# Patient Record
Sex: MALE | Race: BLACK OR AFRICAN AMERICAN | Employment: OTHER | URBAN - METROPOLITAN AREA
[De-identification: names, ages, dates, MRNs, and addresses within clinical notes are randomized per-mention and may not be internally consistent; named-entity substitution may affect disease eponyms.]

---

## 2017-02-28 ENCOUNTER — HOSPITAL ENCOUNTER (OUTPATIENT)
Dept: LAB | Age: 82
Discharge: HOME | End: 2017-02-28
Payer: MEDICARE

## 2017-02-28 DIAGNOSIS — N40.0 BENIGN ENLARGEMENT OF PROSTATE: ICD-10-CM

## 2017-02-28 DIAGNOSIS — E44.1 MALNUTRITION OF MILD DEGREE (HCC): ICD-10-CM

## 2017-02-28 DIAGNOSIS — I10 ESSENTIAL HYPERTENSION, MALIGNANT: ICD-10-CM

## 2017-02-28 DIAGNOSIS — M16.0 PRIMARY OSTEOARTHRITIS OF BOTH HIPS: ICD-10-CM

## 2017-02-28 DIAGNOSIS — E78.00 PURE HYPERCHOLESTEROLEMIA: ICD-10-CM

## 2017-02-28 DIAGNOSIS — H40.10X0 GLAUCOMA, OPEN ANGLE: ICD-10-CM

## 2017-02-28 LAB
ALBUMIN SERPL BCP-MCNC: 3.4 G/DL (ref 3.4–5)
ALBUMIN/GLOB SERPL: 0.9 {RATIO} (ref 0.8–1.7)
ALP SERPL-CCNC: 424 U/L (ref 45–117)
ALT SERPL-CCNC: 18 U/L (ref 16–61)
ANION GAP BLD CALC-SCNC: 7 MMOL/L (ref 3–18)
AST SERPL W P-5'-P-CCNC: 19 U/L (ref 15–37)
BASOPHILS # BLD AUTO: 0 K/UL (ref 0–0.06)
BASOPHILS # BLD: 1 % (ref 0–2)
BILIRUB SERPL-MCNC: 0.5 MG/DL (ref 0.2–1)
BUN SERPL-MCNC: 26 MG/DL (ref 7–18)
BUN/CREAT SERPL: 16 (ref 12–20)
CALCIUM SERPL-MCNC: 8.5 MG/DL (ref 8.5–10.1)
CHLORIDE SERPL-SCNC: 112 MMOL/L (ref 100–108)
CHOLEST SERPL-MCNC: 106 MG/DL
CO2 SERPL-SCNC: 23 MMOL/L (ref 21–32)
CREAT SERPL-MCNC: 1.64 MG/DL (ref 0.6–1.3)
DIFFERENTIAL METHOD BLD: ABNORMAL
EOSINOPHIL # BLD: 0.4 K/UL (ref 0–0.4)
EOSINOPHIL NFR BLD: 6 % (ref 0–5)
ERYTHROCYTE [DISTWIDTH] IN BLOOD BY AUTOMATED COUNT: 14.7 % (ref 11.6–14.5)
GLOBULIN SER CALC-MCNC: 3.8 G/DL (ref 2–4)
GLUCOSE SERPL-MCNC: 85 MG/DL (ref 74–99)
HCT VFR BLD AUTO: 35.2 % (ref 36–48)
HDLC SERPL-MCNC: 29 MG/DL (ref 40–60)
HDLC SERPL: 3.7 {RATIO} (ref 0–5)
HGB BLD-MCNC: 11.1 G/DL (ref 13–16)
LDLC SERPL CALC-MCNC: 53.2 MG/DL (ref 0–100)
LIPID PROFILE,FLP: ABNORMAL
LYMPHOCYTES # BLD AUTO: 39 % (ref 21–52)
LYMPHOCYTES # BLD: 2.2 K/UL (ref 0.9–3.6)
MCH RBC QN AUTO: 27.1 PG (ref 24–34)
MCHC RBC AUTO-ENTMCNC: 31.5 G/DL (ref 31–37)
MCV RBC AUTO: 85.9 FL (ref 74–97)
MONOCYTES # BLD: 0.6 K/UL (ref 0.05–1.2)
MONOCYTES NFR BLD AUTO: 11 % (ref 3–10)
NEUTS SEG # BLD: 2.4 K/UL (ref 1.8–8)
NEUTS SEG NFR BLD AUTO: 43 % (ref 40–73)
PLATELET # BLD AUTO: 150 K/UL (ref 135–420)
PMV BLD AUTO: 10.9 FL (ref 9.2–11.8)
POTASSIUM SERPL-SCNC: 4.1 MMOL/L (ref 3.5–5.5)
PROT SERPL-MCNC: 7.2 G/DL (ref 6.4–8.2)
RBC # BLD AUTO: 4.1 M/UL (ref 4.7–5.5)
SODIUM SERPL-SCNC: 142 MMOL/L (ref 136–145)
T4 SERPL-MCNC: 6.2 UG/DL (ref 4.5–10.9)
TRIGL SERPL-MCNC: 119 MG/DL (ref ?–150)
TSH SERPL DL<=0.05 MIU/L-ACNC: 1.17 UIU/ML (ref 0.36–3.74)
VLDLC SERPL CALC-MCNC: 23.8 MG/DL
WBC # BLD AUTO: 5.6 K/UL (ref 4.6–13.2)

## 2017-02-28 PROCEDURE — 80053 COMPREHEN METABOLIC PANEL: CPT | Performed by: INTERNAL MEDICINE

## 2017-02-28 PROCEDURE — 84436 ASSAY OF TOTAL THYROXINE: CPT | Performed by: INTERNAL MEDICINE

## 2017-02-28 PROCEDURE — 85025 COMPLETE CBC W/AUTO DIFF WBC: CPT | Performed by: INTERNAL MEDICINE

## 2017-02-28 PROCEDURE — 36415 COLL VENOUS BLD VENIPUNCTURE: CPT | Performed by: INTERNAL MEDICINE

## 2017-02-28 PROCEDURE — 80061 LIPID PANEL: CPT | Performed by: INTERNAL MEDICINE

## 2017-02-28 PROCEDURE — 84443 ASSAY THYROID STIM HORMONE: CPT | Performed by: INTERNAL MEDICINE

## 2017-03-01 ENCOUNTER — OFFICE VISIT (OUTPATIENT)
Dept: ORTHOPEDIC SURGERY | Age: 82
End: 2017-03-01

## 2017-03-01 VITALS
SYSTOLIC BLOOD PRESSURE: 144 MMHG | RESPIRATION RATE: 18 BRPM | HEART RATE: 115 BPM | BODY MASS INDEX: 18.55 KG/M2 | HEIGHT: 73 IN | OXYGEN SATURATION: 98 % | WEIGHT: 140 LBS | TEMPERATURE: 97.2 F | DIASTOLIC BLOOD PRESSURE: 123 MMHG

## 2017-03-01 DIAGNOSIS — G89.29 CHRONIC LOW BACK PAIN WITHOUT SCIATICA, UNSPECIFIED BACK PAIN LATERALITY: Chronic | ICD-10-CM

## 2017-03-01 DIAGNOSIS — M54.50 CHRONIC LOW BACK PAIN WITHOUT SCIATICA, UNSPECIFIED BACK PAIN LATERALITY: Chronic | ICD-10-CM

## 2017-03-01 DIAGNOSIS — M88.9 PAGET DISEASE OF BONE: ICD-10-CM

## 2017-03-01 RX ORDER — FENTANYL 75 UG/H
1 PATCH TRANSDERMAL
Qty: 10 PATCH | Refills: 0 | Status: SHIPPED | OUTPATIENT
Start: 2017-05-17 | End: 2017-06-01 | Stop reason: SDUPTHER

## 2017-03-01 RX ORDER — FENTANYL 75 UG/H
1 PATCH TRANSDERMAL
Qty: 10 PATCH | Refills: 0 | Status: SHIPPED | OUTPATIENT
Start: 2017-04-17 | End: 2017-06-01 | Stop reason: SDUPTHER

## 2017-03-01 RX ORDER — HYDROCODONE BITARTRATE AND ACETAMINOPHEN 10; 325 MG/1; MG/1
1 TABLET ORAL
Qty: 60 TAB | Refills: 0 | Status: SHIPPED | OUTPATIENT
Start: 2017-05-17 | End: 2017-06-01 | Stop reason: SDUPTHER

## 2017-03-01 RX ORDER — HYDROCODONE BITARTRATE AND ACETAMINOPHEN 10; 325 MG/1; MG/1
1 TABLET ORAL 2 TIMES DAILY
Qty: 60 TAB | Refills: 0 | Status: SHIPPED | OUTPATIENT
Start: 2017-04-17 | End: 2017-06-01 | Stop reason: SDUPTHER

## 2017-03-01 RX ORDER — FENTANYL 75 UG/H
1 PATCH TRANSDERMAL
Qty: 10 PATCH | Refills: 0 | Status: SHIPPED | OUTPATIENT
Start: 2017-03-18 | End: 2017-06-01 | Stop reason: SDUPTHER

## 2017-03-01 RX ORDER — HYDROCODONE BITARTRATE AND ACETAMINOPHEN 10; 325 MG/1; MG/1
1 TABLET ORAL 2 TIMES DAILY
Qty: 60 TAB | Refills: 0 | Status: SHIPPED | OUTPATIENT
Start: 2017-03-18 | End: 2017-06-01 | Stop reason: SDUPTHER

## 2017-03-01 NOTE — PROGRESS NOTES
[address]  [address], [address]  [address]  Phone: [phone]  Fax: [phone]        Lukasz Batista  : 3/14/1927  PCP: Salvador Barros MD    PROGRESS NOTE      ASSESSMENT AND PLAN    Nishant Madrid was seen today for back pain. Diagnoses and all orders for this visit:    Chronic low back pain without sciatica, unspecified back pain laterality  -     fentaNYL (DURAGESIC) 75 mcg/hr; 1 Patch by TransDERmal route every seventy-two (72) hours. Max Daily Amount: 1 Patch.  -     HYDROcodone-acetaminophen (NORCO)  mg tablet; Take 1 Tab by mouth two (2) times daily as needed for Pain. Max Daily Amount: 2 Tabs. -     HYDROcodone-acetaminophen (NORCO)  mg tablet; Take 1 Tab by mouth two (2) times a day. Max Daily Amount: 2 Tabs. -     fentaNYL (DURAGESIC) 75 mcg/hr; 1 Patch by TransDERmal route every seventy-two (72) hours. Max Daily Amount: 1 Patch.  -     HYDROcodone-acetaminophen (NORCO)  mg tablet; Take 1 Tab by mouth two (2) times a day. Max Daily Amount: 2 Tabs. -     fentaNYL (DURAGESIC) 75 mcg/hr; 1 Patch by TransDERmal route every seventy-two (72) hours. Max Daily Amount: 1 Patch. Chronic low back pain without sciatica, unspecified back pain laterality, mild left DF weakness  -     fentaNYL (DURAGESIC) 75 mcg/hr; 1 Patch by TransDERmal route every seventy-two (72) hours. Max Daily Amount: 1 Patch.  -     HYDROcodone-acetaminophen (NORCO)  mg tablet; Take 1 Tab by mouth two (2) times daily as needed for Pain. Max Daily Amount: 2 Tabs. -     HYDROcodone-acetaminophen (NORCO)  mg tablet; Take 1 Tab by mouth two (2) times a day. Max Daily Amount: 2 Tabs. -     fentaNYL (DURAGESIC) 75 mcg/hr; 1 Patch by TransDERmal route every seventy-two (72) hours. Max Daily Amount: 1 Patch.  -     HYDROcodone-acetaminophen (NORCO)  mg tablet; Take 1 Tab by mouth two (2) times a day. Max Daily Amount: 2 Tabs.   -     fentaNYL (DURAGESIC) 75 mcg/hr; 1 Patch by TransDERmal route every seventy-two (72) hours. Max Daily Amount: 1 Patch. Paget disease of bone  -     fentaNYL (DURAGESIC) 75 mcg/hr; 1 Patch by TransDERmal route every seventy-two (72) hours. Max Daily Amount: 1 Patch. -     fentaNYL (DURAGESIC) 75 mcg/hr; 1 Patch by TransDERmal route every seventy-two (72) hours. Max Daily Amount: 1 Patch. 1. Continue Duragesic and Norco.   2. May use heating pad during flares of pain. 3. Given information on long-acting opiates. Follow-up Disposition:  Return in about 3 months (around 6/1/2017) for Controlled substance med f/u. Risks and benefits of ongoing opiate therapy have been reviewed with the patient.  is appropriate. UDS results have been consistent. No pain behaviors. Pt has a good risk to benefit ratio which allows the pt to function in a home environment without side effects. HISTORY OF PRESENT ILLNESS  Stew Sanchez is a 80 y.o. male. Pt presents to the office for a f/u visit for back pain. UDS from last visit was consistent with medication use. He notes that his back pain has been increased for the past couple of days. He denies any difficulty urinating or any frequency. Pt denies any recent falls. No sciatic pain in the office today. Denies any weakness in his BLE. No saddle paresthesia. Pt uses Duragesic patches 75 mcg every 72 hours. Pt takes Norco  mg BID for BTP. He wishes to increase his medications due to his increased pain today. Pt denies any dizziness, confusion, uncontrolled constipation, and cravings due to controlled substances. Does not wish to have injections in the office today. Denies persistent fevers, chills, weight changes, neurogenic bowel or bladder symptoms. Pt denies recent ED visits or hospitalizations. Given age and potency of medications, advised pt NOT to take greater than prescribed.       Pain Assessment  3/1/2017   Location of Pain Back   Severity of Pain 8 Quality of Pain Sharp;Dull;Aching   Duration of Pain Persistent   Frequency of Pain Constant   Aggravating Factors Bending;Stretching;Exercise;Squatting;Standing;Walking;Stairs   Limiting Behavior Yes   Relieving Factors Rest   Relieving Factors Comment -   Result of Injury No       PAST MEDICAL HISTORY   Past Medical History:   Diagnosis Date    Arthritis     Back pain, chronic        Past Surgical History:   Procedure Laterality Date    HX HEENT      relieved pressure R eye 1/2014   . MEDICATIONS    Current Outpatient Prescriptions   Medication Sig Dispense Refill    fentaNYL (DURAGESIC) 75 mcg/hr 1 Patch by TransDERmal route every seventy-two (72) hours. Max Daily Amount: 1 Patch. DO NOT FILL BEFORE 01/05/2017 10 Patch 0    HYDROcodone-acetaminophen (NORCO)  mg tablet Take 1 Tab by mouth two (2) times daily as needed for Pain. Max Daily Amount: 2 Tabs. DO NOT FILL BEFORE 01/05/2017 60 Tab 0    HYDROcodone-acetaminophen (NORCO)  mg tablet Take 1 Tab by mouth two (2) times a day. Max Daily Amount: 2 Tabs. DO NOT FILL BEFORE 02/04/2017 60 Tab 0    fentaNYL (DURAGESIC) 75 mcg/hr 1 Patch by TransDERmal route every seventy-two (72) hours. Max Daily Amount: 1 Patch. DO NOT FILL BEFORE 02/04/2017 10 Patch 0    gabapentin (NEURONTIN) 300 mg capsule       HYDROcodone-acetaminophen (NORCO)  mg tablet Take 1 Tab by mouth two (2) times a day. Max Daily Amount: 2 Tabs. 60 Tab 0    fentaNYL (DURAGESIC) 75 mcg/hr 1 Patch by TransDERmal route every seventy-two (72) hours. Max Daily Amount: 1 Patch. 10 Patch 0    RESTASIS 0.05 % ophthalmic emulsion       fentaNYL (DURAGESIC) 75 mcg/hr 1 Patch by TransDERmal route every seventy-two (72) hours. Max Daily Amount: 1 Patch.  10 Patch 0    dorzolamide-timolol (COSOPT) 22.3-6.8 mg/mL ophthalmic solution       dorzolamide (TRUSOPT) 2 % ophthalmic solution       erythromycin (ILOTYCIN) ophthalmic ointment       timolol (TIMOPTIC) 0.5 % ophthalmic solution       fentaNYL (DURAGESIC) 75 mcg/hr 1 Patch by TransDERmal route every seventy-two (72) hours. Max Daily Amount: 1 Patch. 10 Patch 0    megestrol (MEGACE) 400 mg/10 mL (40 mg/mL) suspension       LUMIGAN 0.01 % ophthalmic drops Administer 1 Drop to both eyes nightly.  triamcinolone acetonide (KENALOG) 0.1 % topical cream       brimonidine-timolol (COMBIGAN) 0.2-0.5 % drop ophthalmic solution Administer 1 drop to both eyes every twelve (12) hours. ALLERGIES  No Known Allergies       SOCIAL HISTORY    Social History     Social History    Marital status:      Spouse name: N/A    Number of children: N/A    Years of education: N/A     Occupational History    Not on file. Social History Main Topics    Smoking status: Never Smoker    Smokeless tobacco: Never Used    Alcohol use No    Drug use: No    Sexual activity: Not on file     Other Topics Concern    Not on file     Social History Narrative       FAMILY HISTORY  Family History   Problem Relation Age of Onset    Diabetes Father     No Known Problems Mother        REVIEW OF SYSTEMS  Review of Systems   Constitutional: Negative for chills, fever and weight loss. Respiratory: Negative for shortness of breath. Cardiovascular: Negative for chest pain. Gastrointestinal: Negative for constipation. Negative for fecal incontinence   Genitourinary: Negative for dysuria. Negative for urinary incontinence   Musculoskeletal:        Per HPI   Skin: Negative for rash. Neurological: Negative for dizziness, tingling, tremors, focal weakness and headaches. Endo/Heme/Allergies: Does not bruise/bleed easily. Psychiatric/Behavioral: The patient does not have insomnia.         PHYSICAL EXAMINATION  Visit Vitals    BP (!) 144/123    Pulse (!) 115    Temp 97.2 °F (36.2 °C) (Oral)    Resp 18    Ht 6' 1\" (1.854 m)    Wt 140 lb (63.5 kg)    SpO2 98%    BMI 18.47 kg/m2         Accompanied by family member. Constitutional:  Well developed, well nourished, in no acute distress. Psychiatric: Affect and mood are appropriate. Integumentary: No rashes or abrasions noted on exposed areas. Cardiovascular/Peripheral Vascular: Intact l pulses. No peripheral edema is noted. Lymphatic:  No evidence of lymphedema. No cervical lymphadenopathy. SPINE/MUSCULOSKELETAL EXAM       Lumbar spine:  No rash, ecchymosis, or gross obliquity. No fasciculations. No focal atrophy is noted. No CVA tenderness. No tenderness to palpation at the sciatic notch. SI joints non-tender. Trochanters non tender. Sensation grossly intact to light touch. MOTOR:       Hip Flex  Quads Hamstrings Ankle DF EHL Ankle PF   Right +4/5 +4/5 +4/5 +4/5 +4/5 +4/5   Left +4/5 +4/5 +4/5 +4/5 +4/5 +4/5       Straight Leg raise negative. Ambulation with single point cane. FWB. Written by Betty Addison, as dictated by Myra Shaffer MD.    Mr. Evgeny Land may have a reminder for a \"due or due soon\" health maintenance. I have asked that he contact his primary care provider for follow-up on this health maintenance.

## 2017-03-01 NOTE — PATIENT INSTRUCTIONS
Learning About Safe Use of Long-Acting Opiates  Introduction  Long-acting opiates relieve moderate to severe long-term pain. They are also called extended-release opiates. Opiates relieve pain by changing the way your body feels pain. They don't cure a health problem. They help you manage the pain. If you take a lot of short-acting pain medicine, your doctor may give you long-acting opiates. They help you avoid the ups and downs in pain relief that you may have with short-acting medicine. Opiates are powerful. When taken on schedule and as your doctor prescribes, they work well and are safe. But even with proper use, opiates can cause tolerance and overdose, physical dependence, addiction, or death. Examples  · Fentanyl patch (Duragesic)  · Methadone (Dolophine)  · Morphine (Trina)  · Oxycodone controlled-release (OxyContin)  Safety tips  To avoid taking too much (overdose): · Take your medicines exactly as prescribed. Do not take extra doses. Even one extra dose can be dangerous. Taking too much of these medicines can cause death. · Call your doctor if you miss a dose of your medicine and aren't sure what to do. Do not double your dose. · Do not break, crush, or chew a pill. Do not cut or tear a patch. To use these medicines safely:  · Call your doctor if you think you are having a problem with your medicine. You will get more details on the specific medicines your doctor prescribes. · Do not drink alcohol or take illegal drugs. · Do not drive or operate machinery until you can think clearly. Opiates may affect your judgment and decision making. Talk with your doctor about when it is safe to drive. · Keep your medicine in a safe and secure place. Keep it away from children and pets. · Check with your doctor or pharmacist before you use any other medicines. This includes over-the-counter medicines.   ¨ Make sure your doctor knows all of the medicines, vitamins, herbal products, and supplements you take.  ¨ Do not take opiates with other medicines that make you sleepy or relaxed. Taking both can be dangerous. · Talk to your doctor about a naloxone rescue kit. A kit can help you, and even save your life, if you take too much of an opiate. Possible side effects  All medicines have side effects. But many people don't feel the side effects, or they are able to deal with them. You may:  · Feel confused or have a hard time thinking clearly. · Be constipated. · Feel faint, dizzy, or lightheaded. · Feel drowsy. · Feel sick to your stomach or vomit. · Have an allergic reaction. What to know about taking this medicine  · When you take an opiate regularly, your body gets used to it. This can lead to tolerance and physical dependence. These are not the same as addiction. ¨ Tolerance means that, over time, you may need to take more of the drug to keep getting the same amount of pain relief. The danger is that tolerance greatly increases your risk of overdose, breathing emergencies, and death. ¨ If you are physically dependent on an opiate, you may have withdrawal symptoms when you stop taking it. These include nausea, sweating, chills, diarrhea, and shaking. You can avoid these symptoms if you gradually stop taking the opiate over a set period of time. Your doctor can help you. ¨ Addiction is a chronic illness that makes you crave a substance, such as a drug or alcohol. When you are addicted to a substance, you have a hard time stopping yourself from using it even when you can see it causes harm. · You have a small risk of addiction when you take opiates. Your risk is greater if you have a history of substance use problems. Others who are more at risk for addiction are teenagers, older adults, people who have depression, and those who take high doses of medicine. · Ask for written instructions from your doctor or pharmacist about how to safely get rid of any medicine that's left over.   · Call your doctor if the dose you are taking doesn't control your pain. Where can you learn more? Go to http://rom-jose daniel.info/. Enter O105 in the search box to learn more about \"Learning About Safe Use of Long-Acting Opiates. \"  Current as of: May 20, 2016  Content Version: 11.1  © 9148-7474 [employer]. Care instructions adapted under license by [employer] (which disclaims liability or warranty for this information). If you have questions about a medical condition or this instruction, always ask your healthcare professional. [address] any warranty or liability for your use of this information.

## 2017-03-01 NOTE — MR AVS SNAPSHOT
Visit Information Date & Time Provider Department Dept. Phone Encounter #  
 3/1/2017 10:45  Alfredo Velazquez MD [facility] [phone] [id number] Follow-up Instructions Return in about 3 months (around 6/1/2017) for Controlled substance med f/u. Your Appointments 6/1/2017  8:45 AM  
Follow Up with 127 Alfredo Velazquez MD  
[facility] ([facility]) Appt Note: 3 month med fu  no copay [address] [facility] [id number] [phone]  
  
   
 [address] [facility] [address] Upcoming Health Maintenance Date Due DTaP/Tdap/Td series (1 - Tdap) [birth year]14/1948 ZOSTER VACCINE AGE 60> 3/14/1987 GLAUCOMA SCREENING Q2Y 3/14/1992 Pneumococcal 65+ High/Highest Risk (1 of 2 - PCV13) 3/14/1992 MEDICARE YEARLY EXAM 3/14/1992 INFLUENZA AGE 9 TO ADULT 8/1/2016 Allergies as of 3/1/2017  Review Complete On: 3/1/2017 By: Chris Kauffman LPN No Known Allergies Current Immunizations  Never Reviewed No immunizations on file. Not reviewed this visit You Were Diagnosed With   
  
 Codes Comments Chronic low back pain without sciatica, unspecified back pain laterality     ICD-10-CM: M54.5, G89.29 ICD-9-CM: 724.2, 338.29 Chronic low back pain without sciatica, unspecified back pain laterality     ICD-10-CM: M54.5, G89.29 ICD-9-CM: 724.2, 338.29 Paget disease of bone     ICD-10-CM: M88.9 ICD-9-CM: 731.0 Vitals BP  
  
  
  
  
  
 (!) 144/123 BMI and BSA Data Body Mass Index Body Surface Area  
 18.47 kg/m 2 1.81 m 2 Preferred Pharmacy Pharmacy Name Phone [facility] - [place], [address] [phone] Your Updated Medication List  
  
   
This list is accurate as of: 3/1/17 11:34 AM.  Always use your most recent med list.  
  
  
  
  
 COMBIGAN 0.2-0.5 % Drop ophthalmic solution Generic drug:  brimonidine-timolol Administer 1 drop to both eyes every twelve (12) hours. dorzolamide 2 % ophthalmic solution Commonly known as:  TRUSOPT  
  
 dorzolamide-timolol 22.3-6.8 mg/mL ophthalmic solution Commonly known as:  COSOPT  
  
 erythromycin ophthalmic ointment Commonly known as:  ILOTYCIN  
  
 * fentaNYL 75 mcg/hr Commonly known as:  DURAGESIC  
1 Patch by TransDERmal route every seventy-two (72) hours. Max Daily Amount: 1 Patch. * fentaNYL 75 mcg/hr Commonly known as:  DURAGESIC  
1 Patch by TransDERmal route every seventy-two (72) hours. Max Daily Amount: 1 Patch. * fentaNYL 75 mcg/hr Commonly known as:  DURAGESIC  
1 Patch by TransDERmal route every seventy-two (72) hours. Max Daily Amount: 1 Patch. Start taking on:  3/18/2017 * fentaNYL 75 mcg/hr Commonly known as:  DURAGESIC  
1 Patch by TransDERmal route every seventy-two (72) hours. Max Daily Amount: 1 Patch. Start taking on:  4/17/2017 * fentaNYL 75 mcg/hr Commonly known as:  DURAGESIC  
1 Patch by TransDERmal route every seventy-two (72) hours. Max Daily Amount: 1 Patch. Start taking on:  5/17/2017  
  
 gabapentin 300 mg capsule Commonly known as:  NEURONTIN  
  
 * HYDROcodone-acetaminophen  mg tablet Commonly known as:  Bonita Petri Take 1 Tab by mouth two (2) times a day. Max Daily Amount: 2 Tabs. Start taking on:  3/18/2017  
  
 * HYDROcodone-acetaminophen  mg tablet Commonly known as:  Bonita Petri Take 1 Tab by mouth two (2) times a day. Max Daily Amount: 2 Tabs. Start taking on:  4/17/2017  
  
 * HYDROcodone-acetaminophen  mg tablet Commonly known as:  Bonita Petri Take 1 Tab by mouth two (2) times daily as needed for Pain. Max Daily Amount: 2 Tabs. Start taking on:  5/17/2017 LUMIGAN 0.01 % ophthalmic drops Generic drug:  bimatoprost  
Administer 1 Drop to both eyes nightly. megestrol 400 mg/10 mL (40 mg/mL) suspension Commonly known as:  MEGACE  
  
 RESTASIS 0.05 % ophthalmic emulsion Generic drug:  cycloSPORINE  
  
 timolol 0.5 % ophthalmic solution Commonly known as:  TIMOPTIC  
  
 triamcinolone acetonide 0.1 % topical cream  
Commonly known as:  KENALOG * Notice: This list has 8 medication(s) that are the same as other medications prescribed for you. Read the directions carefully, and ask your doctor or other care provider to review them with you. Prescriptions Printed Refills  
 fentaNYL (DURAGESIC) 75 mcg/hr 0 Starting on: 2017 Si Patch by TransDERmal route every seventy-two (72) hours. Max Daily Amount: 1 Patch. Class: Print Route: TransDERmal  
 HYDROcodone-acetaminophen (NORCO)  mg tablet 0 Starting on: 2017 Sig: Take 1 Tab by mouth two (2) times daily as needed for Pain. Max Daily Amount: 2 Tabs. Class: Print Route: Oral  
 HYDROcodone-acetaminophen (NORCO)  mg tablet 0 Starting on: 2017 Sig: Take 1 Tab by mouth two (2) times a day. Max Daily Amount: 2 Tabs. Class: Print Route: Oral  
 fentaNYL (DURAGESIC) 75 mcg/hr 0 Starting on: 2017 Si Patch by TransDERmal route every seventy-two (72) hours. Max Daily Amount: 1 Patch. Class: Print Route: TransDERmal  
 HYDROcodone-acetaminophen (NORCO)  mg tablet 0 Starting on: 3/18/2017 Sig: Take 1 Tab by mouth two (2) times a day. Max Daily Amount: 2 Tabs. Class: Print Route: Oral  
 fentaNYL (DURAGESIC) 75 mcg/hr 0 Starting on: 3/18/2017 Si Patch by TransDERmal route every seventy-two (72) hours. Max Daily Amount: 1 Patch. Class: Print Route: TransDERmal  
  
Follow-up Instructions Return in about 3 months (around 2017) for Controlled substance med f/u. Patient Instructions Learning About Safe Use of Long-Acting Opiates Introduction Long-acting opiates relieve moderate to severe long-term pain. They are also called extended-release opiates. Opiates relieve pain by changing the way your body feels pain. They don't cure a health problem. They help you manage the pain. If you take a lot of short-acting pain medicine, your doctor may give you long-acting opiates. They help you avoid the ups and downs in pain relief that you may have with short-acting medicine. Opiates are powerful. When taken on schedule and as your doctor prescribes, they work well and are safe. But even with proper use, opiates can cause tolerance and overdose, physical dependence, addiction, or death. Examples · Fentanyl patch (Duragesic) · Methadone (Dolophine) · Morphine (Trina) · Oxycodone controlled-release (OxyContin) Safety tips To avoid taking too much (overdose): · Take your medicines exactly as prescribed. Do not take extra doses. Even one extra dose can be dangerous. Taking too much of these medicines can cause death. · Call your doctor if you miss a dose of your medicine and aren't sure what to do. Do not double your dose. · Do not break, crush, or chew a pill. Do not cut or tear a patch. To use these medicines safely: · Call your doctor if you think you are having a problem with your medicine. You will get more details on the specific medicines your doctor prescribes. · Do not drink alcohol or take illegal drugs. · Do not drive or operate machinery until you can think clearly. Opiates may affect your judgment and decision making. Talk with your doctor about when it is safe to drive. · Keep your medicine in a safe and secure place. Keep it away from children and pets. · Check with your doctor or pharmacist before you use any other medicines. This includes over-the-counter medicines. ¨ Make sure your doctor knows all of the medicines, vitamins, herbal products, and supplements you take. ¨ Do not take opiates with other medicines that make you sleepy or relaxed. Taking both can be dangerous. · Talk to your doctor about a naloxone rescue kit. A kit can help you, and even save your life, if you take too much of an opiate. Possible side effects All medicines have side effects. But many people don't feel the side effects, or they are able to deal with them. You may: · Feel confused or have a hard time thinking clearly. · Be constipated. · Feel faint, dizzy, or lightheaded. · Feel drowsy. · Feel sick to your stomach or vomit. · Have an allergic reaction. What to know about taking this medicine · When you take an opiate regularly, your body gets used to it. This can lead to tolerance and physical dependence. These are not the same as addiction. ¨ Tolerance means that, over time, you may need to take more of the drug to keep getting the same amount of pain relief. The danger is that tolerance greatly increases your risk of overdose, breathing emergencies, and death. ¨ If you are physically dependent on an opiate, you may have withdrawal symptoms when you stop taking it. These include nausea, sweating, chills, diarrhea, and shaking. You can avoid these symptoms if you gradually stop taking the opiate over a set period of time. Your doctor can help you. ¨ Addiction is a chronic illness that makes you crave a substance, such as a drug or alcohol. When you are addicted to a substance, you have a hard time stopping yourself from using it even when you can see it causes harm. · You have a small risk of addiction when you take opiates. Your risk is greater if you have a history of substance use problems. Others who are more at risk for addiction are teenagers, older adults, people who have depression, and those who take high doses of medicine. · Ask for written instructions from your doctor or pharmacist about how to safely get rid of any medicine that's left over. · Call your doctor if the dose you are taking doesn't control your pain. Where can you learn more? Go to http://rom-jose daniel.info/. Enter O105 in the search box to learn more about \"Learning About Safe Use of Long-Acting Opiates. \" Current as of: May 20, 2016 Content Version: 11.1 © 0372-8018 [employer]. Care instructions adapted under license by [employer] (which disclaims liability or warranty for this information). If you have questions about a medical condition or this instruction, always ask your healthcare professional. [address] any warranty or liability for your use of this information. Please provide this summary of care documentation to your next provider. Your primary care clinician is listed as Chapincito Newman. If you have any questions after today's visit, please call [phone].

## 2017-06-01 ENCOUNTER — OFFICE VISIT (OUTPATIENT)
Dept: ORTHOPEDIC SURGERY | Age: 82
End: 2017-06-01

## 2017-06-01 VITALS
BODY MASS INDEX: 18.82 KG/M2 | HEIGHT: 73 IN | HEART RATE: 88 BPM | WEIGHT: 142 LBS | SYSTOLIC BLOOD PRESSURE: 117 MMHG | DIASTOLIC BLOOD PRESSURE: 68 MMHG

## 2017-06-01 DIAGNOSIS — M54.50 CHRONIC BILATERAL LOW BACK PAIN WITHOUT SCIATICA: Primary | Chronic | ICD-10-CM

## 2017-06-01 DIAGNOSIS — G89.29 CHRONIC BILATERAL LOW BACK PAIN WITHOUT SCIATICA: Primary | Chronic | ICD-10-CM

## 2017-06-01 DIAGNOSIS — M88.9 PAGET DISEASE OF BONE: Chronic | ICD-10-CM

## 2017-06-01 DIAGNOSIS — M54.50 CHRONIC BILATERAL LOW BACK PAIN WITHOUT SCIATICA: Chronic | ICD-10-CM

## 2017-06-01 DIAGNOSIS — G89.29 CHRONIC BILATERAL LOW BACK PAIN WITHOUT SCIATICA: Chronic | ICD-10-CM

## 2017-06-01 RX ORDER — HYDROCODONE BITARTRATE AND ACETAMINOPHEN 10; 325 MG/1; MG/1
1 TABLET ORAL 2 TIMES DAILY
Qty: 45 TAB | Refills: 0 | Status: SHIPPED | OUTPATIENT
Start: 2017-06-18 | End: 2017-07-20 | Stop reason: SDUPTHER

## 2017-06-01 RX ORDER — FENTANYL 75 UG/H
1 PATCH TRANSDERMAL
Qty: 10 PATCH | Refills: 0 | Status: SHIPPED | OUTPATIENT
Start: 2017-06-18 | End: 2017-07-20 | Stop reason: SDUPTHER

## 2017-06-01 NOTE — PATIENT INSTRUCTIONS
Learning About Safe Use of Long-Acting Opioids  Introduction  Long-acting opioids relieve moderate to severe long-term pain. They are also called extended-release opioids. Opioids relieve pain by changing the way your body feels pain. They don't cure a health problem. They help you manage the pain. If you take a lot of short-acting pain medicine, your doctor may give you long-acting opioids. They help you avoid the ups and downs in pain relief that you may have with short-acting medicine. Opioids are powerful. When taken on schedule and as your doctor prescribes, they work well and are safe. But even with proper use, opioids can cause tolerance and overdose, physical dependence, addiction, or death. Examples  · Fentanyl patch (Duragesic)  · Methadone (Dolophine)  · Morphine (Trina)  · Oxycodone controlled-release (OxyContin)  Safety tips  To avoid taking too much (overdose): · Be safe with medicines. Take your medicines exactly as prescribed. Do not take extra doses. Even one extra dose can be dangerous. Taking too much of these medicines can cause death. · Call your doctor if you miss a dose of your medicine and aren't sure what to do. Do not double your dose. · Do not break, crush, or chew a pill. Do not cut or tear a patch. To use these medicines safely:  · Call your doctor if you think you are having a problem with your medicine. You will get more details on the specific medicines your doctor prescribes. · Do not drink alcohol or take illegal drugs. · Do not drive or operate machinery until you can think clearly. Opioids may affect your judgment and decision making. Talk with your doctor about when it is safe to drive. · Keep your medicine in a safe and secure place. Keep it away from children and pets. · Check with your doctor or pharmacist before you use any other medicines. This includes over-the-counter medicines.   ¨ Make sure your doctor knows all of the medicines, vitamins, herbal products, and supplements you take. ¨ Do not take opioids with other medicines that make you sleepy or relaxed. Taking both can be dangerous. · Talk to your doctor about a naloxone rescue kit. A kit can help you, and even save your life, if you take too much of an opioid. Possible side effects  All medicines have side effects. But many people don't feel the side effects, or they are able to deal with them. You may:  · Feel confused or have a hard time thinking clearly. · Be constipated. · Feel faint, dizzy, or lightheaded. · Feel drowsy. · Feel sick to your stomach or vomit. · Have an allergic reaction. What to know about taking this medicine  · When you take an opioid regularly, your body gets used to it. This can lead to tolerance and physical dependence. These are not the same as addiction. ¨ Tolerance means that, over time, you may need to take more of the drug to keep getting the same amount of pain relief. The danger is that tolerance greatly increases your risk of overdose, breathing emergencies, and death. ¨ If you are physically dependent on an opioid, you may have withdrawal symptoms when you stop taking it. These include nausea, sweating, chills, diarrhea, and shaking. You can avoid these symptoms if you gradually stop taking the opioid over a set period of time. Your doctor can help you. ¨ Addiction is a chronic illness that makes you crave a substance, such as a drug or alcohol. When you are addicted to a substance, you have a hard time stopping yourself from using it even when you can see it causes harm. · You have a small risk of addiction when you take opioids. Your risk is greater if you have a history of substance use problems. Others who are more at risk for addiction are teenagers, older adults, people who have depression, and those who take high doses of medicine.   · Ask for written instructions from your doctor or pharmacist about how to safely get rid of any medicine that's left over.  · Call your doctor if the dose you are taking doesn't control your pain. Where can you learn more? Go to http://rom-jose daniel.info/. Enter O105 in the search box to learn more about \"Learning About Safe Use of Long-Acting Opioids. \"  Current as of: November 15, 2016  Content Version: 11.2  © 9718-3286 [employer]. Care instructions adapted under license by [employer] (which disclaims liability or warranty for this information). If you have questions about a medical condition or this instruction, always ask your healthcare professional. [place] [address] any warranty or liability for your use of this information.

## 2017-06-01 NOTE — PROGRESS NOTES
[address], [address]  [place], [address] [place] [address] Street  Phone: [phone]  Fax: [phone]        Julianna Litten  : 3/14/1927  PCP: Niya Mederos MD    PROGRESS NOTE      ASSESSMENT AND PLAN    Pravin Luna was seen today for follow-up. Diagnoses and all orders for this visit:    Chronic bilateral low back pain without sciatica  -     DRUG SCREEN UR - W/ CONFIRM; Future  -     HYDROcodone-acetaminophen (NORCO)  mg tablet; Take 1 Tab by mouth two (2) times a day. Max Daily Amount: 2 Tabs. -     fentaNYL (DURAGESIC) 75 mcg/hr; 1 Patch by TransDERmal route every seventy-two (72) hours. Max Daily Amount: 1 Patch. Paget disease of bone  -     DRUG SCREEN UR - W/ CONFIRM; Future  -     HYDROcodone-acetaminophen (NORCO)  mg tablet; Take 1 Tab by mouth two (2) times a day. Max Daily Amount: 2 Tabs. -     fentaNYL (DURAGESIC) 75 mcg/hr; 1 Patch by TransDERmal route every seventy-two (72) hours. Max Daily Amount: 1 Patch. 1. Pain Agreement updated today. 2. Discussed risk of accidental overdose and Narcan with pt.    3. His Norco was reduced from #60 per month to #45 to reflect his usage. 4. Discussed tapering Duragesic patch, doing well with current dose,active for age, no side effects. 5. Given information on safe use of long-acting opioids. Follow-up Disposition:  Return in about 3 months (around 2017) for Controlled substance med f/u. Risks and benefits of ongoing opiate therapy have been reviewed with the patient.  is appropriate. UDS results have been consistent. No pain behaviors. Pt has a good risk to benefit ratio which allows the pt to function in a home environment without side effects. HISTORY OF PRESENT ILLNESS  Vanessa Valdez is a 80 y.o. male. Pt presents to the office for a f/u visit for back pain. Pt continues to have back pain. Pt has recently turned 80years old.  Pt has good and bad days with his pain. He denies any frequent falls. He denies any shooting pain in his BLE. No saddle paresthesia. He has benefit from his current medication regimen. He does not wish to change his medications at this time. Pt uses Duragesic patches 75 mcg every 72 hours, sometimes will leave on for longer. Pt uses Norco  mg zero-BID PRN for BTP. He does not always take Norco every day as he only takes this when he needs it. He takes Dulcolax for constipation. Pt denies any dizziness, confusion, and cravings due to controlled substances. Denies persistent fevers, chills, weight changes, neurogenic bowel or bladder symptoms. Pt denies recent ED visits or hospitalizations. Pt has MME of 200 which allows him to be functional without side effects. Risks discussed, opioid safety. Pain Assessment  6/1/2017   Location of Pain Back   Severity of Pain 7   Quality of Pain Aching   Duration of Pain Persistent   Frequency of Pain Constant   Aggravating Factors -   Limiting Behavior Some   Relieving Factors Nothing   Relieving Factors Comment -   Result of Injury No       PAST MEDICAL HISTORY   Past Medical History:   Diagnosis Date    Arthritis     Back pain, chronic        Past Surgical History:   Procedure Laterality Date    HX HEENT      relieved pressure R eye 1/2014   . MEDICATIONS    Current Outpatient Prescriptions   Medication Sig Dispense Refill    HYDROcodone-acetaminophen (NORCO)  mg tablet Take 1 Tab by mouth two (2) times a day. Max Daily Amount: 2 Tabs. 60 Tab 0    fentaNYL (DURAGESIC) 75 mcg/hr 1 Patch by TransDERmal route every seventy-two (72) hours. Max Daily Amount: 1 Patch.  10 Patch 0    RESTASIS 0.05 % ophthalmic emulsion       dorzolamide-timolol (COSOPT) 22.3-6.8 mg/mL ophthalmic solution       dorzolamide (TRUSOPT) 2 % ophthalmic solution       erythromycin (ILOTYCIN) ophthalmic ointment       timolol (TIMOPTIC) 0.5 % ophthalmic solution       LUMIGAN 0.01 % ophthalmic drops Administer 1 Drop to both eyes nightly.  brimonidine-timolol (COMBIGAN) 0.2-0.5 % drop ophthalmic solution Administer 1 drop to both eyes every twelve (12) hours.  fentaNYL (DURAGESIC) 75 mcg/hr 1 Patch by TransDERmal route every seventy-two (72) hours. Max Daily Amount: 1 Patch. 10 Patch 0    HYDROcodone-acetaminophen (NORCO)  mg tablet Take 1 Tab by mouth two (2) times daily as needed for Pain. Max Daily Amount: 2 Tabs. 60 Tab 0    HYDROcodone-acetaminophen (NORCO)  mg tablet Take 1 Tab by mouth two (2) times a day. Max Daily Amount: 2 Tabs. 60 Tab 0    fentaNYL (DURAGESIC) 75 mcg/hr 1 Patch by TransDERmal route every seventy-two (72) hours. Max Daily Amount: 1 Patch. 10 Patch 0    gabapentin (NEURONTIN) 300 mg capsule       fentaNYL (DURAGESIC) 75 mcg/hr 1 Patch by TransDERmal route every seventy-two (72) hours. Max Daily Amount: 1 Patch. 10 Patch 0    fentaNYL (DURAGESIC) 75 mcg/hr 1 Patch by TransDERmal route every seventy-two (72) hours. Max Daily Amount: 1 Patch. 10 Patch 0    megestrol (MEGACE) 400 mg/10 mL (40 mg/mL) suspension       triamcinolone acetonide (KENALOG) 0.1 % topical cream           ALLERGIES  No Known Allergies       SOCIAL HISTORY    Social History     Social History    Marital status:      Spouse name: N/A    Number of children: N/A    Years of education: N/A     Occupational History    Not on file. Social History Main Topics    Smoking status: Never Smoker    Smokeless tobacco: Never Used    Alcohol use No    Drug use: No    Sexual activity: Not on file     Other Topics Concern    Not on file     Social History Narrative       FAMILY HISTORY  Family History   Problem Relation Age of Onset    Diabetes Father     No Known Problems Mother        REVIEW OF SYSTEMS  Review of Systems   Constitutional: Negative for chills, fever and weight loss. Respiratory: Negative for shortness of breath. Cardiovascular: Negative for chest pain. Gastrointestinal: Negative for constipation. Negative for fecal incontinence   Genitourinary: Negative for dysuria. Negative for urinary incontinence   Musculoskeletal:        Per HPI   Skin: Negative for rash. Neurological: Negative for dizziness, tingling, tremors, focal weakness and headaches. Endo/Heme/Allergies: Does not bruise/bleed easily. Psychiatric/Behavioral: The patient does not have insomnia. PHYSICAL EXAMINATION  Visit Vitals    /68    Pulse 88    Ht 6' 1\" (1.854 m)    Wt 142 lb (64.4 kg)    BMI 18.73 kg/m2         Accompanied by family member. Constitutional:  , well nourished, in no acute distress. Facial bony hypertrophy  Psychiatric: Affect and mood are appropriate. Integumentary: No rashes or abrasions noted on exposed areas. Cardiovascular/Peripheral Vascular: Intact l pulses. No peripheral edema is noted. Lymphatic:  No evidence of lymphedema. No cervical lymphadenopathy. SPINE/MUSCULOSKELETAL EXAM    Lumbar spine:  No rash, ecchymosis, or gross obliquity. No fasciculations. No focal atrophy is noted. Increased muscle tone lumbar p/spinals. Tenderness to palpation of bilateral iliac crest. No tenderness to palpation at the sciatic notch. SI joints non-tender. Trochanters non tender. Sensation grossly intact to light touch. MOTOR:       Hip Flex  Quads Hamstrings Ankle DF EHL Ankle PF   Right +4/5 +4/5 +4/5 +4/5 +4/5 +4/5   Left +4/5 +4/5 +4/5 +4/5 +4/5 +4/5     Straight Leg raise negative. Ambulation with single point cane. FWB. Written by Sarah Garcia, as dictated by Ming Delgado MD.    I, Dr. Ming Delgado MD, confirm that all documentation is accurate. Mr. Bernice Ghotra may have a reminder for a \"due or due soon\" health maintenance. I have asked that he contact his primary care provider for follow-up on this health maintenance.

## 2017-06-01 NOTE — MR AVS SNAPSHOT
Visit Information Date & Time Provider Department Dept. Phone Encounter #  
 6/1/2017  8:45 AM Donna Fernandez MD [facility] [phone] Follow-up Instructions Return in about 3 months (around 9/1/2017) for Controlled substance med f/u. Upcoming Health Maintenance Date Due DTaP/Tdap/Td series (1 - Tdap) 3/14/1948 ZOSTER VACCINE AGE 60> 3/14/1987 GLAUCOMA SCREENING Q2Y 3/14/1992 Pneumococcal 65+ High/Highest Risk (1 of 2 - PCV13) 3/14/1992 MEDICARE YEARLY EXAM 3/14/1992 INFLUENZA AGE 9 TO ADULT 8/1/2017 Allergies as of 6/1/2017  Review Complete On: 6/1/2017 By: Donna Fernandez MD  
 No Known Allergies Current Immunizations  Never Reviewed No immunizations on file. Not reviewed this visit You Were Diagnosed With   
  
 Codes Comments Chronic bilateral low back pain without sciatica    -  Primary ICD-10-CM: M54.5, G89.29 ICD-9-CM: 724.2, 338.29 Paget disease of bone     ICD-10-CM: M88.9 ICD-9-CM: 731.0 Vitals BP Pulse Height(growth percentile) Weight(growth percentile) BMI Smoking Status 117/68 88 6' 1\" (1.854 m) 142 lb (64.4 kg) 18.73 kg/m2 Never Smoker Vitals History BMI and BSA Data Body Mass Index Body Surface Area 18.73 kg/m 2 1.82 m 2 Preferred Pharmacy Pharmacy Name Phone [facility]  [place] [address] [place] [address] [phone] Your Updated Medication List  
  
   
This list is accurate as of: 6/1/17  9:43 AM.  Always use your most recent med list.  
  
  
  
  
 COMBIGAN 0.2-0.5 % Drop ophthalmic solution Generic drug:  brimonidine-timolol Administer 1 drop to both eyes every twelve (12) hours. dorzolamide 2 % ophthalmic solution Commonly known as:  TRUSOPT  
  
 dorzolamide-timolol 22.3-6.8 mg/mL ophthalmic solution Commonly known as:  COSOPT  
  
 erythromycin ophthalmic ointment Commonly known as:  ILOTYCIN  
  
 fentaNYL 75 mcg/hr Commonly known as:  DURAGESIC  
1 Patch by TransDERmal route every seventy-two (72) hours. Max Daily Amount: 1 Patch. Start taking on:  2017 HYDROcodone-acetaminophen  mg tablet Commonly known as:  Fredick Sly Take 1 Tab by mouth two (2) times a day. Max Daily Amount: 2 Tabs. Start taking on:  2017 LUMIGAN 0.01 % ophthalmic drops Generic drug:  bimatoprost  
Administer 1 Drop to both eyes nightly. RESTASIS 0.05 % ophthalmic emulsion Generic drug:  cycloSPORINE  
  
 timolol 0.5 % ophthalmic solution Commonly known as:  TIMOPTIC Prescriptions Printed Refills HYDROcodone-acetaminophen (NORCO)  mg tablet 0 Starting on: 2017 Sig: Take 1 Tab by mouth two (2) times a day. Max Daily Amount: 2 Tabs. Class: Print Route: Oral  
 fentaNYL (DURAGESIC) 75 mcg/hr 0 Starting on: 2017 Si Patch by TransDERmal route every seventy-two (72) hours. Max Daily Amount: 1 Patch. Class: Print Route: TransDERmal  
  
Follow-up Instructions Return in about 3 months (around 2017) for Controlled substance med f/u. To-Do List   
 2017 Lab:  DRUG SCREEN UR - W/ CONFIRM Patient Instructions Learning About Safe Use of Long-Acting Opioids Introduction Long-acting opioids relieve moderate to severe long-term pain. They are also called extended-release opioids. Opioids relieve pain by changing the way your body feels pain. They don't cure a health problem. They help you manage the pain. If you take a lot of short-acting pain medicine, your doctor may give you long-acting opioids. They help you avoid the ups and downs in pain relief that you may have with short-acting medicine. Opioids are powerful. When taken on schedule and as your doctor prescribes, they work well and are safe.  But even with proper use, opioids can cause tolerance and overdose, physical dependence, addiction, or death. Examples · Fentanyl patch (Duragesic) · Methadone (Dolophine) · Morphine (Trina) · Oxycodone controlled-release (OxyContin) Safety tips To avoid taking too much (overdose): · Be safe with medicines. Take your medicines exactly as prescribed. Do not take extra doses. Even one extra dose can be dangerous. Taking too much of these medicines can cause death. · Call your doctor if you miss a dose of your medicine and aren't sure what to do. Do not double your dose. · Do not break, crush, or chew a pill. Do not cut or tear a patch. To use these medicines safely: · Call your doctor if you think you are having a problem with your medicine. You will get more details on the specific medicines your doctor prescribes. · Do not drink alcohol or take illegal drugs. · Do not drive or operate machinery until you can think clearly. Opioids may affect your judgment and decision making. Talk with your doctor about when it is safe to drive. · Keep your medicine in a safe and secure place. Keep it away from children and pets. · Check with your doctor or pharmacist before you use any other medicines. This includes over-the-counter medicines. ¨ Make sure your doctor knows all of the medicines, vitamins, herbal products, and supplements you take. ¨ Do not take opioids with other medicines that make you sleepy or relaxed. Taking both can be dangerous. · Talk to your doctor about a naloxone rescue kit. A kit can help you, and even save your life, if you take too much of an opioid. Possible side effects All medicines have side effects. But many people don't feel the side effects, or they are able to deal with them. You may: · Feel confused or have a hard time thinking clearly. · Be constipated. · Feel faint, dizzy, or lightheaded. · Feel drowsy. · Feel sick to your stomach or vomit. · Have an allergic reaction. What to know about taking this medicine · When you take an opioid regularly, your body gets used to it. This can lead to tolerance and physical dependence. These are not the same as addiction. ¨ Tolerance means that, over time, you may need to take more of the drug to keep getting the same amount of pain relief. The danger is that tolerance greatly increases your risk of overdose, breathing emergencies, and death. ¨ If you are physically dependent on an opioid, you may have withdrawal symptoms when you stop taking it. These include nausea, sweating, chills, diarrhea, and shaking. You can avoid these symptoms if you gradually stop taking the opioid over a set period of time. Your doctor can help you. ¨ Addiction is a chronic illness that makes you crave a substance, such as a drug or alcohol. When you are addicted to a substance, you have a hard time stopping yourself from using it even when you can see it causes harm. · You have a small risk of addiction when you take opioids. Your risk is greater if you have a history of substance use problems. Others who are more at risk for addiction are teenagers, older adults, people who have depression, and those who take high doses of medicine. · Ask for written instructions from your doctor or pharmacist about how to safely get rid of any medicine that's left over. · Call your doctor if the dose you are taking doesn't control your pain. Where can you learn more? Go to http://rom-jose daniel.info/. Enter O105 in the search box to learn more about \"Learning About Safe Use of Long-Acting Opioids. \" Current as of: November 15, 2016 Content Version: 11.2 © 3235-0006 [employer]. Care instructions adapted under license by [employer] (which disclaims liability or warranty for this information).  If you have questions about a medical condition or this instruction, always ask your healthcare professional. [employer] disclaims any warranty or liability for your use of this information. Please provide this summary of care documentation to your next provider. Your primary care clinician is listed as Theresa Oliver. If you have any questions after today's visit, please call [phone].

## 2017-06-14 ENCOUNTER — DOCUMENTATION ONLY (OUTPATIENT)
Dept: ORTHOPEDIC SURGERY | Age: 82
End: 2017-06-14

## 2017-07-18 DIAGNOSIS — M88.9 PAGET DISEASE OF BONE: Chronic | ICD-10-CM

## 2017-07-18 DIAGNOSIS — M54.50 CHRONIC BILATERAL LOW BACK PAIN WITHOUT SCIATICA: Chronic | ICD-10-CM

## 2017-07-18 DIAGNOSIS — G89.29 CHRONIC BILATERAL LOW BACK PAIN WITHOUT SCIATICA: Chronic | ICD-10-CM

## 2017-07-18 NOTE — TELEPHONE ENCOUNTER
Patient came to [facility] office saying he needed a prescription for pain pills and patches. He was not sure what the names are and seems a little confused. Please call him at [phone].

## 2017-07-19 NOTE — TELEPHONE ENCOUNTER
Called pt for clarification of names of medication for refill request. No answer left voice message to return call to the office. When pt returns call, need the name of the two prescriptions he is requesting refills on.

## 2017-07-19 NOTE — TELEPHONE ENCOUNTER
Please verify he is requesting his Norco and Duragesic patch. If he is, his UDS was consistent and we can write them.

## 2017-07-20 RX ORDER — HYDROCODONE BITARTRATE AND ACETAMINOPHEN 10; 325 MG/1; MG/1
1 TABLET ORAL 2 TIMES DAILY
Qty: 45 TAB | Refills: 0 | Status: SHIPPED | OUTPATIENT
Start: 2017-07-20 | End: 2017-08-31 | Stop reason: SDUPTHER

## 2017-07-20 RX ORDER — HYDROCODONE BITARTRATE AND ACETAMINOPHEN 10; 325 MG/1; MG/1
1 TABLET ORAL 2 TIMES DAILY
Qty: 45 TAB | Refills: 0 | Status: SHIPPED | OUTPATIENT
Start: 2017-08-19 | End: 2017-08-31 | Stop reason: SDUPTHER

## 2017-07-20 RX ORDER — FENTANYL 75 UG/H
1 PATCH TRANSDERMAL
Qty: 10 PATCH | Refills: 0 | Status: SHIPPED | OUTPATIENT
Start: 2017-07-20 | End: 2017-08-31 | Stop reason: SDUPTHER

## 2017-07-20 RX ORDER — FENTANYL 75 UG/H
1 PATCH TRANSDERMAL
Qty: 10 PATCH | Refills: 0 | Status: SHIPPED | OUTPATIENT
Start: 2017-08-19 | End: 2017-08-31 | Stop reason: SDUPTHER

## 2017-07-20 NOTE — TELEPHONE ENCOUNTER
Called pt for second attempt and was able to talk to patient. Pt states he needs refill of Norco and Fentanyl patches. Medications pend.

## 2017-08-29 ENCOUNTER — HOSPITAL ENCOUNTER (OUTPATIENT)
Dept: LAB | Age: 82
Discharge: HOME | End: 2017-08-29
Payer: MEDICARE

## 2017-08-29 DIAGNOSIS — E44.1 MALNUTRITION OF MILD DEGREE (HCC): ICD-10-CM

## 2017-08-29 DIAGNOSIS — M16.0 PRIMARY OSTEOARTHRITIS OF BOTH HIPS: ICD-10-CM

## 2017-08-29 DIAGNOSIS — E78.00 PURE HYPERCHOLESTEROLEMIA: ICD-10-CM

## 2017-08-29 DIAGNOSIS — I10 ESSENTIAL HYPERTENSION, MALIGNANT: ICD-10-CM

## 2017-08-29 DIAGNOSIS — N40.0 BENIGN ENLARGEMENT OF PROSTATE: ICD-10-CM

## 2017-08-29 DIAGNOSIS — H40.10X0 GLAUCOMA, OPEN ANGLE: ICD-10-CM

## 2017-08-29 LAB
ALBUMIN SERPL-MCNC: 3.4 G/DL (ref 3.4–5)
ALBUMIN/GLOB SERPL: 0.9 {RATIO} (ref 0.8–1.7)
ALP SERPL-CCNC: 379 U/L (ref 45–117)
ALT SERPL-CCNC: 17 U/L (ref 16–61)
ANION GAP SERPL CALC-SCNC: 6 MMOL/L (ref 3–18)
AST SERPL-CCNC: 18 U/L (ref 15–37)
BASOPHILS # BLD: 0 K/UL (ref 0–0.06)
BASOPHILS NFR BLD: 0 % (ref 0–2)
BILIRUB SERPL-MCNC: 0.4 MG/DL (ref 0.2–1)
BUN SERPL-MCNC: 28 MG/DL (ref 7–18)
BUN/CREAT SERPL: 18 (ref 12–20)
CALCIUM SERPL-MCNC: 8.6 MG/DL (ref 8.5–10.1)
CHLORIDE SERPL-SCNC: 108 MMOL/L (ref 100–108)
CHOLEST SERPL-MCNC: 108 MG/DL
CO2 SERPL-SCNC: 26 MMOL/L (ref 21–32)
CREAT SERPL-MCNC: 1.53 MG/DL (ref 0.6–1.3)
DIFFERENTIAL METHOD BLD: ABNORMAL
EOSINOPHIL # BLD: 0.3 K/UL (ref 0–0.4)
EOSINOPHIL NFR BLD: 4 % (ref 0–5)
ERYTHROCYTE [DISTWIDTH] IN BLOOD BY AUTOMATED COUNT: 15 % (ref 11.6–14.5)
GLOBULIN SER CALC-MCNC: 4 G/DL (ref 2–4)
GLUCOSE SERPL-MCNC: 87 MG/DL (ref 74–99)
HCT VFR BLD AUTO: 37.8 % (ref 36–48)
HDLC SERPL-MCNC: 34 MG/DL (ref 40–60)
HDLC SERPL: 3.2 {RATIO} (ref 0–5)
HGB BLD-MCNC: 11.9 G/DL (ref 13–16)
LDLC SERPL CALC-MCNC: 57 MG/DL (ref 0–100)
LIPID PROFILE,FLP: ABNORMAL
LYMPHOCYTES # BLD: 2.9 K/UL (ref 0.9–3.6)
LYMPHOCYTES NFR BLD: 42 % (ref 21–52)
MCH RBC QN AUTO: 27.1 PG (ref 24–34)
MCHC RBC AUTO-ENTMCNC: 31.5 G/DL (ref 31–37)
MCV RBC AUTO: 86.1 FL (ref 74–97)
MONOCYTES # BLD: 0.6 K/UL (ref 0.05–1.2)
MONOCYTES NFR BLD: 8 % (ref 3–10)
NEUTS SEG # BLD: 3.1 K/UL (ref 1.8–8)
NEUTS SEG NFR BLD: 46 % (ref 40–73)
PLATELET # BLD AUTO: 132 K/UL (ref 135–420)
PMV BLD AUTO: 11.2 FL (ref 9.2–11.8)
POTASSIUM SERPL-SCNC: 4.2 MMOL/L (ref 3.5–5.5)
PROT SERPL-MCNC: 7.4 G/DL (ref 6.4–8.2)
RBC # BLD AUTO: 4.39 M/UL (ref 4.7–5.5)
SODIUM SERPL-SCNC: 140 MMOL/L (ref 136–145)
TRIGL SERPL-MCNC: 85 MG/DL (ref ?–150)
VLDLC SERPL CALC-MCNC: 17 MG/DL
WBC # BLD AUTO: 6.8 K/UL (ref 4.6–13.2)

## 2017-08-29 PROCEDURE — 80053 COMPREHEN METABOLIC PANEL: CPT | Performed by: INTERNAL MEDICINE

## 2017-08-29 PROCEDURE — 85025 COMPLETE CBC W/AUTO DIFF WBC: CPT | Performed by: INTERNAL MEDICINE

## 2017-08-29 PROCEDURE — 80061 LIPID PANEL: CPT | Performed by: INTERNAL MEDICINE

## 2017-08-29 PROCEDURE — 36415 COLL VENOUS BLD VENIPUNCTURE: CPT | Performed by: INTERNAL MEDICINE

## 2017-08-31 ENCOUNTER — OFFICE VISIT (OUTPATIENT)
Dept: ORTHOPEDIC SURGERY | Age: 82
End: 2017-08-31

## 2017-08-31 VITALS
OXYGEN SATURATION: 99 % | WEIGHT: 147 LBS | TEMPERATURE: 97.8 F | SYSTOLIC BLOOD PRESSURE: 107 MMHG | BODY MASS INDEX: 19.48 KG/M2 | HEIGHT: 73 IN | DIASTOLIC BLOOD PRESSURE: 76 MMHG | RESPIRATION RATE: 18 BRPM | HEART RATE: 85 BPM

## 2017-08-31 DIAGNOSIS — G89.29 CHRONIC BILATERAL LOW BACK PAIN WITHOUT SCIATICA: Primary | Chronic | ICD-10-CM

## 2017-08-31 DIAGNOSIS — M54.50 CHRONIC BILATERAL LOW BACK PAIN WITHOUT SCIATICA: Primary | Chronic | ICD-10-CM

## 2017-08-31 DIAGNOSIS — M88.9 PAGET DISEASE OF BONE: ICD-10-CM

## 2017-08-31 RX ORDER — FENTANYL 75 UG/H
PATCH TRANSDERMAL
Qty: 10 PATCH | Refills: 0 | Status: SHIPPED | OUTPATIENT
Start: 2017-11-16 | End: 2017-11-29 | Stop reason: SDUPTHER

## 2017-08-31 RX ORDER — HYDROCODONE BITARTRATE AND ACETAMINOPHEN 10; 325 MG/1; MG/1
TABLET ORAL
Qty: 45 TAB | Refills: 0 | Status: SHIPPED | OUTPATIENT
Start: 2017-10-17 | End: 2017-11-29 | Stop reason: SDUPTHER

## 2017-08-31 RX ORDER — FENTANYL 75 UG/H
PATCH TRANSDERMAL
Qty: 10 PATCH | Refills: 0 | Status: SHIPPED | OUTPATIENT
Start: 2017-09-18 | End: 2017-11-29 | Stop reason: SDUPTHER

## 2017-08-31 RX ORDER — HYDROCODONE BITARTRATE AND ACETAMINOPHEN 10; 325 MG/1; MG/1
TABLET ORAL
Qty: 45 TAB | Refills: 0 | Status: SHIPPED | OUTPATIENT
Start: 2017-09-18 | End: 2017-11-29 | Stop reason: SDUPTHER

## 2017-08-31 RX ORDER — HYDROCODONE BITARTRATE AND ACETAMINOPHEN 10; 325 MG/1; MG/1
TABLET ORAL
Qty: 45 TAB | Refills: 0 | Status: SHIPPED | OUTPATIENT
Start: 2017-11-16 | End: 2017-11-29 | Stop reason: SDUPTHER

## 2017-08-31 RX ORDER — FENTANYL 75 UG/H
PATCH TRANSDERMAL
Qty: 10 PATCH | Refills: 0 | Status: SHIPPED | OUTPATIENT
Start: 2017-10-17 | End: 2017-11-29 | Stop reason: SDUPTHER

## 2017-08-31 NOTE — MR AVS SNAPSHOT
Visit Information Date & Time Provider Department Dept. Phone Encounter #  
 8/31/2017  9:45 AM Ryan Velazquez MD [facility] [phone] [id number] Follow-up Instructions Return in about 3 months (around 11/30/2017) for med f/u. Upcoming Health Maintenance Date Due DTaP/Tdap/Td series (1 - Tdap) 3/14/1948 ZOSTER VACCINE AGE 60> 1/14/1987 GLAUCOMA SCREENING Q2Y 3/14/1992 Pneumococcal 65+ High/Highest Risk (1 of 2 - PCV13) 3/14/1992 MEDICARE YEARLY EXAM 3/14/1992 INFLUENZA AGE 9 TO ADULT 8/1/2017 Allergies as of 8/31/2017  Review Complete On: 8/31/2017 By: [address], MD  
 No Known Allergies Current Immunizations  Never Reviewed No immunizations on file. Not reviewed this visit You Were Diagnosed With   
  
 Codes Comments Chronic bilateral low back pain without sciatica    -  Primary ICD-10-CM: M54.5, G89.29 ICD-9-CM: 724.2, 338.29 Paget disease of bone     ICD-10-CM: M88.9 ICD-9-CM: 731.0 Vitals BP Pulse Temp Resp Height(growth percentile) Weight(growth percentile) 107/76 85 97.8 °F (36.6 °C) (Oral) 18 6' 1\" (1.854 m) 147 lb (66.7 kg) SpO2 BMI Smoking Status 99% 19.39 kg/m2 Never Smoker BMI and BSA Data Body Mass Index Body Surface Area  
 19.39 kg/m 2 1.85 m 2 Preferred Pharmacy Pharmacy Name Phone [facility] [address] [address] [address] [place] [address] [phone] Your Updated Medication List  
  
   
This list is accurate as of: 8/31/17 10:40 AM.  Always use your most recent med list.  
  
  
  
  
 COMBIGAN 0.2-0.5 % Drop ophthalmic solution Generic drug:  brimonidine-timolol Administer 1 drop to both eyes every twelve (12) hours. dorzolamide 2 % ophthalmic solution Commonly known as:  TRUSOPT  
  
 dorzolamide-timolol 22.3-6.8 mg/mL ophthalmic solution Commonly known as:  COSOPT  
  
 erythromycin ophthalmic ointment Commonly known as:  ILOTYCIN  
  
 * fentaNYL 75 mcg/hr Commonly known as:  Rico Hodgson Apply 1 patch every 3 days for chronic pain Start taking on:  9/18/2017 * fentaNYL 75 mcg/hr Commonly known as:  Rico Hodgson Apply 1 patch every 3 days for chronic pain Start taking on:  10/17/2017 * fentaNYL 75 mcg/hr Commonly known as:  Rico Hodgson Apply 1 patch every 3 days for chronic pain Start taking on:  11/16/2017  
  
 * HYDROcodone-acetaminophen  mg tablet Commonly known as:  Sandi Domi Take 1 -2 tabs po daily prn chronic pain Start taking on:  9/18/2017  
  
 * HYDROcodone-acetaminophen  mg tablet Commonly known as:  Sandi Domi Take 1 -2 tabs po prn daily chronic pain Start taking on:  10/17/2017  
  
 * HYDROcodone-acetaminophen  mg tablet Commonly known as:  Sandi Domi Take 1-2 tabs po daily prn chronic pain Start taking on:  11/16/2017 LUMIGAN 0.01 % ophthalmic drops Generic drug:  bimatoprost  
Administer 1 Drop to both eyes nightly. RESTASIS 0.05 % ophthalmic emulsion Generic drug:  cycloSPORINE  
  
 timolol 0.5 % ophthalmic solution Commonly known as:  TIMOPTIC * Notice: This list has 6 medication(s) that are the same as other medications prescribed for you. Read the directions carefully, and ask your doctor or other care provider to review them with you. Prescriptions Printed Refills  
 fentaNYL (DURAGESIC) 75 mcg/hr 0 Starting on: 9/18/2017 Sig: Apply 1 patch every 3 days for chronic pain  
 Class: Print  
 fentaNYL (DURAGESIC) 75 mcg/hr 0 Starting on: 10/17/2017 Sig: Apply 1 patch every 3 days for chronic pain  
 Class: Print  
 fentaNYL (DURAGESIC) 75 mcg/hr 0 Starting on: 11/16/2017 Sig: Apply 1 patch every 3 days for chronic pain  
 Class: Print HYDROcodone-acetaminophen (NORCO)  mg tablet 0 Starting on: 9/18/2017 Sig: Take 1 -2 tabs po daily prn chronic pain  
 Class: Print HYDROcodone-acetaminophen (NORCO)  mg tablet 0 Starting on: 10/17/2017 Sig: Take 1 -2 tabs po prn daily chronic pain  
 Class: Print HYDROcodone-acetaminophen (NORCO)  mg tablet 0 Starting on: 11/16/2017 Sig: Take 1-2 tabs po daily prn chronic pain  
 Class: Print Follow-up Instructions Return in about 3 months (around 11/30/2017) for med f/u. Patient Instructions Learning About Prescribed Opioid Medicine for Chronic Pain Introduction Opioids are medicines used to relieve moderate to severe pain. They may be used for pain that may go on for a long time, such as for cancer or arthritis. Opioids relieve pain by changing the way your body feels pain and the way you feel about pain. They don't cure a health problem. But they do help you manage the pain. Your doctor will talk with you about how they fit into your overall treatment plan. Doctors follow a strict set of rules for giving opioids to their patients who have long-term (chronic) pain. This is because these are powerful medicines. They can cause problems if they are not used correctly. They can also be misused. Getting a prescription for an opioid may seem hard to do. You may feel like your doctor doesn't trust you. Your doctor understands this. But the rules are the same for everyone. They help make sure that the medicine will be used safely. What happens before you get a prescription? Your doctor may talk with you about your pain history. He or she may ask about your family history. You may have a physical exam. You may also see a pain specialist. This helps your doctor decide if an opioid is right for you. Your doctor will also review your history of opioid use. Most states have a program that tracks prescriptions. If your state does, your doctor will check it to see if you've had prescriptions for opioids before. You may have a urine test to check for opioids in your system. If you're a woman, you may have a pregnancy test. Opioids are not safe to take if you are pregnant. When your doctor prescribes the medicine, he or she will discuss your treatment plan with you. That includes the risks and benefits of opioid medicines. Your doctor will tell you how much pain relief and improved ability to function you can expect. The goal is to take the lowest dose that improves your pain for the shortest amount of time. Your doctor will also talk with you about other things you can do to help relieve pain. Your doctor may suggest different medicines or other options, such as steroid injections, ice or heat, physical therapy, or ways to reduce stress. You and your doctor will talk about your responsibilities. You'll both sign a written agreement. You will agree to take your medicine exactly as your doctor tells you to. Elisa Davis also agree to be careful with it and not to share it with others. What happens after you start to use your medicine? Regular follow-up visits to your doctor will help you and your doctor make sure that the medicine is the right treatment for your pain. At every visit, your doctor will check these things: · Is your pain being controlled? · Are you better able to function and be active? · Are you having any side effects, like constipation, nausea, or being too sleepy? · Are the non-opioid pain control measures working? Are there other things you can try? · Are there any signs that you are misusing your medicine? While you are taking an opioid, you may have drug tests and prescription history checks from time to time. Follow-up care is a key part of your treatment and safety. Be sure to make and go to all appointments, and call your doctor if you are having problems. It's also a good idea to know your test results and keep a list of the medicines you take. Where can you learn more? Go to http://rom-jose daniel.info/. Enter D374 in the search box to learn more about \"Learning About Prescribed Opioid Medicine for Chronic Pain. \" Current as of: November 15, 2016 Content Version: 11.3 © 3502-3492 [employer]. Care instructions adapted under license by [employer] (which disclaims liability or warranty for this information). If you have questions about a medical condition or this instruction, always ask your healthcare professional. [address] any warranty or liability for your use of this information. Please provide this summary of care documentation to your next provider. Your primary care clinician is listed as Makayla Line. If you have any questions after today's visit, please call [phone].

## 2017-08-31 NOTE — PATIENT INSTRUCTIONS
Learning About Prescribed Opioid Medicine for Chronic Pain  Introduction  Opioids are medicines used to relieve moderate to severe pain. They may be used for pain that may go on for a long time, such as for cancer or arthritis. Opioids relieve pain by changing the way your body feels pain and the way you feel about pain. They don't cure a health problem. But they do help you manage the pain. Your doctor will talk with you about how they fit into your overall treatment plan. Doctors follow a strict set of rules for giving opioids to their patients who have long-term (chronic) pain. This is because these are powerful medicines. They can cause problems if they are not used correctly. They can also be misused. Getting a prescription for an opioid may seem hard to do. You may feel like your doctor doesn't trust you. Your doctor understands this. But the rules are the same for everyone. They help make sure that the medicine will be used safely. What happens before you get a prescription? Your doctor may talk with you about your pain history. He or she may ask about your family history. You may have a physical exam. You may also see a pain specialist. This helps your doctor decide if an opioid is right for you. Your doctor will also review your history of opioid use. Most states have a program that tracks prescriptions. If your state does, your doctor will check it to see if you've had prescriptions for opioids before. You may have a urine test to check for opioids in your system. If you're a woman, you may have a pregnancy test. Opioids are not safe to take if you are pregnant. When your doctor prescribes the medicine, he or she will discuss your treatment plan with you. That includes the risks and benefits of opioid medicines. Your doctor will tell you how much pain relief and improved ability to function you can expect.  The goal is to take the lowest dose that improves your pain for the shortest amount of time. Your doctor will also talk with you about other things you can do to help relieve pain. Your doctor may suggest different medicines or other options, such as steroid injections, ice or heat, physical therapy, or ways to reduce stress. You and your doctor will talk about your responsibilities. You'll both sign a written agreement. You will agree to take your medicine exactly as your doctor tells you to. Sandip Nicholson also agree to be careful with it and not to share it with others. What happens after you start to use your medicine? Regular follow-up visits to your doctor will help you and your doctor make sure that the medicine is the right treatment for your pain. At every visit, your doctor will check these things:  · Is your pain being controlled? · Are you better able to function and be active? · Are you having any side effects, like constipation, nausea, or being too sleepy? · Are the non-opioid pain control measures working? Are there other things you can try? · Are there any signs that you are misusing your medicine? While you are taking an opioid, you may have drug tests and prescription history checks from time to time. Follow-up care is a key part of your treatment and safety. Be sure to make and go to all appointments, and call your doctor if you are having problems. It's also a good idea to know your test results and keep a list of the medicines you take. Where can you learn more? Go to http://rom-jose daniel.info/. Enter D374 in the search box to learn more about \"Learning About Prescribed Opioid Medicine for Chronic Pain. \"  Current as of: November 15, 2016  Content Version: 11.3  © 0380-0958 [employer]. Care instructions adapted under license by [employer] (which disclaims liability or warranty for this information).  If you have questions about a medical condition or this instruction, always ask your healthcare professional. London Gonzalez disclaims any warranty or liability for your use of this information.

## 2017-08-31 NOTE — PROGRESS NOTES
[address]  [address], [address]  [place], [address] [place] [address] Street  Phone: [phone]  Fax: [phone]        Brian Santos  : 3/14/1927  PCP: MD Hazel    PROGRESS NOTE      ASSESSMENT AND PLAN    Diagnoses and all orders for this visit:    1. Chronic bilateral low back pain without sciatica  -     fentaNYL (DURAGESIC) 75 mcg/hr; Apply 1 patch every 3 days for chronic pain  -     fentaNYL (DURAGESIC) 75 mcg/hr; Apply 1 patch every 3 days for chronic pain  -     fentaNYL (DURAGESIC) 75 mcg/hr; Apply 1 patch every 3 days for chronic pain  -     HYDROcodone-acetaminophen (NORCO)  mg tablet; Take 1 -2 tabs po daily prn chronic pain  -     HYDROcodone-acetaminophen (NORCO)  mg tablet; Take 1 -2 tabs po prn daily chronic pain  -     HYDROcodone-acetaminophen (NORCO)  mg tablet; Take 1-2 tabs po daily prn chronic pain    2. Paget disease of bone    1. Discussed tapering Duragesic patch to 50, patient reluctant, no side effects, doing well with current dose and changing Q4 days. 2. Continue Norco and Duragesic. 3. Safe use of opioids discussed with pt.    4. Given information on safe use of opioids for chronic pain. Follow-up Disposition:  Return in about 3 months (around 2017) for med f/u. Risks and benefits of ongoing opiate therapy have been reviewed with the patient.  is appropriate. UDS results have been consistent. No pain behaviors. Pt has a good risk to benefit ratio which allows the pt to function in a home environment without side effects. HISTORY OF PRESENT ILLNESS  Flash Floyd is a 80 y.o. male. Pt presents to the office for a f/u visit for chronic back pain and paget's disease. His condition remains unchanged since his last office visit. Pt has good and bad days with his pain. No shooting pains or paresthesias in his BLE. Pt wears a back brace every day. No saddle paresthesias.  He is using Duragesic patches 75 mcg every 3-4 days and Norco  mg 0-2 PRN for BTP. He uses Dulculax for constipation. Pt denies any dizziness, confusion, uncontrolled constipation, and cravings due to controlled substances. Denies persistent fevers, chills, weight changes, neurogenic bowel or bladder symptoms. Pt denies recent ED visits or hospitalizations. UDS from last visit was consistent with medication use. Pain Assessment  8/31/2017   Location of Pain Back   Location Modifiers (No Data)   Severity of Pain 6   Quality of Pain Aching   Quality of Pain Comment -   Duration of Pain A few hours   Frequency of Pain Intermittent   Date Pain First Started -   Aggravating Factors Walking;Standing   Limiting Behavior -   Relieving Factors Other (Comment)   Relieving Factors Comment medicine   Result of Injury -       PAST MEDICAL HISTORY   Past Medical History:   Diagnosis Date    Arthritis     Back pain, chronic        Past Surgical History:   Procedure Laterality Date    HX HEENT      relieved pressure R eye 1/2014   . MEDICATIONS    Current Outpatient Prescriptions   Medication Sig Dispense Refill    fentaNYL (DURAGESIC) 75 mcg/hr 1 Patch by TransDERmal route every seventy-two (72) hours. Max Daily Amount: 1 Patch. 10 Patch 0    fentaNYL (DURAGESIC) 75 mcg/hr 1 Patch by TransDERmal route every seventy-two (72) hours. Max Daily Amount: 1 Patch. 10 Patch 0    HYDROcodone-acetaminophen (NORCO)  mg tablet Take 1 Tab by mouth two (2) times a day. Max Daily Amount: 2 Tabs. 45 Tab 0    HYDROcodone-acetaminophen (NORCO)  mg tablet Take 1 Tab by mouth two (2) times a day. Max Daily Amount: 2 Tabs.  45 Tab 0    RESTASIS 0.05 % ophthalmic emulsion       dorzolamide-timolol (COSOPT) 22.3-6.8 mg/mL ophthalmic solution       dorzolamide (TRUSOPT) 2 % ophthalmic solution       erythromycin (ILOTYCIN) ophthalmic ointment       timolol (TIMOPTIC) 0.5 % ophthalmic solution       LUMIGAN 0.01 % ophthalmic drops Administer 1 Drop to both eyes nightly.  brimonidine-timolol (COMBIGAN) 0.2-0.5 % drop ophthalmic solution Administer 1 drop to both eyes every twelve (12) hours. ALLERGIES  No Known Allergies       SOCIAL HISTORY    Social History     Social History    Marital status:      Spouse name: N/A    Number of children: N/A    Years of education: N/A     Occupational History    Not on file. Social History Main Topics    Smoking status: Never Smoker    Smokeless tobacco: Never Used    Alcohol use No    Drug use: No    Sexual activity: Not on file     Other Topics Concern    Not on file     Social History Narrative       FAMILY HISTORY  Family History   Problem Relation Age of Onset    Diabetes Father     No Known Problems Mother        REVIEW OF SYSTEMS  Review of Systems   Constitutional: Negative for chills, fever and weight loss. Respiratory: Negative for shortness of breath. Cardiovascular: Negative for chest pain. Gastrointestinal: Negative for constipation. Negative for fecal incontinence   Genitourinary: Negative for dysuria. Negative for urinary incontinence   Musculoskeletal:        Per HPI   Skin: Negative for rash. Neurological: Negative for dizziness, tingling, tremors, focal weakness and headaches. Endo/Heme/Allergies: Does not bruise/bleed easily. Psychiatric/Behavioral: The patient does not have insomnia. PHYSICAL EXAMINATION  Visit Vitals    /76    Pulse 85    Temp 97.8 °F (36.6 °C) (Oral)    Resp 18    Ht 6' 1\" (1.854 m)    Wt 147 lb (66.7 kg)    SpO2 99%    BMI 19.39 kg/m2         Accompanied by self. Constitutional:  Well developed, well nourished, in no acute distress. Psychiatric: Affect and mood are appropriate. Integumentary: No rashes or abrasions noted on exposed areas. Cardiovascular/Peripheral Vascular: Intact l pulses. No peripheral edema is noted. Lymphatic:  No evidence of lymphedema.  No cervical lymphadenopathy. SPINE/MUSCULOSKELETAL EXAM      Lumbar spine:  No rash, ecchymosis, or gross obliquity. No fasciculations. No focal atrophy is noted. Increased muscle tone lumbar paraspinals. No tenderness to palpation at the sciatic notch. SI joints non-tender. Trochanters non tender. Sensation grossly intact to light touch. MOTOR:       Hip Flex  Quads Hamstrings Ankle DF EHL Ankle PF   Right +4/5 +4/5 +4/5 +4/5 +4/5 +4/5   Left +4/5 +4/5 +4/5 +4/5 +4/5 +4/5       Ambulation with single point cane. FWB. Written by Marta Garcia, as dictated by Weyman Mohs, MD.    I, Dr. Weyman Mohs, MD, confirm that all documentation is accurate. Mr. Gerda Matthews may have a reminder for a \"due or due soon\" health maintenance. I have asked that he contact his primary care provider for follow-up on this health maintenance.

## 2017-11-29 ENCOUNTER — OFFICE VISIT (OUTPATIENT)
Dept: ORTHOPEDIC SURGERY | Age: 82
End: 2017-11-29

## 2017-11-29 VITALS
BODY MASS INDEX: 20.01 KG/M2 | DIASTOLIC BLOOD PRESSURE: 70 MMHG | HEIGHT: 73 IN | OXYGEN SATURATION: 99 % | SYSTOLIC BLOOD PRESSURE: 117 MMHG | WEIGHT: 151 LBS | HEART RATE: 120 BPM

## 2017-11-29 DIAGNOSIS — M54.50 CHRONIC BILATERAL LOW BACK PAIN WITHOUT SCIATICA: Primary | Chronic | ICD-10-CM

## 2017-11-29 DIAGNOSIS — M88.9 PAGET DISEASE OF BONE: ICD-10-CM

## 2017-11-29 DIAGNOSIS — G89.29 CHRONIC BILATERAL LOW BACK PAIN WITHOUT SCIATICA: Primary | Chronic | ICD-10-CM

## 2017-11-29 RX ORDER — HYDROCODONE BITARTRATE AND ACETAMINOPHEN 10; 325 MG/1; MG/1
TABLET ORAL
Qty: 45 TAB | Refills: 0 | Status: SHIPPED | OUTPATIENT
Start: 2017-12-14 | End: 2018-02-19 | Stop reason: SDUPTHER

## 2017-11-29 RX ORDER — FENTANYL 75 UG/H
PATCH TRANSDERMAL
Qty: 10 PATCH | Refills: 0 | Status: SHIPPED | OUTPATIENT
Start: 2017-12-14 | End: 2021-08-13

## 2017-11-29 RX ORDER — HYDROCODONE BITARTRATE AND ACETAMINOPHEN 10; 325 MG/1; MG/1
TABLET ORAL
Qty: 45 TAB | Refills: 0 | Status: ON HOLD | OUTPATIENT
Start: 2018-01-12 | End: 2018-01-25

## 2017-11-29 RX ORDER — FENTANYL 75 UG/H
PATCH TRANSDERMAL
Qty: 10 PATCH | Refills: 0 | Status: ON HOLD | OUTPATIENT
Start: 2018-01-12 | End: 2018-01-25

## 2017-11-29 RX ORDER — HYDROCODONE BITARTRATE AND ACETAMINOPHEN 10; 325 MG/1; MG/1
TABLET ORAL
Qty: 45 TAB | Refills: 0 | Status: CANCELLED | OUTPATIENT
Start: 2018-02-10

## 2017-11-29 RX ORDER — FENTANYL 75 UG/H
PATCH TRANSDERMAL
Qty: 10 PATCH | Refills: 0 | Status: CANCELLED | OUTPATIENT
Start: 2018-02-10

## 2017-11-29 NOTE — PROGRESS NOTES
[facility] [address].  [address], [address]  [place], [address] [place] [address] Street  Phone: [phone]  Fax: [phone]        Renato Mclaughlin  : 3/14/1927  PCP: MD Hazel    PROGRESS NOTE      ASSESSMENT AND PLAN    Diagnoses and all orders for this visit:    1. Chronic bilateral low back pain without sciatica  -     fentaNYL (DURAGESIC) 75 mcg/hr; Apply 1 patch every 3 days for chronic pain  -     fentaNYL (DURAGESIC) 75 mcg/hr; Apply 1 patch every 3 days for chronic pain  -     HYDROcodone-acetaminophen (NORCO)  mg tablet; Take 1 -2 tabs po prn daily chronic pain  -     HYDROcodone-acetaminophen (NORCO)  mg tablet; Take 1-2 tabs po daily prn chronic pain  -     DRUG SCREEN UR - W/ CONFIRM; Future    2. Paget disease of bone    1. Compliant patient, needs referral to PM as MME is 200.  2. Safe use of opioids discussed with pt.    3. Activity as tolerated  4. Referral to pain management, [facility] has availability in Feb.   5. Continue Norco, Gabapentin, Dulcolax, and Duragesic. 6. Pt given two months of Rx to allow acceptance into pain management. Follow-up Disposition:  Return if symptoms worsen or fail to improve. Risks and benefits of ongoing opiate therapy have been reviewed with the patient. Per review of available records and patients , there are not signs of overuse, misuse, diversion, or concerning side effects. UDS results have been consistent. Pt has a good risk to benefit ratio which allows the pt to function in a home environment without side effects. HISTORY OF PRESENT ILLNESS  Pepito Watson is a 80 y.o. male. Pt presents to the office for a f/u visit for chronic back pain and medication management. Pt reports he has occasional pain into his lateral leg. He has good and bad days with his pain. Pt denies saddle paresthesias.  Pt states he has been using Duragesic patches 75 mcg every 3-4 days and Norco  mg PRN for BTP with relief. He states that he usually takes a couple of tabs of Norco every day. Pt denies any dizziness, confusion, uncontrolled constipation, and cravings due to controlled substances. He takes Gabapentin PRN. Denies persistent fevers, chills, weight changes, neurogenic bowel or bladder symptoms. Pt denies recent ED visits or hospitalizations. Hx of paget's disease. Pain effects sleep, work, standing, play and recreational activities, and his ability to perform ADLs. He has tried; PT-Yes,  Non-opioid medications Yes, and spinal injections Yes. Opioid Assessment    Least pain over the last week has been 3/10. Worst pain over the last week has been 10/10. Opioid Risk Tool Reviewed: YES, Score = 0  Aberrant behaviors: None. Urine Drug Screen: reviewed and up to date. Controlled substance agreement on file: YES.  reviewed:yes  Concomitant use of a benzodiazepine: no  MME is 200    Reports that pain would be a 10/10 w/out medication and that it goes down to a 2-3/10 after medication. This enables the pt to be functional, achieve ADLs and engage in social activities. Pain Assessment  11/29/2017   Location of Pain Back; Other (comment)   Pain Location Comment lower back, outside of right leg hurst intermittently   Location Modifiers -   Severity of Pain 7   Quality of Pain Aching   Quality of Pain Comment intermittent cramp/spasm in right leg   Duration of Pain Persistent   Frequency of Pain Constant   Date Pain First Started -   Aggravating Factors Walking   Limiting Behavior -   Relieving Factors Other (Comment)   Relieving Factors Comment med   Result of Injury No       PAST MEDICAL HISTORY   Past Medical History:   Diagnosis Date    Arthritis     Back pain, chronic        Past Surgical History:   Procedure Laterality Date    HX HEENT      relieved pressure R eye 1/2014   .       MEDICATIONS    Current Outpatient Prescriptions   Medication Sig Dispense Refill    fentaNYL (DURAGESIC) 75 mcg/hr Apply 1 patch every 3 days for chronic pain 10 Patch 0    fentaNYL (DURAGESIC) 75 mcg/hr Apply 1 patch every 3 days for chronic pain 10 Patch 0    fentaNYL (DURAGESIC) 75 mcg/hr Apply 1 patch every 3 days for chronic pain 10 Patch 0    HYDROcodone-acetaminophen (NORCO)  mg tablet Take 1 -2 tabs po daily prn chronic pain 45 Tab 0    HYDROcodone-acetaminophen (NORCO)  mg tablet Take 1 -2 tabs po prn daily chronic pain 45 Tab 0    HYDROcodone-acetaminophen (NORCO)  mg tablet Take 1-2 tabs po daily prn chronic pain 45 Tab 0    RESTASIS 0.05 % ophthalmic emulsion       dorzolamide-timolol (COSOPT) 22.3-6.8 mg/mL ophthalmic solution       dorzolamide (TRUSOPT) 2 % ophthalmic solution       erythromycin (ILOTYCIN) ophthalmic ointment       timolol (TIMOPTIC) 0.5 % ophthalmic solution       LUMIGAN 0.01 % ophthalmic drops Administer 1 Drop to both eyes nightly.  brimonidine-timolol (COMBIGAN) 0.2-0.5 % drop ophthalmic solution Administer 1 drop to both eyes every twelve (12) hours. ALLERGIES  No Known Allergies       SOCIAL HISTORY    Social History     Social History    Marital status:      Spouse name: N/A    Number of children: N/A    Years of education: N/A     Occupational History    Not on file. Social History Main Topics    Smoking status: Never Smoker    Smokeless tobacco: Never Used    Alcohol use No    Drug use: No    Sexual activity: Not on file     Other Topics Concern    Not on file     Social History Narrative       FAMILY HISTORY  Family History   Problem Relation Age of Onset    Diabetes Father     No Known Problems Mother        REVIEW OF SYSTEMS  Review of Systems   Constitutional: Negative for chills, fever and weight loss. Respiratory: Negative for shortness of breath. Cardiovascular: Negative for chest pain. Gastrointestinal: Positive for constipation.         Negative for fecal incontinence   Genitourinary: Negative for dysuria. Negative for urinary incontinence   Musculoskeletal:        Per HPI   Skin: Negative for rash. Neurological: Negative for dizziness, tingling, tremors, focal weakness and headaches. Endo/Heme/Allergies: Does not bruise/bleed easily. Psychiatric/Behavioral: The patient does not have insomnia. PHYSICAL EXAMINATION  Visit Vitals    /70    Pulse (!) 120    Ht 6' 1\" (1.854 m)    Wt 151 lb (68.5 kg)    SpO2 99%    BMI 19.92 kg/m2         Accompanied by family member. son      Constitutional:  Elderly pleasant, in no acute distress. Psychiatric: Affect and mood are appropriate. Integumentary: No rashes or abrasions noted on exposed areas. Cardiovascular/Peripheral Vascular: Intact l pulses. No peripheral edema is noted. Lymphatic:  No evidence of lymphedema. No cervical lymphadenopathy. SPINE/MUSCULOSKELETAL EXAM      Lumbar spine:  No rash, ecchymosis, or gross obliquity. No fasciculations. No focal atrophy is noted. No tenderness to palpation at the sciatic notch. SI joints non-tender. Trochanters non tender. Sensation grossly intact to light touch. MOTOR:       Hip Flex  Quads Hamstrings Ankle DF EHL Ankle PF   Right +4/5 +4/5 +4/5 +4/5 +4/5 +4/5   Left +4/5 +4/5 +4/5 +4/5 +4/5 +4/5       Straight Leg raise negative. Ambulation with single point cane. FWB. Written by Delilah Mckeon, as dictated by Elisa Barrientos MD.    I, Dr. Elisa Barrientos MD, confirm that all documentation is accurate. Mr. Anita Fernandez may have a reminder for a \"due or due soon\" health maintenance. I have asked that he contact his primary care provider for follow-up on this health maintenance.

## 2017-11-29 NOTE — PATIENT INSTRUCTIONS
Chronic Pain: Care Instructions  Your Care Instructions    Chronic pain is pain that lasts a long time (months or even years) and may or may not have a clear cause. It is different from acute pain, which usually does have a clear cause-like an injury or illness-and gets better over time. Chronic pain:  · Lasts over time but may vary from day to day. · Does not go away despite efforts to end it. · May disrupt your sleep and lead to fatigue. · May cause depression or anxiety. · May make your muscles tense, causing more pain. · Can disrupt your work, hobbies, home life, and relationships with friends and family. Chronic pain is a very real condition. It is not just in your head. Treatment can help and usually includes several methods used together, such as medicines, physical therapy, exercise, and other treatments. Learning how to relax and changing negative thought patterns can also help you cope. Chronic pain is complex. Taking an active role in your treatment will help you better manage your pain. Tell your doctor if you have trouble dealing with your pain. You may have to try several things before you find what works best for you. Follow-up care is a key part of your treatment and safety. Be sure to make and go to all appointments, and call your doctor if you are having problems. It's also a good idea to know your test results and keep a list of the medicines you take. How can you care for yourself at home? · Pace yourself. Break up large jobs into smaller tasks. Save harder tasks for days when you have less pain, or go back and forth between hard tasks and easier ones. Take rest breaks. · Relax, and reduce stress. Relaxation techniques such as deep breathing or meditation can help. · Keep moving. Gentle, daily exercise can help reduce pain over the long run. Try low- or no-impact exercises such as walking, swimming, and stationary biking. Do stretches to stay flexible.   · Try heat, cold packs, and massage. · Get enough sleep. Chronic pain can make you tired and drain your energy. Talk with your doctor if you have trouble sleeping because of pain. · Think positive. Your thoughts can affect your pain level. Do things that you enjoy to distract yourself when you have pain instead of focusing on the pain. See a movie, read a book, listen to music, or spend time with a friend. · If you think you are depressed, talk to your doctor about treatment. · Keep a daily pain diary. Record how your moods, thoughts, sleep patterns, activities, and medicine affect your pain. You may find that your pain is worse during or after certain activities or when you are feeling a certain emotion. Having a record of your pain can help you and your doctor find the best ways to treat your pain. · Take pain medicines exactly as directed. ¨ If the doctor gave you a prescription medicine for pain, take it as prescribed. ¨ If you are not taking a prescription pain medicine, ask your doctor if you can take an over-the-counter medicine. Reducing constipation caused by pain medicine  · Include fruits, vegetables, beans, and whole grains in your diet each day. These foods are high in fiber. · Drink plenty of fluids, enough so that your urine is light yellow or clear like water. If you have kidney, heart, or liver disease and have to limit fluids, talk with your doctor before you increase the amount of fluids you drink. · If your doctor recommends it, get more exercise. Walking is a good choice. Bit by bit, increase the amount you walk every day. Try for at least 30 minutes on most days of the week. · Schedule time each day for a bowel movement. A daily routine may help. Take your time and do not strain when having a bowel movement. When should you call for help? Call your doctor now or seek immediate medical care if:  ? · Your pain gets worse or is out of control.    ? · You feel down or blue, or you do not enjoy things like you once did. You may be depressed, which is common in people with chronic pain. Depression can be treated. ? · You have vomiting or cramps for more than 2 hours. ? Watch closely for changes in your health, and be sure to contact your doctor if:  ? · You cannot sleep because of pain. ? · You are very worried or anxious about your pain. ? · You have trouble taking your pain medicine. ? · You have any concerns about your pain medicine. ? · You have trouble with bowel movements, such as:  ¨ No bowel movement in 3 days. ¨ Blood in the anal area, in your stool, or on the toilet paper. ¨ Diarrhea for more than 24 hours. Where can you learn more? Go to http://rom-jose daniel.info/. Enter N004 in the search box to learn more about \"Chronic Pain: Care Instructions. \"  Current as of: October 14, 2016  Content Version: 11.4  © 3419-8539 [employer]. Care instructions adapted under license by [employer] (which disclaims liability or warranty for this information). If you have questions about a medical condition or this instruction, always ask your healthcare professional. [place] [address] any warranty or liability for your use of this information. Learning About Safe Use of Long-Acting Opioids  Introduction    Long-acting opioids relieve moderate to severe pain. They are also called extended-release opioids. Opioids relieve pain by changing the way your body feels pain. They don't cure a health problem. They help you manage the pain. If you take a lot of short-acting pain medicine, your doctor may give you long-acting opioids. They help you avoid the ups and downs in pain relief that you may have with short-acting medicine. Opioids are strong medicines. They can help you manage pain when you use them the right way. But if you misuse them, they can cause serious harm and even death.   If you decide to take opioids, here are some things to remember. · Keep your doctor informed. You can get addicted to opioids. The risk is higher if you have a history of substance use. Your doctor will monitor you closely for signs of misuse and addiction and to figure out when you no longer need to take opioids. · Make a treatment plan. The goal of your plan is to be able to function and do the things you need to do, even if you still have some pain. You might be able to manage your pain with other non-opioid options like physical therapy, relaxation, or over-the-counter pain medicines. · Be aware of the side effects. Opioids can cause serious side effects, such as constipation, dry mouth, and nausea. And over time, you may need a higher dose to get pain relief. This is called tolerance. Your body also gets used to opioids. This is called physical dependence. If you suddenly stop taking them, you may have withdrawal symptoms. Examples  · Fentanyl patch (Duragesic)  · Methadone (Dolophine)  · Morphine (Trina)  · Oxycodone controlled-release (OxyContin)  Safety tips  If you need to take opioids to manage your pain, remember these safety tips. · Follow directions carefully. It's easy to misuse opioids if you take a dose other than what's prescribed by your doctor. This can lead to overdose and even death. Even sharing them with someone they weren't meant for is misuse. · Be cautious. Opioids may affect your judgment and decision making. Do not drive or operate machinery until you can think clearly. Talk with your doctor about when it is safe to drive. · Reduce the risk of drug interactions. Opioids can be dangerous if you take them with alcohol or with certain drugs like sleeping pills and muscle relaxers. Make sure your doctor knows about all the other medicines you take, including over-the-counter medicines. Don't start any new medicines before you talk to your doctor or pharmacist.  · Keep others safe. Store opioids in a safe and secure place.  Make sure that pets, children, friends, and family can't get to them. When you're done using opioids, make sure to properly dispose of them. You can either use a community drug take-back program or your drugstore's mail-back program. If one of these programs isn't available, you can flush opioid skin patches and unused opioid pills down the toilet. · Reduce the risk of overdose. Misuse of opioids can be very dangerous. Protect yourself by asking your doctor about a naloxone rescue kit. It can help you-and even save your life-if you take too much of an opioid. Possible side effects  All medicines have side effects. But many people don't feel the side effects, or they are able to deal with them. You may:  · Feel confused or have a hard time thinking clearly. · Be constipated. · Feel faint, dizzy, or lightheaded. · Feel drowsy. · Feel sick to your stomach or vomit. · Have an allergic reaction. Where can you learn more? Go to http://rom-jose daniel.info/. Enter O105 in the search box to learn more about \"Learning About Safe Use of Long-Acting Opioids. \"  Current as of: October 14, 2016  Content Version: 11.4  © 9780-7427 [employer]. Care instructions adapted under license by [employer] (which disclaims liability or warranty for this information). If you have questions about a medical condition or this instruction, always ask your healthcare professional. [place] [address] any warranty or liability for your use of this information.                   The DO's and DONT's of Extended Release/ Long Acting Opioid Analgesics    DO        - Read the Medication Guide      - Take your medication exactly as prescribed      -  Store your medication away from children and in a safe locked-up place      - Flush unused medicine down the toilet or dispose at a local pharmacy that participates in a take back/disposal program.      - Tell your healthcare provider if you are pregnant or plan on becoming pregnant.       - Tell your healthcare provider your complete medical history, family history, including any history of substance abuse or mental illness. - Call your healthcare provider for medical advice about side effects. You may report side effects to the FDA at 0-435-FDA-9101    Call 911 or your local Emergency Service Right Away If:        - You take too much medicine      - You have trouble breathing      - A child has taken this medicine by accident        Talk to [facility] Provider        - If the dose you are taking does not control your pain      - About any side effects you may be having      - About all the medicines you take, including over-the-counter medicines, vitamins, and dietary supplements. DON'T        - DO NOT give your medicine to others      - DO NOT take any medicine that was not prescribed to you      - DO NOT stop taking your medicine without talking to your healthcare provider      - DO NOT cut, break, chew, crush, dissolve, snort, or inject your medicine. If you cannot swallow your medicine whole, talk to your healthcare provider.       - DO NOT drink alcohol or take illegal substances while taking this medicine.      Patient Specific Instructions/Information

## 2017-11-29 NOTE — MR AVS SNAPSHOT
Visit Information Date & Time Provider Department Dept. Phone Encounter #  
 11/29/2017  8:50 AM Ashley Lester MD [facility] [phone] [id number] Follow-up Instructions Return if symptoms worsen or fail to improve. Upcoming Health Maintenance Date Due DTaP/Tdap/Td series (1 - Tdap) 3/14/1948 ZOSTER VACCINE AGE 60> 1/14/1987 GLAUCOMA SCREENING Q2Y 3/14/1992 Pneumococcal 65+ High/Highest Risk (1 of 2 - PCV13) 3/14/1992 MEDICARE YEARLY EXAM 3/14/1992 Influenza Age 5 to Adult 8/1/2017 Allergies as of 11/29/2017  Review Complete On: 11/29/2017 By: Antonino Lazo No Known Allergies Current Immunizations  Never Reviewed No immunizations on file. Not reviewed this visit You Were Diagnosed With   
  
 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  
 117/70 (!) 120 6' 1\" (1.854 m) 151 lb (68.5 kg) 99% 19.92 kg/m2 Smoking Status Never Smoker BMI and BSA Data Body Mass Index Body Surface Area  
 19.92 kg/m 2 1.88 m 2 Preferred Pharmacy Pharmacy Name Phone [facility] [address],[facility] [address] [place] [address] [phone] Your Updated Medication List  
  
   
This list is accurate as of: 11/29/17  9:41 AM.  Always use your most recent med list.  
  
  
  
  
 COMBIGAN 0.2-0.5 % Drop ophthalmic solution Generic drug:  brimonidine-timolol Administer 1 drop to both eyes every twelve (12) hours. dorzolamide 2 % ophthalmic solution Commonly known as:  TRUSOPT  
  
 dorzolamide-timolol 22.3-6.8 mg/mL ophthalmic solution Commonly known as:  COSOPT  
  
 erythromycin ophthalmic ointment Commonly known as:  ILOTYCIN  
  
 * fentaNYL 75 mcg/hr Commonly known as:  Roly Lindsay Apply 1 patch every 3 days for chronic pain Start taking on:  12/14/2017 * fentaNYL 75 mcg/hr Commonly known as:  Roly Lindsay Apply 1 patch every 3 days for chronic pain Start taking on:  1/12/2018  
  
 * HYDROcodone-acetaminophen  mg tablet Commonly known as:  Liang Handler Take 1-2 tabs po daily prn chronic pain Start taking on:  12/14/2017  
  
 * HYDROcodone-acetaminophen  mg tablet Commonly known as:  Liang Handler Take 1 -2 tabs po prn daily chronic pain Start taking on:  1/12/2018 LUMIGAN 0.01 % ophthalmic drops Generic drug:  bimatoprost  
Administer 1 Drop to both eyes nightly. RESTASIS 0.05 % ophthalmic emulsion Generic drug:  cycloSPORINE  
  
 timolol 0.5 % ophthalmic solution Commonly known as:  TIMOPTIC * Notice: This list has 4 medication(s) that are the same as other medications prescribed for you. Read the directions carefully, and ask your doctor or other care provider to review them with you. Prescriptions Printed Refills  
 fentaNYL (DURAGESIC) 75 mcg/hr 0 Starting on: 1/12/2018 Sig: Apply 1 patch every 3 days for chronic pain  
 Class: Print  
 fentaNYL (DURAGESIC) 75 mcg/hr 0 Starting on: 12/14/2017 Sig: Apply 1 patch every 3 days for chronic pain  
 Class: Print HYDROcodone-acetaminophen (NORCO)  mg tablet 0 Starting on: 1/12/2018 Sig: Take 1 -2 tabs po prn daily chronic pain  
 Class: Print HYDROcodone-acetaminophen (NORCO)  mg tablet 0 Starting on: 12/14/2017 Sig: Take 1-2 tabs po daily prn chronic pain  
 Class: Print We Performed the Following REFERRAL TO PAIN MANAGEMENT [[id number] Custom] Comments: [facility] acceptance in February is ok Follow-up Instructions Return if symptoms worsen or fail to improve. Referral Information Referral ID Referred By Referred To [id number] Mary Murphy Not Available Visits Status Start Date End Date 1 New Request 11/29/17 11/29/18 If your referral has a status of pending review or denied, additional information will be sent to support the outcome of this decision. Patient Instructions Chronic Pain: Care Instructions Your Care Instructions Chronic pain is pain that lasts a long time (months or even years) and may or may not have a clear cause. It is different from acute pain, which usually does have a clear cause-like an injury or illness-and gets better over time. Chronic pain: 
· Lasts over time but may vary from day to day. · Does not go away despite efforts to end it. · May disrupt your sleep and lead to fatigue. · May cause depression or anxiety. · May make your muscles tense, causing more pain. · Can disrupt your work, hobbies, home life, and relationships with friends and family. Chronic pain is a very real condition. It is not just in your head. Treatment can help and usually includes several methods used together, such as medicines, physical therapy, exercise, and other treatments. Learning how to relax and changing negative thought patterns can also help you cope. Chronic pain is complex. Taking an active role in your treatment will help you better manage your pain. Tell your doctor if you have trouble dealing with your pain. You may have to try several things before you find what works best for you. Follow-up care is a key part of your treatment and safety. Be sure to make and go to all appointments, and call your doctor if you are having problems. It's also a good idea to know your test results and keep a list of the medicines you take. How can you care for yourself at home? · Pace yourself. Break up large jobs into smaller tasks. Save harder tasks for days when you have less pain, or go back and forth between hard tasks and easier ones. Take rest breaks. · Relax, and reduce stress. Relaxation techniques such as deep breathing or meditation can help. · Keep moving. Gentle, daily exercise can help reduce pain over the long run. Try low- or no-impact exercises such as walking, swimming, and stationary biking. Do stretches to stay flexible. · Try heat, cold packs, and massage. · Get enough sleep. Chronic pain can make you tired and drain your energy. Talk with your doctor if you have trouble sleeping because of pain. · Think positive. Your thoughts can affect your pain level. Do things that you enjoy to distract yourself when you have pain instead of focusing on the pain. See a movie, read a book, listen to music, or spend time with a friend. · If you think you are depressed, talk to your doctor about treatment. · Keep a daily pain diary. Record how your moods, thoughts, sleep patterns, activities, and medicine affect your pain. You may find that your pain is worse during or after certain activities or when you are feeling a certain emotion. Having a record of your pain can help you and your doctor find the best ways to treat your pain. · Take pain medicines exactly as directed. ¨ If the doctor gave you a prescription medicine for pain, take it as prescribed. ¨ If you are not taking a prescription pain medicine, ask your doctor if you can take an over-the-counter medicine. Reducing constipation caused by pain medicine · Include fruits, vegetables, beans, and whole grains in your diet each day. These foods are high in fiber. · Drink plenty of fluids, enough so that your urine is light yellow or clear like water. If you have kidney, heart, or liver disease and have to limit fluids, talk with your doctor before you increase the amount of fluids you drink. · If your doctor recommends it, get more exercise. Walking is a good choice. Bit by bit, increase the amount you walk every day. Try for at least 30 minutes on most days of the week. · Schedule time each day for a bowel movement. A daily routine may help. Take your time and do not strain when having a bowel movement. When should you call for help? Call your doctor now or seek immediate medical care if: 
? · Your pain gets worse or is out of control. ? · You feel down or blue, or you do not enjoy things like you once did. You may be depressed, which is common in people with chronic pain. Depression can be treated. ? · You have vomiting or cramps for more than 2 hours. ? Watch closely for changes in your health, and be sure to contact your doctor if: 
? · You cannot sleep because of pain. ? · You are very worried or anxious about your pain. ? · You have trouble taking your pain medicine. ? · You have any concerns about your pain medicine. ? · You have trouble with bowel movements, such as: 
¨ No bowel movement in 3 days. ¨ Blood in the anal area, in your stool, or on the toilet paper. ¨ Diarrhea for more than 24 hours. Where can you learn more? Go to http://rom-jose daniel.info/. Enter N004 in the search box to learn more about \"Chronic Pain: Care Instructions. \" Current as of: October 14, 2016 Content Version: 11.4 © 1495-3610 [employer]. Care instructions adapted under license by [employer] (which disclaims liability or warranty for this information). If you have questions about a medical condition or this instruction, always ask your healthcare professional. [place] [address] any warranty or liability for your use of this information. Learning About Safe Use of Long-Acting Opioids Introduction Long-acting opioids relieve moderate to severe pain. They are also called extended-release opioids. Opioids relieve pain by changing the way your body feels pain. They don't cure a health problem. They help you manage the pain.  
If you take a lot of short-acting pain medicine, your doctor may give you long-acting opioids. They help you avoid the ups and downs in pain relief that you may have with short-acting medicine. Opioids are strong medicines. They can help you manage pain when you use them the right way. But if you misuse them, they can cause serious harm and even death. If you decide to take opioids, here are some things to remember. · Keep your doctor informed. You can get addicted to opioids. The risk is higher if you have a history of substance use. Your doctor will monitor you closely for signs of misuse and addiction and to figure out when you no longer need to take opioids. · Make a treatment plan. The goal of your plan is to be able to function and do the things you need to do, even if you still have some pain. You might be able to manage your pain with other non-opioid options like physical therapy, relaxation, or over-the-counter pain medicines. · Be aware of the side effects. Opioids can cause serious side effects, such as constipation, dry mouth, and nausea. And over time, you may need a higher dose to get pain relief. This is called tolerance. Your body also gets used to opioids. This is called physical dependence. If you suddenly stop taking them, you may have withdrawal symptoms. Examples · Fentanyl patch (Duragesic) · Methadone (Dolophine) · Morphine (Trina) · Oxycodone controlled-release (OxyContin) Safety tips If you need to take opioids to manage your pain, remember these safety tips. · Follow directions carefully. It's easy to misuse opioids if you take a dose other than what's prescribed by your doctor. This can lead to overdose and even death. Even sharing them with someone they weren't meant for is misuse. · Be cautious. Opioids may affect your judgment and decision making. Do not drive or operate machinery until you can think clearly. Talk with your doctor about when it is safe to drive. · Reduce the risk of drug interactions.  Opioids can be dangerous if you take them with alcohol or with certain drugs like sleeping pills and muscle relaxers. Make sure your doctor knows about all the other medicines you take, including over-the-counter medicines. Don't start any new medicines before you talk to your doctor or pharmacist. 
· Keep others safe. Store opioids in a safe and secure place. Make sure that pets, children, friends, and family can't get to them. When you're done using opioids, make sure to properly dispose of them. You can either use a community drug take-back program or your drugstore's mail-back program. If one of these programs isn't available, you can flush opioid skin patches and unused opioid pills down the toilet. · Reduce the risk of overdose. Misuse of opioids can be very dangerous. Protect yourself by asking your doctor about a naloxone rescue kit. It can help you-and even save your life-if you take too much of an opioid. Possible side effects All medicines have side effects. But many people don't feel the side effects, or they are able to deal with them. You may: · Feel confused or have a hard time thinking clearly. · Be constipated. · Feel faint, dizzy, or lightheaded. · Feel drowsy. · Feel sick to your stomach or vomit. · Have an allergic reaction. Where can you learn more? Go to http://rom-jose daniel.info/. Enter O105 in the search box to learn more about \"Learning About Safe Use of Long-Acting Opioids. \" Current as of: October 14, 2016 Content Version: 11.4 © 6099-8403 [employer] Care instructions adapted under license by [employer] (which disclaims liability or warranty for this information). If you have questions about a medical condition or this instruction, always ask your healthcare professional. [address] any warranty or liability for your use of this information. The DO's and DONT's of Extended Release/ Long Acting Opioid Analgesics DO 
 
 - Read the Medication Guide - Take your medication exactly as prescribed -  Store your medication away from children and in a safe locked-up place - Flush unused medicine down the toilet or dispose at a local pharmacy that participates in a take back/disposal program. 
    - Tell your healthcare provider if you are pregnant or plan on becoming pregnant.  
    - Tell your healthcare provider your complete medical history, family history, including any history of substance abuse or mental illness. - Call your healthcare provider for medical advice about side effects. You may report side effects to the FDA at 2-712-FDA-4116 Call 911 or your local Emergency Service Right Away If: 
 
    - You take too much medicine - You have trouble breathing - A child has taken this medicine by accident Talk to Your Healthcare Provider - If the dose you are taking does not control your pain - About any side effects you may be having - About all the medicines you take, including over-the-counter medicines, vitamins, and dietary supplements. DON'T 
 
    - DO NOT give your medicine to others - DO NOT take any medicine that was not prescribed to you - DO NOT stop taking your medicine without talking to your healthcare provider - DO NOT cut, break, chew, crush, dissolve, snort, or inject your medicine. If you cannot swallow your medicine whole, talk to your healthcare provider.  
    - DO NOT drink alcohol or take illegal substances while taking this medicine. Patient Specific Instructions/Information Introducing [facility]! Dear Nu Stroud: Thank you for requesting a [employer] account. Our records indicate that you have previously registered for a [employer] account but its currently inactive. Please call our [employer] support line at [phone]. Additional Information If you have questions, please visit the Frequently Asked Questions section of the [employer] website at [email or web address] Remember, [employer] is NOT to be used for urgent needs. For medical emergencies, dial 911. Now available from your iPhone and Android! Please provide this summary of care documentation to your next provider. Your primary care clinician is listed as Hazel. If you have any questions after today's visit, please call [phone].

## 2018-01-25 ENCOUNTER — APPOINTMENT (OUTPATIENT)
Dept: GENERAL RADIOLOGY | Age: 83
DRG: 177 | End: 2018-01-25
Attending: PHYSICIAN ASSISTANT
Payer: MEDICARE

## 2018-01-25 ENCOUNTER — HOSPITAL ENCOUNTER (INPATIENT)
Age: 83
LOS: 4 days | Discharge: HOME | DRG: 177 | End: 2018-01-29
Attending: EMERGENCY MEDICINE | Admitting: INTERNAL MEDICINE
Payer: MEDICARE

## 2018-01-25 DIAGNOSIS — J18.9 PNEUMONIA OF RIGHT MIDDLE LOBE DUE TO INFECTIOUS ORGANISM: Primary | ICD-10-CM

## 2018-01-25 LAB
ALBUMIN SERPL-MCNC: 3.2 G/DL (ref 3.4–5)
ALBUMIN/GLOB SERPL: 0.7 {RATIO} (ref 0.8–1.7)
ALP SERPL-CCNC: 263 U/L (ref 45–117)
ALT SERPL-CCNC: 23 U/L (ref 16–61)
ANION GAP SERPL CALC-SCNC: 8 MMOL/L (ref 3–18)
AST SERPL-CCNC: 29 U/L (ref 15–37)
ATRIAL RATE: 102 BPM
BASOPHILS # BLD: 0 K/UL (ref 0–0.1)
BASOPHILS NFR BLD: 1 % (ref 0–2)
BILIRUB SERPL-MCNC: 0.4 MG/DL (ref 0.2–1)
BUN SERPL-MCNC: 26 MG/DL (ref 7–18)
BUN/CREAT SERPL: 20 (ref 12–20)
CALCIUM SERPL-MCNC: 8.5 MG/DL (ref 8.5–10.1)
CALCULATED P AXIS, ECG09: 78 DEGREES
CALCULATED R AXIS, ECG10: -17 DEGREES
CALCULATED T AXIS, ECG11: 53 DEGREES
CHLORIDE SERPL-SCNC: 108 MMOL/L (ref 100–108)
CHOLEST SERPL-MCNC: 90 MG/DL
CK MB CFR SERPL CALC: ABNORMAL % (ref 0–4)
CK MB SERPL-MCNC: <1 NG/ML (ref 5–25)
CK SERPL-CCNC: 46 U/L (ref 39–308)
CO2 SERPL-SCNC: 26 MMOL/L (ref 21–32)
CREAT SERPL-MCNC: 1.29 MG/DL (ref 0.6–1.3)
DIAGNOSIS, 93000: NORMAL
DIFFERENTIAL METHOD BLD: ABNORMAL
EOSINOPHIL # BLD: 0.1 K/UL (ref 0–0.4)
EOSINOPHIL NFR BLD: 2 % (ref 0–5)
ERYTHROCYTE [DISTWIDTH] IN BLOOD BY AUTOMATED COUNT: 13.5 % (ref 11.6–14.5)
EST. AVERAGE GLUCOSE BLD GHB EST-MCNC: 117 MG/DL
FLUAV AG NPH QL IA: NEGATIVE
FLUBV AG NOSE QL IA: NEGATIVE
GLOBULIN SER CALC-MCNC: 4.5 G/DL (ref 2–4)
GLUCOSE SERPL-MCNC: 99 MG/DL (ref 74–99)
HBA1C MFR BLD: 5.7 % (ref 4.2–5.6)
HCT VFR BLD AUTO: 35.8 % (ref 36–48)
HDLC SERPL-MCNC: 22 MG/DL (ref 40–60)
HDLC SERPL: 4.1 {RATIO} (ref 0–5)
HGB BLD-MCNC: 11.5 G/DL (ref 13–16)
LACTATE BLD-SCNC: 1.1 MMOL/L (ref 0.4–2)
LDLC SERPL CALC-MCNC: 46.6 MG/DL (ref 0–100)
LIPASE SERPL-CCNC: 451 U/L (ref 73–393)
LIPID PROFILE,FLP: ABNORMAL
LYMPHOCYTES # BLD: 1.4 K/UL (ref 0.9–3.6)
LYMPHOCYTES NFR BLD: 32 % (ref 21–52)
MCH RBC QN AUTO: 26.9 PG (ref 24–34)
MCHC RBC AUTO-ENTMCNC: 32.1 G/DL (ref 31–37)
MCV RBC AUTO: 83.6 FL (ref 74–97)
MONOCYTES # BLD: 0.6 K/UL (ref 0.05–1.2)
MONOCYTES NFR BLD: 14 % (ref 3–10)
NEUTS SEG # BLD: 2.2 K/UL (ref 1.8–8)
NEUTS SEG NFR BLD: 51 % (ref 40–73)
P-R INTERVAL, ECG05: 138 MS
PLATELET # BLD AUTO: 147 K/UL (ref 135–420)
PMV BLD AUTO: 11.4 FL (ref 9.2–11.8)
POTASSIUM SERPL-SCNC: 3.7 MMOL/L (ref 3.5–5.5)
PROT SERPL-MCNC: 7.7 G/DL (ref 6.4–8.2)
Q-T INTERVAL, ECG07: 378 MS
QRS DURATION, ECG06: 124 MS
QTC CALCULATION (BEZET), ECG08: 492 MS
RBC # BLD AUTO: 4.28 M/UL (ref 4.7–5.5)
SODIUM SERPL-SCNC: 142 MMOL/L (ref 136–145)
TRIGL SERPL-MCNC: 107 MG/DL (ref ?–150)
TROPONIN I SERPL-MCNC: 0.07 NG/ML (ref 0–0.04)
VENTRICULAR RATE, ECG03: 102 BPM
VLDLC SERPL CALC-MCNC: 21.4 MG/DL
WBC # BLD AUTO: 4.3 K/UL (ref 4.6–13.2)

## 2018-01-25 PROCEDURE — 96360 HYDRATION IV INFUSION INIT: CPT

## 2018-01-25 PROCEDURE — 83036 HEMOGLOBIN GLYCOSYLATED A1C: CPT | Performed by: INTERNAL MEDICINE

## 2018-01-25 PROCEDURE — 36415 COLL VENOUS BLD VENIPUNCTURE: CPT | Performed by: INTERNAL MEDICINE

## 2018-01-25 PROCEDURE — 94640 AIRWAY INHALATION TREATMENT: CPT

## 2018-01-25 PROCEDURE — 87076 CULTURE ANAEROBE IDENT EACH: CPT | Performed by: EMERGENCY MEDICINE

## 2018-01-25 PROCEDURE — 74011250636 HC RX REV CODE- 250/636: Performed by: EMERGENCY MEDICINE

## 2018-01-25 PROCEDURE — 85025 COMPLETE CBC W/AUTO DIFF WBC: CPT | Performed by: PHYSICIAN ASSISTANT

## 2018-01-25 PROCEDURE — 82550 ASSAY OF CK (CPK): CPT | Performed by: PHYSICIAN ASSISTANT

## 2018-01-25 PROCEDURE — 74011000250 HC RX REV CODE- 250: Performed by: EMERGENCY MEDICINE

## 2018-01-25 PROCEDURE — 65270000029 HC RM PRIVATE

## 2018-01-25 PROCEDURE — 84484 ASSAY OF TROPONIN QUANT: CPT | Performed by: INTERNAL MEDICINE

## 2018-01-25 PROCEDURE — 74011250637 HC RX REV CODE- 250/637: Performed by: INTERNAL MEDICINE

## 2018-01-25 PROCEDURE — 77030009834 HC MSK O2 TRACH VYRM -A

## 2018-01-25 PROCEDURE — 99285 EMERGENCY DEPT VISIT HI MDM: CPT

## 2018-01-25 PROCEDURE — 93005 ELECTROCARDIOGRAM TRACING: CPT

## 2018-01-25 PROCEDURE — 83690 ASSAY OF LIPASE: CPT | Performed by: PHYSICIAN ASSISTANT

## 2018-01-25 PROCEDURE — 94660 CPAP INITIATION&MGMT: CPT

## 2018-01-25 PROCEDURE — 80053 COMPREHEN METABOLIC PANEL: CPT | Performed by: PHYSICIAN ASSISTANT

## 2018-01-25 PROCEDURE — 74011250636 HC RX REV CODE- 250/636: Performed by: INTERNAL MEDICINE

## 2018-01-25 PROCEDURE — 87804 INFLUENZA ASSAY W/OPTIC: CPT | Performed by: EMERGENCY MEDICINE

## 2018-01-25 PROCEDURE — 87040 BLOOD CULTURE FOR BACTERIA: CPT | Performed by: EMERGENCY MEDICINE

## 2018-01-25 PROCEDURE — 80061 LIPID PANEL: CPT | Performed by: INTERNAL MEDICINE

## 2018-01-25 PROCEDURE — 74011250636 HC RX REV CODE- 250/636: Performed by: PHYSICIAN ASSISTANT

## 2018-01-25 PROCEDURE — 83605 ASSAY OF LACTIC ACID: CPT

## 2018-01-25 PROCEDURE — 74011000250 HC RX REV CODE- 250: Performed by: INTERNAL MEDICINE

## 2018-01-25 PROCEDURE — 71045 X-RAY EXAM CHEST 1 VIEW: CPT

## 2018-01-25 RX ORDER — OSELTAMIVIR PHOSPHATE 30 MG/1
30 CAPSULE ORAL 2 TIMES DAILY
Status: DISCONTINUED | OUTPATIENT
Start: 2018-01-25 | End: 2018-01-27

## 2018-01-25 RX ORDER — FENTANYL 75 UG/H
1 PATCH TRANSDERMAL
Status: DISCONTINUED | OUTPATIENT
Start: 2018-01-25 | End: 2018-01-29 | Stop reason: HOSPADM

## 2018-01-25 RX ORDER — SODIUM CHLORIDE 0.9 % (FLUSH) 0.9 %
5-10 SYRINGE (ML) INJECTION EVERY 8 HOURS
Status: DISCONTINUED | OUTPATIENT
Start: 2018-01-25 | End: 2018-01-29 | Stop reason: HOSPADM

## 2018-01-25 RX ORDER — IPRATROPIUM BROMIDE AND ALBUTEROL SULFATE 2.5; .5 MG/3ML; MG/3ML
3 SOLUTION RESPIRATORY (INHALATION)
Status: DISCONTINUED | OUTPATIENT
Start: 2018-01-25 | End: 2018-01-25

## 2018-01-25 RX ORDER — IPRATROPIUM BROMIDE AND ALBUTEROL SULFATE 2.5; .5 MG/3ML; MG/3ML
3 SOLUTION RESPIRATORY (INHALATION)
Status: DISCONTINUED | OUTPATIENT
Start: 2018-01-25 | End: 2018-01-29 | Stop reason: HOSPADM

## 2018-01-25 RX ORDER — CYCLOSPORINE 0.5 MG/ML
1 EMULSION OPHTHALMIC EVERY 12 HOURS
Status: DISCONTINUED | OUTPATIENT
Start: 2018-01-25 | End: 2018-01-25

## 2018-01-25 RX ORDER — ENOXAPARIN SODIUM 100 MG/ML
80 INJECTION SUBCUTANEOUS EVERY 12 HOURS
Status: DISCONTINUED | OUTPATIENT
Start: 2018-01-25 | End: 2018-01-26

## 2018-01-25 RX ORDER — TIMOLOL MALEATE 5 MG/ML
1 SOLUTION/ DROPS OPHTHALMIC 2 TIMES DAILY
Status: DISCONTINUED | OUTPATIENT
Start: 2018-01-25 | End: 2018-01-29 | Stop reason: HOSPADM

## 2018-01-25 RX ORDER — SODIUM CHLORIDE 0.9 % (FLUSH) 0.9 %
5-10 SYRINGE (ML) INJECTION AS NEEDED
Status: DISCONTINUED | OUTPATIENT
Start: 2018-01-25 | End: 2018-01-29 | Stop reason: HOSPADM

## 2018-01-25 RX ORDER — LOTEPREDNOL ETABONATE 5 MG/G
1 GEL OPHTHALMIC DAILY
COMMUNITY
End: 2021-08-13

## 2018-01-25 RX ORDER — BRIMONIDINE TARTRATE, TIMOLOL MALEATE 2; 5 MG/ML; MG/ML
1 SOLUTION/ DROPS OPHTHALMIC EVERY 12 HOURS
Status: DISCONTINUED | OUTPATIENT
Start: 2018-01-25 | End: 2018-01-25

## 2018-01-25 RX ORDER — LOTEPREDNOL ETABONATE 5 MG/ML
1 SUSPENSION/ DROPS OPHTHALMIC 4 TIMES DAILY
Status: ON HOLD | COMMUNITY
End: 2018-01-25

## 2018-01-25 RX ORDER — LATANOPROST 50 UG/ML
1 SOLUTION/ DROPS OPHTHALMIC
COMMUNITY
End: 2021-08-13

## 2018-01-25 RX ORDER — ACETAMINOPHEN 325 MG/1
650 TABLET ORAL
Status: DISCONTINUED | OUTPATIENT
Start: 2018-01-25 | End: 2018-01-29 | Stop reason: HOSPADM

## 2018-01-25 RX ORDER — GUAIFENESIN 100 MG/5ML
81 LIQUID (ML) ORAL DAILY
Status: DISCONTINUED | OUTPATIENT
Start: 2018-01-26 | End: 2018-01-29 | Stop reason: HOSPADM

## 2018-01-25 RX ADMIN — ENOXAPARIN SODIUM 80 MG: 80 INJECTION SUBCUTANEOUS at 21:37

## 2018-01-25 RX ADMIN — IPRATROPIUM BROMIDE AND ALBUTEROL SULFATE 3 ML: .5; 3 SOLUTION RESPIRATORY (INHALATION) at 21:37

## 2018-01-25 RX ADMIN — SODIUM CHLORIDE 1000 ML: 900 INJECTION, SOLUTION INTRAVENOUS at 12:47

## 2018-01-25 RX ADMIN — WATER 2 G: 1 INJECTION INTRAMUSCULAR; INTRAVENOUS; SUBCUTANEOUS at 15:15

## 2018-01-25 RX ADMIN — Medication 10 ML: at 21:37

## 2018-01-25 RX ADMIN — OSELTAMIVIR PHOSPHATE 30 MG: 30 CAPSULE ORAL at 21:37

## 2018-01-25 NOTE — H&P
Hospitalist Admission Note    NAME: Shaquille Jaime   :  3/14/1927   MRN:  [id number]     Date/Time of admission:  2018 3:05 PM    Patient PCP: Kiya Becerra MD  ________________________________________________________________________    My assessment of this patient's clinical condition and my plan of care is as follows. Assessment / Plan:  1. Right lower lobe community acquired pneumonia  2. 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 Generalized weakness d/t above  4. Elevated troponin, likely demand ischemia d/t sinus tachycardia and pna  5. Elevated lipase without clinical evidence of pancreatitis  6. Anorexia   7. CKD III (better than baseline)  8. Advanced age    3. Admit to med surg with IV abx and hydration  2. acapella and incentive spirometry  3. duonebs prn  4. Would give tamiflu empirically d/t false negative rate of rapid flu swab and high risk patient  5. Trend cardiac enzymes with treatment dose lovenox for 24 hours - doubt this is a primary cardiac manifestation; can digress with lovenox if trop remains flat   6. ASA, check lipids and a1c  7. Consider appetite stimulant if no improvement in hunger  8. Monitor abd pain for now - trial po intake. 9. PT/OT    Code Status: full  Surrogate Decision Maker: son    DVT Prophylaxis: lovenox treatment dose for now - can change to proph if trop remains flat. GI Prophylaxis: not indicated          Subjective:   CHIEF COMPLAINT: cough and congestion    HISTORY OF PRESENT ILLNESS:     Jaun Carter is a 80 y.o.  male who presents with worsening cough, congestion and weakness over the past 3 days. Pt has had decreased po intake and appetite. He presented to his PCP on yesterady and kevin started on po abx, but was not able to swallow the pills without gagging and subsequently worsened. Pt now presented to the ED and was noted to have RLL infiltrate on his cxr.  He was flu negative and, per chart review, had a mild bump in his troponin with an ekg showing ST with RBBB. Pt surrounded by supportive family. Pt grabbed his abd during our talk and endorsed epigastric pain. Pt's son stated pt had not eaten in 6 days. Pt stated he thought it was \"hunger pains. \"    We were asked to admit for work up and evaluation of the above problems. Past Medical History:   Diagnosis Date    Arthritis     Back pain, chronic         Past Surgical History:   Procedure Laterality Date    HX HEENT      relieved pressure R eye 1/2014       Social History   Substance Use Topics    Smoking status: Never Smoker    Smokeless tobacco: Never Used    Alcohol use No        Family History   Problem Relation Age of Onset    Diabetes Father     No Known Problems Mother      No Known Allergies     Prior to Admission medications    Medication Sig Start Date End Date Taking? Authorizing Provider   fentaNYL (DURAGESIC) 75 mcg/hr Apply 1 patch every 3 days for chronic pain 1/12/18   Brandy Sales MD   fentaNYL (DURAGESIC) 75 mcg/hr Apply 1 patch every 3 days for chronic pain 12/14/17   Brandy Sales MD   HYDROcodone-acetaminophen [facility])  mg tablet Take 1 -2 tabs po prn daily chronic pain 1/12/18   Brandy Sales MD   HYDROcodone-acetaminophen [facility])  mg tablet Take 1-2 tabs po daily prn chronic pain 12/14/17   Brandy Sales MD   RESTASIS 0.05 % ophthalmic emulsion  5/18/16   Historical Provider   dorzolamide-timolol (COSOPT) 22.3-6.8 mg/mL ophthalmic solution  3/2/16   Historical Provider   dorzolamide (TRUSOPT) 2 % ophthalmic solution  1/20/16   Historical Provider   erythromycin (ILOTYCIN) ophthalmic ointment  4/8/16   Historical Provider   timolol (TIMOPTIC) 0.5 % ophthalmic solution  3/9/16   Historical Provider   LUMIGAN 0.01 % ophthalmic drops Administer 1 Drop to both eyes nightly. 10/8/15   Historical Provider   brimonidine-timolol (COMBIGAN) 0.2-0.5 % drop ophthalmic solution Administer 1 drop to both eyes every twelve (12) hours. Carlos Rios MD       REVIEW OF SYSTEMS:     I am not able to complete the review of systems because: The patient is intubated and sedated    The patient has altered mental status due to his acute medical problems    The patient has baseline aphasia from prior stroke(s)    The patient has baseline dementia and is not reliable historian    The patient is in acute medical distress and unable to provide information           Total of 12 systems reviewed as follows:       POSITIVE= bolded text  Negative = text not underlined  General:  fever, chills, sweats, generalized weakness, weight loss/gain,      loss of appetite   Eyes:    blurred vision, eye pain, loss of vision, double vision  ENT:    rhinorrhea, pharyngitis   Respiratory:   cough, sputum production, SOB, HA, wheezing, pleuritic pain   Cardiology:   chest pain, palpitations, orthopnea, PND, edema, syncope   Gastrointestinal:  abdominal pain , N/V, diarrhea, dysphagia, constipation, bleeding, anorexia  Genitourinary:  frequency, urgency, dysuria, hematuria, incontinence   Muskuloskeletal :  arthralgia, myalgia, back pain  Hematology:  easy bruising, nose or gum bleeding, lymphadenopathy   Dermatological: rash, ulceration, pruritis, color change / jaundice  Endocrine:   hot flashes or polydipsia   Neurological:  headache, dizziness, confusion, focal weakness, paresthesia,     Speech difficulties, memory loss, gait difficulty  Psychological: Feelings of anxiety, depression, agitation    Objective:   VITALS:    Visit Vitals    /74    Pulse 74    Resp 14    SpO2 99%       PHYSICAL EXAM:    General:    Alert, cooperative, no distress, appears stated age. HEENT: Atraumatic, anicteric sclerae, pink conjunctivae     No oral ulcers, mucosa moist, throat clear, dentition fair  Neck:  Supple, symmetrical,  thyroid: non tender  Lungs:   Clear to auscultation bilaterally. No Wheezing or Rhonchi. No rales.   Chest wall:  No tenderness  No Accessory muscle use.  Heart:   Regular  rhythm,  No  murmur   No edema  Abdomen:   Soft, non-tender. Not distended. Bowel sounds normal  Extremities: No cyanosis. No clubbing,      Skin turgor normal, Capillary refill normal, Radial dial pulse 2+  Skin:     Not pale. Not Jaundiced  No rashes   Psych:  Good insight. Not depressed. Not anxious or agitated. Neurologic: EOMs intact. No facial asymmetry. No aphasia or slurred speech. Symmetrical strength, Sensation grossly intact. Alert and oriented X 4.     _______________________________________________________________________  Care Plan discussed with:    Comments   Patient x    Family  x    RN     Care Manager                    Consultant:      _______________________________________________________________________  Expected  Disposition:   Home with Family x   HH/PT/OT/RN x   SNF/LTC    ROYCE    ________________________________________________________________________  TOTAL TIME:  48 Minutes    Critical Care Provided     Minutes non procedure based      Comments     Reviewed previous records   >50% of visit spent in counseling and coordination of care  Discussion with patient and/or family and questions answered       ________________________________________________________________________      Procedures: see electronic medical records for all procedures/Xrays and details which were not copied into this note but were reviewed prior to creation of Plan.     LAB DATA REVIEWED:    Recent Results (from the past 24 hour(s))   EKG, 12 LEAD, INITIAL    Collection Time: 01/25/18 11:04 AM   Result Value Ref Range    Ventricular Rate 102 BPM    Atrial Rate 102 BPM    P-R Interval 138 ms    QRS Duration 124 ms    Q-T Interval 378 ms    QTC Calculation (Bezet) 492 ms    Calculated P Axis 78 degrees    Calculated R Axis -17 degrees    Calculated T Axis 53 degrees    Diagnosis       Sinus tachycardia  Right bundle branch block  Septal infarct , age undetermined  Abnormal ECG  When compared with ECG of 23-MAR-2015 11:58,  No significant change was found     METABOLIC PANEL, COMPREHENSIVE    Collection Time: 01/25/18 12:22 PM   Result Value Ref Range    Sodium 142 136 - 145 mmol/L    Potassium 3.7 3.5 - 5.5 mmol/L    Chloride 108 100 - 108 mmol/L    CO2 26 21 - 32 mmol/L    Anion gap 8 3.0 - 18 mmol/L    Glucose 99 74 - 99 mg/dL    BUN 26 (H) 7.0 - 18 MG/DL    Creatinine 1.29 0.6 - 1.3 MG/DL    BUN/Creatinine ratio 20 12 - 20      GFR est AA >60 >60 ml/min/1.73m2    GFR est non-AA 52 (L) >60 ml/min/1.73m2    Calcium 8.5 8.5 - 10.1 MG/DL    Bilirubin, total 0.4 0.2 - 1.0 MG/DL    ALT (SGPT) 23 16 - 61 U/L    AST (SGOT) 29 15 - 37 U/L    Alk. phosphatase 263 (H) 45 - 117 U/L    Protein, total 7.7 6.4 - 8.2 g/dL    Albumin 3.2 (L) 3.4 - 5.0 g/dL    Globulin 4.5 (H) 2.0 - 4.0 g/dL    A-G Ratio 0.7 (L) 0.8 - 1.7     LIPASE    Collection Time: 01/25/18 12:22 PM   Result Value Ref Range    Lipase 451 (H) 73 - 393 U/L   CBC WITH AUTOMATED DIFF    Collection Time: 01/25/18 12:22 PM   Result Value Ref Range    WBC 4.3 (L) 4.6 - 13.2 K/uL    RBC 4.28 (L) 4.70 - 5.50 M/uL    HGB 11.5 (L) 13.0 - 16.0 g/dL    HCT 35.8 (L) 36.0 - 48.0 %    MCV 83.6 74.0 - 97.0 FL    MCH 26.9 24.0 - 34.0 PG    MCHC 32.1 31.0 - 37.0 g/dL    RDW 13.5 11.6 - 14.5 %    PLATELET 649 119 - 502 K/uL    MPV 11.4 9.2 - 11.8 FL    NEUTROPHILS 51 40 - 73 %    LYMPHOCYTES 32 21 - 52 %    MONOCYTES 14 (H) 3 - 10 %    EOSINOPHILS 2 0 - 5 %    BASOPHILS 1 0 - 2 %    ABS. NEUTROPHILS 2.2 1.8 - 8.0 K/UL    ABS. LYMPHOCYTES 1.4 0.9 - 3.6 K/UL    ABS. MONOCYTES 0.6 0.05 - 1.2 K/UL    ABS. EOSINOPHILS 0.1 0.0 - 0.4 K/UL    ABS.  BASOPHILS 0.0 0.0 - 0.1 K/UL    DF AUTOMATED     CARDIAC PANEL,(CK, CKMB & TROPONIN)    Collection Time: 01/25/18 12:22 PM   Result Value Ref Range    CK 46 39 - 308 U/L    CK - MB <1.0 <3.6 ng/ml    CK-MB Index  0.0 - 4.0 %     CALCULATION NOT PERFORMED WHEN RESULT IS BELOW LINEAR LIMIT    Troponin-I, Qt. 0.07 (H) 0.0 - 0.045 NG/ML   INFLUENZA A & B AG (RAPID TEST)    Collection Time: 01/25/18 12:50 PM   Result Value Ref Range    Influenza A Antigen NEGATIVE  NEG      Influenza B Antigen NEGATIVE  NEG     POC LACTIC ACID    Collection Time: 01/25/18 12:58 PM   Result Value Ref Range    Lactic Acid (POC) 1.1 0.4 - 2.0 mmol/L       Kingston Gill MD  Internal Medicine  Hospitalist Division

## 2018-01-25 NOTE — ED NOTES
TRANSFER - OUT REPORT:    Verbal report given to Justin palacio RN (name) on Ramirez Guerra  being transferred to [address] [facility] room 460 (unit) for routine progression of care       Report consisted of patients Situation, Background, Assessment and   Recommendations(SBAR). Information from the following report(s) SBAR, ED Summary, [facility] and Recent Results was reviewed with the receiving nurse. Lines:   Peripheral IV 01/25/18 Left Wrist (Active)   Site Assessment Clean, dry, & intact 1/25/2018 12:26 PM   Phlebitis Assessment 0 1/25/2018 12:26 PM   Infiltration Assessment 0 1/25/2018 12:26 PM   Dressing Status Clean, dry, & intact 1/25/2018 12:26 PM   Dressing Type Transparent 1/25/2018 12:26 PM        Opportunity for questions and clarification was provided.       Patient transported with:   [employer]

## 2018-01-25 NOTE — IP AVS SNAPSHOT
[address] Patient: Fawad Cummins MRN: [id number] FWI:[id number] About your hospitalization You were admitted on:  January 25, 2018 You last received care in the:  [facility] [address] You were discharged on:  January 29, 2018 Why you were hospitalized Your primary diagnosis was:  Not on File Your diagnoses also included:  Pneumonia, Community Acquired Pneumonia Follow-up Information Follow up With Details Comments Contact Info MD Hazel On 2/6/2018 @ 1:30 [address] [place] [address] 
[phone] Discharge Orders None A check janes indicates which time of day the medication should be taken. My Medications START taking these medications Instructions Each Dose to Equal  
 Morning Noon Evening Bedtime  
 amoxicillin-clavulanate 500-125 mg per tablet Commonly known as:  AUGMENTIN Your last dose was: Your next dose is: Take 1 Tab by mouth every twelve (12) hours for 5 days. 1 Tab  
    
   
   
   
  
 bisacodyl 5 mg EC tablet Commonly known as:  DULCOLAX Your last dose was: Your next dose is: Take 1 Tab by mouth daily as needed for Constipation. 5 mg  
    
   
   
   
  
 guaiFENesin 100 mg/5 mL liquid Commonly known as:  ROBITUSSIN Your last dose was: Your next dose is: Take 5 mL by mouth every four (4) hours as needed for Cough. 100 mg  
    
   
   
   
  
 lactobacillus-acidophilus 100 million cell Grpk Commonly known as:  Bonnye Goldmann Your last dose was: Your next dose is: Take 1 Packet by mouth two (2) times a day for 8 days. 1 Packet  
    
   
   
   
  
 senna-docusate 8.6-50 mg per tablet Commonly known as:  Miles Arthur Start taking on:  1/30/2018 Your last dose was: Your next dose is: Take 2 Tabs by mouth daily. 2 Tab  
    
   
   
   
  
 therapeutic multivitamin tablet Commonly known as:  [facility] Start taking on:  1/30/2018 Your last dose was: Your next dose is: Take 1 Tab by mouth daily. 1 Tab CONTINUE taking these medications Instructions Each Dose to Equal  
 Morning Noon Evening Bedtime  
 fentaNYL 75 mcg/hr Commonly known as:  Xavier Sox Your last dose was: Your next dose is:    
   
   
 Apply 1 patch every 3 days for chronic pain  
     
   
   
   
  
 gabapentin 300 mg capsule Commonly known as:  NEURONTIN Your last dose was: Your next dose is: Take 300 mg by mouth three (3) times daily. 300 mg HYDROcodone-acetaminophen  mg tablet Commonly known as:  oRsemarie Fossor Your last dose was: Your next dose is: Take 1-2 tabs po daily prn chronic pain  
     
   
   
   
  
 latanoprost 0.005 % ophthalmic solution Commonly known as:  Radha Weston Your last dose was: Your next dose is:    
   
   
 Administer 1 Drop to both eyes nightly. 1 Drop LOTEMAX 0.5 % Drpg Generic drug:  loteprednol etabonate Your last dose was: Your next dose is:    
   
   
 Administer 1 Each to both eyes daily. (1) drop in both eyes every day 1 Each PROLENSA 0.07 % ophthalmic solution Generic drug:  bromfenac Your last dose was: Your next dose is:    
   
   
 Administer 1 Drop to both eyes daily. 1 Drop REFRESH CLASSIC (PF) 1.4-0.6 % ophthalmic solution Generic drug:  polyvinyl alcohol-povidon(PF)  
   
Your last dose was: Your next dose is:    
   
   
 Administer 1-2 Drops to both eyes as needed. 1-2 Drop  
    
   
   
   
  
 timolol 0.5 % ophthalmic solution Commonly known as:  TIMOPTIC Your last dose was: Your next dose is:    
   
   
 Administer 1 Drop to both eyes two (2) times a day. 1 Drop Where to Get Your Medications Information on where to get these meds will be given to you by the nurse or doctor. ! Ask your nurse or doctor about these medications  
  amoxicillin-clavulanate 500-125 mg per tablet  
 bisacodyl 5 mg EC tablet  
 guaiFENesin 100 mg/5 mL liquid  
 lactobacillus-acidophilus 100 million cell Grpk  
 senna-docusate 8.6-50 mg per tablet  
 therapeutic multivitamin tablet Discharge Instructions Pneumonia: Care Instructions Your Care Instructions Pneumonia is an infection of the lungs. Most cases are caused by infections from bacteria or viruses. Pneumonia may be mild or very severe. If it is caused by bacteria, you will be treated with antibiotics. It may take a few weeks to a few months to recover fully from pneumonia, depending on how sick you were and whether your overall health is good. Follow-up care is a key part of your treatment and safety. Be sure to make and go to all appointments, and call your doctor if you are having problems. It's also a good idea to know your test results and keep a list of the medicines you take. How can you care for yourself at home? · Take your antibiotics exactly as directed. Do not stop taking the medicine just because you are feeling better. You need to take the full course of antibiotics. · Take your medicines exactly as prescribed. Call your doctor if you think you are having a problem with your medicine. · Get plenty of rest and sleep. You may feel weak and tired for a while, but your energy level will improve with time. · To prevent dehydration, drink plenty of fluids, enough so that your urine is light yellow or clear like water. Choose water and other caffeine-free clear liquids until you feel better.  If you have kidney, heart, or liver disease and have to limit fluids, talk with your doctor before you increase the amount of fluids you drink. · Take care of your cough so you can rest. A cough that brings up mucus from your lungs is common with pneumonia. It is one way your body gets rid of the infection. But if coughing keeps you from resting or causes severe fatigue and chest-wall pain, talk to your doctor. He or she may suggest that you take a medicine to reduce the cough. · Use a vaporizer or humidifier to add moisture to your bedroom. Follow the directions for cleaning the machine. · Do not smoke or allow others to smoke around you. Smoke will make your cough last longer. If you need help quitting, talk to your doctor about stop-smoking programs and medicines. These can increase your chances of quitting for good. · Take an over-the-counter pain medicine, such as acetaminophen (Tylenol), ibuprofen (Advil, Motrin), or naproxen (Aleve). Read and follow all instructions on the label. · Do not take two or more pain medicines at the same time unless the doctor told you to. Many pain medicines have acetaminophen, which is Tylenol. Too much acetaminophen (Tylenol) can be harmful. · If you were given a spirometer to measure how well your lungs are working, use it as instructed. This can help your doctor tell how your recovery is going. · To prevent pneumonia in the future, talk to your doctor about getting a flu vaccine (once a year) and a pneumococcal vaccine (one time only for most people). When should you call for help? Call 911 anytime you think you may need emergency care. For example, call if: 
? · You have severe trouble breathing. ?Call your doctor now or seek immediate medical care if: 
? · You cough up dark brown or bloody mucus (sputum). ? · You have new or worse trouble breathing. ? · You are dizzy or lightheaded, or you feel like you may faint. ?Watch closely for changes in your health, and be sure to contact your doctor if: 
? · You have a new or higher fever. ? · You are coughing more deeply or more often. ? · You are not getting better after 2 days (48 hours). ? · You do not get better as expected. Where can you learn more? Go to http://jemal.info/. Enter [id number] in the search box to learn more about \"Pneumonia: Care Instructions. \" Current as of: May 12, 2017 Learning About COPD and How to Prevent Lung Infections How do lung infections affect COPD? Lung infections like pneumonia and acute bronchitis are common causes of COPD flare-ups. And people who have COPD are more likely to get these lung infections, especially if they smoke. When you have COPD, it is important to know the symptoms of pneumonia and acute bronchitis and call your doctor if you have them. Symptoms include: · A cough that brings up more mucus than usual. 
· Fever. · Shortness of breath. What can you do to prevent these infections? Stay healthy · Get a flu shot every year. · Get a pneumococcal vaccine shot. If you have had one before, ask your doctor whether you need another dose. Two different types of pneumococcal vaccines are recommended for people ages 72 and older. · If you must be around people with colds or the flu, wash your hands often. · Do not smoke. This is the most important step you can take to prevent more damage to your lungs. If you need help quitting, talk to your doctor about stop-smoking programs and medicines. These can increase your chances of quitting for good. · Avoid secondhand smoke, air pollution, and high altitudes. Also avoid cold, dry air and hot, humid air. Stay at home with your windows closed when air pollution is bad. Exercise and eat well · If your doctor recommends it, get more exercise. Walking is a good choice. Bit by bit, increase the amount you walk every day.  Try for at least 30 minutes on most days of the week. · Eat regular, well-balanced meals. Eating right keeps your energy levels up and helps your body fight infection. · Get plenty of rest and sleep. Follow-up care is a key part of your treatment and safety. Be sure to make and go to all appointments, and call your doctor if you are having problems. It's also a good idea to know your test results and keep a list of the medicines you take. Where can you learn more? Go to http://rom-jose daniel.info/. Enter H391 in the search box to learn more about \"Learning About COPD and How to Prevent Lung Infections. \" Current as of: May 12, 2017 Content Version: 11.4 © 6929-9027 [employer]. Care instructions adapted under license by [employer] (which disclaims liability or warranty for this information). If you have questions about a medical condition or this instruction, always ask your healthcare professional. [employer] any warranty or liability for your use of this information. Patient armband removed and shredded Nursing Instructions: Contact your primary care physician, if you have increasing shortness of breath, with or without exertion. Nausea or vomiting where you cannot keep food, drink or medicine down. Chest pain that does not go away with rest or Content Version: 11.4 © 6095-0948 [employer]. Care instructions adapted under license by [employer] (which disclaims liability or warranty for this information). If you have questions about a medical condition or this instruction, always ask your healthcare professional. [address] any warranty or liability for your use of this information. [employer] Announcement We are excited to announce that we are making your provider's discharge notes available to you in M-KOPAt.   You will see these notes when they are completed and signed by the physician that discharged you from your recent hospital stay. If you have any questions or concerns about any information you see in Nurixt, please call the Health Information Department where you were seen or reach out to your Primary Care Provider for more information about your plan of care. Introducing [facility]! Dear Miguel Ramey: Thank you for requesting a [employer] account. Our records indicate that you have previously registered for a [employer] account but its currently inactive. Please call our [employer] support line at [phone]. Additional Information If you have questions, please visit the Frequently Asked Questions section of the [employer] website at [email or web address] Remember, [employer] is NOT to be used for urgent needs. For medical emergencies, dial 911. Now available from your iPhone and Android! Unresulted Labs-Please follow up with your PCP about these lab tests Order Current Status CULTURE, BLOOD Preliminary result CULTURE, BLOOD Preliminary result CULTURE, BLOOD Preliminary result Providers Seen During Your Hospitalization Provider Specialty Primary office phone Zulma Dickson MD Emergency Medicine [phone] Kathryn Campos MD Internal Medicine [phone] Mile Johnson MD Internal Medicine [phone] Your Primary Care Physician (PCP) Primary Care Physician Office Phone Office Fax Singh Stallings [phone] You are allergic to the following No active allergies Recent Documentation Height Weight BMI Smoking Status 1.854 m 63.4 kg 18.44 kg/m2 Never Smoker Emergency Contacts Name Discharge Info Relation Home Work Mobile [address] [facility] CAREGIVER [3] Child [2] [phone] Patient Belongings The following personal items are in your possession at time of discharge: Dental Appliances: None  Visual Aid: None      Home Medications: None   Jewelry: None  Clothing: Jacket/Coat, Pants, Shirt    Other Valuables: None Please provide this summary of care documentation to your next provider. Signatures-by signing, you are acknowledging that this After Visit Summary has been reviewed with you and you have received a copy. Patient Signature:  ____________________________________________________________ Date:  ____________________________________________________________  
  
Lindsay Yaquelin Provider Signature:  ____________________________________________________________ Date:  ____________________________________________________________

## 2018-01-25 NOTE — ED PROVIDER NOTES
EMERGENCY DEPARTMENT HISTORY AND PHYSICAL EXAM    2:20 PM      Date: 1/25/2018  Patient Name: Daily Wellington    History of Presenting Illness     Chief Complaint   Patient presents with    Shortness of Breath         History Provided By: Patient and Patient's Son    Chief Complaint: Breathing difficulty  Duration: 5 Hours  Timing:  Acute  Location: N/a  Quality: N/a  Severity: Mild  Modifying Factors: None  Associated Symptoms: SOB, loss of appetite, and weakness      Additional History (Context): Daily Wellington is a 80 y.o. male with chronic back pain and arthritis who presents with acute mild breathing difficulty that started 5 hours ago. Per pt's son, the pt went to his PCP yesterday and was diagnosed with bronchitis and dehydration. The pt has not been eating normally as he has had a loss of appetite for the past 5 days, but stated \"I'm hungry\" during his physical exam. The son complained that he has been significantly weaker for the past 8 days and was unable to get out of bed for the past 3 days. No other concerns, modifying factors, or symptoms were expressed by the pt at this time.       PCP: Davie Lombard, MD    Current Facility-Administered Medications   Medication Dose Route Frequency Provider Last Rate Last Dose    ampicillin-sulbactam (UNASYN) 3 g in 0.9% sodium chloride (MBP/ADV) 100 mL MBP  3 g IntraVENous Q6H Sydnee Pichardo  mL/hr at 01/28/18 1230 3 g at 01/28/18 1230    heparin (porcine) injection 5,000 Units  5,000 Units SubCUTAneous Q8H Isrrael Cooper PA-C   5,000 Units at 01/28/18 1402    bisacodyl (DULCOLAX) tablet 5 mg  5 mg Oral DAILY PRN Isrrael Cooper PA-C        guaiFENesin (ROBITUSSIN) 100 mg/5 mL oral liquid 100 mg  100 mg Oral Q4H PRN Isrrael Cooper PA-C        therapeutic multivitamin (THERAGRAN) tablet 1 Tab  1 Tab Oral DAILY Isrrael Cooper PA-C   1 Tab at 01/28/18 0848    fentaNYL (DURAGESIC) 75 mcg/hr patch 1 Patch  1 Patch TransDERmal Q72H [place] Juan M Bolton MD   1 Patch at 01/25/18 6397    timolol (TIMOPTIC) 0.5 % ophthalmic solution 1 Drop  1 Drop Both Eyes BID Colin Dow MD   1 Drop at 01/28/18 0681    sodium chloride (NS) flush 5-10 mL  5-10 mL IntraVENous Q8H Colin Dow MD   10 mL at 01/28/18 0527    sodium chloride (NS) flush 5-10 mL  5-10 mL IntraVENous PRN Colin Dow MD        acetaminophen (TYLENOL) tablet 650 mg  650 mg Oral Q4H PRN Colin Dow MD        albuterol-ipratropium (DUO-NEB) 2.5 MG-0.5 MG/3 ML  3 mL Nebulization QID RT Colin Dow MD   3 mL at 01/27/18 2039    aspirin chewable tablet 81 mg  81 mg Oral DAILY Colin Dow MD   81 mg at 01/28/18 8213       Past History     Past Medical History:  Past Medical History:   Diagnosis Date    Arthritis     Back pain, chronic        Past Surgical History:  Past Surgical History:   Procedure Laterality Date    HX HEENT      relieved pressure R eye 1/2014       Family History:  Family History   Problem Relation Age of Onset    Diabetes Father     No Known Problems Mother        Social History:  Social History   Substance Use Topics    Smoking status: Never Smoker    Smokeless tobacco: Never Used    Alcohol use No       Allergies:  No Known Allergies      Review of Systems       Review of Systems   Constitutional: Positive for appetite change (decreased). Negative for chills and fever. Respiratory: Positive for shortness of breath. Cardiovascular: Negative for chest pain. Gastrointestinal: Negative for diarrhea, nausea and vomiting. Neurological: Positive for weakness. All other systems reviewed and are negative. Physical Exam     Visit Vitals    /71 (BP 1 Location: Left arm)    Pulse 67    Temp 97.8 °F (36.6 °C)    Resp 18    Ht 6' 1\" (1.854 m)    Wt 63.4 kg (139 lb 12.8 oz)    SpO2 98%    BMI 18.44 kg/m2     Physical Exam   Constitutional: He is oriented to person, place, and time. He appears well-developed and well-nourished.  No distress. HENT:   Head: Normocephalic and atraumatic. Eyes: Conjunctivae and EOM are normal. Right eye exhibits no discharge. Left eye exhibits no discharge. No scleral icterus. Neck: Normal range of motion. Neck supple. No tracheal deviation present. Cardiovascular: Normal rate, regular rhythm and normal heart sounds. No murmur heard. Pulmonary/Chest: Effort normal. No respiratory distress. He has no wheezes. He has rhonchi. He has no rales. Scattered rhonchi in right lung   Abdominal: Soft. He exhibits no distension. There is no tenderness. There is no rebound and no guarding. Musculoskeletal: Normal range of motion. He exhibits no edema or deformity. Neurological: He is alert and oriented to person, place, and time. No cranial nerve deficit. Skin: Skin is warm and dry. He is not diaphoretic. Psychiatric: He has a normal mood and affect. His behavior is normal. Judgment and thought content normal.         Diagnostic Study Results     Labs -  No results found for this or any previous visit (from the past 12 hour(s)). 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 Recommend follow-up to resolution. Medical Decision Making   I am the first provider for this patient. I reviewed the vital signs, available nursing notes, past medical history, past surgical history, family history and social history. Vital Signs-Reviewed the patient's vital signs. EKG: Interpreted by the EP. Time Interpreted: 11:06 AM   Rate: 102   Rhythm: Sinus Tachycardia   Interpretation: RBBB.  No STEMI    Records Reviewed: Nursing Notes and Old Medical Records (Time of Review: 2:20 PM)    Provider Notes (Medical Decision Making): Admitted for tx of PNA. Diagnosis     Clinical Impression:   1. Pneumonia of right middle lobe due to infectious organism [facility])        Disposition: Admitted    Follow-up Information     None           Current Discharge Medication List      CONTINUE these medications which have NOT CHANGED    Details   gabapentin (NEURONTIN) 300 mg capsule Take 300 mg by mouth three (3) times daily. latanoprost (XALATAN) 0.005 % ophthalmic solution Administer 1 Drop to both eyes nightly. loteprednol etabonate (LOTEMAX) 0.5 % drpg Administer 1 Each to both eyes daily. (1) drop in both eyes every day      bromfenac (PROLENSA) 0.07 % ophthalmic solution Administer 1 Drop to both eyes daily. polyvinyl alcohol-povidon,PF, (REFRESH CLASSIC, PF,) 1.4-0.6 % ophthalmic solution Administer 1-2 Drops to both eyes as needed. fentaNYL (DURAGESIC) 75 mcg/hr Apply 1 patch every 3 days for chronic pain  Qty: 10 Patch, Refills: 0    Associated Diagnoses: Chronic bilateral low back pain without sciatica      HYDROcodone-acetaminophen (NORCO)  mg tablet Take 1-2 tabs po daily prn chronic pain  Qty: 45 Tab, Refills: 0    Associated Diagnoses: Chronic bilateral low back pain without sciatica      timolol (TIMOPTIC) 0.5 % ophthalmic solution Administer 1 Drop to both eyes two (2) times a day.           _______________________________    Attestations:  Scribe Attestation     Patricia Goss acting as a scribe for and in the presence of Aide Argueta MD      January 25, 2018 at 2:20 PM       Provider Attestation:      I personally performed the services described in the documentation, reviewed the documentation, as recorded by the scribe in my presence, and it accurately and completely records my words and actions.  January 25, 2018 at 2:20 PM - Aide Argueta MD    _______________________________

## 2018-01-25 NOTE — PROGRESS NOTES
Care Management Interventions  PCP Verified by CM: Yes (Dr. Rogelio Urbano [phone])  Mode of Transport at Discharge: Other (see comment) (family)  Physical Therapy Consult: Yes  Occupational Therapy Consult: Yes  Current Support Network: Other (Lives with his partner Lesley Wolf [phone]. Son lives near by LENA  [phone])  Confirm Follow Up Transport: Family ([facility]  [phone])  Plan discussed with Pt/Family/Caregiver: Yes    This information was from the the  patient, his partner Lesley Wolf [phone], and his son LENA  [phone]. The patient is alert and is telling jokes  He and his family say he has no mobility issues. He lives in a one story home. He uses [address] [employer]. 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 On the left side he is hard of hearing and has a hearing aid. 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 He does not have good eye sight in the right eye. He has to take a lot of eye drops.

## 2018-01-25 NOTE — IP AVS SNAPSHOT
[address] Patient: Amy Berg MRN: [id number] FVW:[id number] A check janes indicates which time of day the medication should be taken. My Medications START taking these medications Instructions Each Dose to Equal  
 Morning Noon Evening Bedtime  
 amoxicillin-clavulanate 500-125 mg per tablet Commonly known as:  AUGMENTIN Your last dose was: Your next dose is: Take 1 Tab by mouth every twelve (12) hours for 5 days. 1 Tab  
    
   
   
   
  
 bisacodyl 5 mg EC tablet Commonly known as:  DULCOLAX Your last dose was: Your next dose is: Take 1 Tab by mouth daily as needed for Constipation. 5 mg  
    
   
   
   
  
 guaiFENesin 100 mg/5 mL liquid Commonly known as:  ROBITUSSIN Your last dose was: Your next dose is: Take 5 mL by mouth every four (4) hours as needed for Cough. 100 mg  
    
   
   
   
  
 lactobacillus-acidophilus 100 million cell Grpk Commonly known as:  Gaudencio Blakeslee Your last dose was: Your next dose is: Take 1 Packet by mouth two (2) times a day for 8 days. 1 Packet  
    
   
   
   
  
 senna-docusate 8.6-50 mg per tablet Commonly known as:  Sary Gleason Start taking on:  1/30/2018 Your last dose was: Your next dose is: Take 2 Tabs by mouth daily. 2 Tab  
    
   
   
   
  
 therapeutic multivitamin tablet Commonly known as:  [facility] Start taking on:  1/30/2018 Your last dose was: Your next dose is: Take 1 Tab by mouth daily. 1 Tab CONTINUE taking these medications Instructions Each Dose to Equal  
 Morning Noon Evening Bedtime  
 fentaNYL 75 mcg/hr Commonly known as:  Mamta Dennis Your last dose was: Your next dose is: Apply 1 patch every 3 days for chronic pain  
     
   
   
   
  
 gabapentin 300 mg capsule Commonly known as:  NEURONTIN Your last dose was: Your next dose is: Take 300 mg by mouth three (3) times daily. 300 mg HYDROcodone-acetaminophen  mg tablet Commonly known as:  Roya Cifuentes Your last dose was: Your next dose is: Take 1-2 tabs po daily prn chronic pain  
     
   
   
   
  
 latanoprost 0.005 % ophthalmic solution Commonly known as:  Jacqualyn Pisek Your last dose was: Your next dose is:    
   
   
 Administer 1 Drop to both eyes nightly. 1 Drop LOTEMAX 0.5 % Drpg Generic drug:  loteprednol etabonate Your last dose was: Your next dose is:    
   
   
 Administer 1 Each to both eyes daily. (1) drop in both eyes every day 1 Each PROLENSA 0.07 % ophthalmic solution Generic drug:  bromfenac Your last dose was: Your next dose is:    
   
   
 Administer 1 Drop to both eyes daily. 1 Drop REFRESH CLASSIC (PF) 1.4-0.6 % ophthalmic solution Generic drug:  polyvinyl alcohol-povidon(PF)  
   
Your last dose was: Your next dose is:    
   
   
 Administer 1-2 Drops to both eyes as needed. 1-2 Drop  
    
   
   
   
  
 timolol 0.5 % ophthalmic solution Commonly known as:  TIMOPTIC Your last dose was: Your next dose is:    
   
   
 Administer 1 Drop to both eyes two (2) times a day. 1 Drop Where to Get Your Medications Information on where to get these meds will be given to you by the nurse or doctor. ! Ask your nurse or doctor about these medications  
  amoxicillin-clavulanate 500-125 mg per tablet  
 bisacodyl 5 mg EC tablet  
 guaiFENesin 100 mg/5 mL liquid  
 lactobacillus-acidophilus 100 million cell Grpk  
 senna-docusate 8.6-50 mg per tablet therapeutic multivitamin tablet

## 2018-01-25 NOTE — Clinical Note
Status[de-identified] Inpatient [101] Type of Bed: Medical [8] Inpatient Hospitalization Certified Necessary for the Following Reasons: 3. Patient receiving treatment that can only be provided in an inpatient setting (further clarification in H&P documentation) Admitting Diagnosis: Pneumonia [807339] Admitting Physician: Sage Number [[id number]] Attending Physician: Sage Number [[id number]] Estimated Length of Stay: 3-4 Midnights Discharge Plan[de-identified] [facility]

## 2018-01-26 ENCOUNTER — APPOINTMENT (OUTPATIENT)
Dept: CT IMAGING | Age: 83
DRG: 177 | End: 2018-01-26
Attending: PHYSICIAN ASSISTANT
Payer: MEDICARE

## 2018-01-26 ENCOUNTER — APPOINTMENT (OUTPATIENT)
Dept: ULTRASOUND IMAGING | Age: 83
DRG: 177 | End: 2018-01-26
Attending: PHYSICIAN ASSISTANT
Payer: MEDICARE

## 2018-01-26 LAB
ALBUMIN SERPL-MCNC: 2.8 G/DL (ref 3.4–5)
ALBUMIN/GLOB SERPL: 0.7 {RATIO} (ref 0.8–1.7)
ALP SERPL-CCNC: 243 U/L (ref 45–117)
ALT SERPL-CCNC: 21 U/L (ref 16–61)
ANION GAP SERPL CALC-SCNC: 6 MMOL/L (ref 3–18)
APPEARANCE UR: CLEAR
AST SERPL-CCNC: 26 U/L (ref 15–37)
BASOPHILS # BLD: 0 K/UL (ref 0–0.06)
BASOPHILS NFR BLD: 0 % (ref 0–3)
BILIRUB DIRECT SERPL-MCNC: <0.1 MG/DL (ref 0–0.2)
BILIRUB SERPL-MCNC: 0.3 MG/DL (ref 0.2–1)
BILIRUB UR QL: NEGATIVE
BUN SERPL-MCNC: 19 MG/DL (ref 7–18)
BUN/CREAT SERPL: 17 (ref 12–20)
CALCIUM SERPL-MCNC: 8.3 MG/DL (ref 8.5–10.1)
CHLORIDE SERPL-SCNC: 108 MMOL/L (ref 100–108)
CO2 SERPL-SCNC: 26 MMOL/L (ref 21–32)
COLOR UR: YELLOW
CREAT SERPL-MCNC: 1.14 MG/DL (ref 0.6–1.3)
DIFFERENTIAL METHOD BLD: ABNORMAL
EOSINOPHIL # BLD: 0 K/UL (ref 0–0.4)
EOSINOPHIL NFR BLD: 1 % (ref 0–5)
ERYTHROCYTE [DISTWIDTH] IN BLOOD BY AUTOMATED COUNT: 13.5 % (ref 11.6–14.5)
GLOBULIN SER CALC-MCNC: 4.2 G/DL (ref 2–4)
GLUCOSE SERPL-MCNC: 84 MG/DL (ref 74–99)
GLUCOSE UR STRIP.AUTO-MCNC: NEGATIVE MG/DL
HCT VFR BLD AUTO: 32.7 % (ref 36–48)
HGB BLD-MCNC: 10.4 G/DL (ref 13–16)
HGB UR QL STRIP: NEGATIVE
KETONES UR QL STRIP.AUTO: 15 MG/DL
LEUKOCYTE ESTERASE UR QL STRIP.AUTO: NEGATIVE
LYMPHOCYTES # BLD: 1.9 K/UL (ref 0.8–3.5)
LYMPHOCYTES NFR BLD: 52 % (ref 20–51)
MAGNESIUM SERPL-MCNC: 1.9 MG/DL (ref 1.6–2.6)
MCH RBC QN AUTO: 26.6 PG (ref 24–34)
MCHC RBC AUTO-ENTMCNC: 31.8 G/DL (ref 31–37)
MCV RBC AUTO: 83.6 FL (ref 74–97)
MONOCYTES # BLD: 0.4 K/UL (ref 0–1)
MONOCYTES NFR BLD: 11 % (ref 2–9)
NEUTS SEG # BLD: 1.3 K/UL (ref 1.8–8)
NEUTS SEG NFR BLD: 36 % (ref 42–75)
NITRITE UR QL STRIP.AUTO: NEGATIVE
PH UR STRIP: 5 [PH] (ref 5–8)
PHOSPHATE SERPL-MCNC: 2.4 MG/DL (ref 2.5–4.9)
PLATELET # BLD AUTO: 147 K/UL (ref 135–420)
PLATELET COMMENTS,PCOM: ABNORMAL
PMV BLD AUTO: 10.9 FL (ref 9.2–11.8)
POTASSIUM SERPL-SCNC: 3.8 MMOL/L (ref 3.5–5.5)
PROT SERPL-MCNC: 7 G/DL (ref 6.4–8.2)
PROT UR STRIP-MCNC: NEGATIVE MG/DL
RBC # BLD AUTO: 3.91 M/UL (ref 4.7–5.5)
RBC MORPH BLD: ABNORMAL
SODIUM SERPL-SCNC: 140 MMOL/L (ref 136–145)
SP GR UR REFRACTOMETRY: 1.02 (ref 1–1.03)
TROPONIN I SERPL-MCNC: 0.07 NG/ML (ref 0–0.04)
UROBILINOGEN UR QL STRIP.AUTO: 0.2 EU/DL (ref 0.2–1)
WBC # BLD AUTO: 3.6 K/UL (ref 4.6–13.2)
WBC MORPH BLD: ABNORMAL

## 2018-01-26 PROCEDURE — 74011636320 HC RX REV CODE- 636/320: Performed by: HOSPITALIST

## 2018-01-26 PROCEDURE — 71275 CT ANGIOGRAPHY CHEST: CPT

## 2018-01-26 PROCEDURE — 74011250637 HC RX REV CODE- 250/637: Performed by: INTERNAL MEDICINE

## 2018-01-26 PROCEDURE — 65270000029 HC RM PRIVATE

## 2018-01-26 PROCEDURE — 94640 AIRWAY INHALATION TREATMENT: CPT

## 2018-01-26 PROCEDURE — 74011250636 HC RX REV CODE- 250/636: Performed by: INTERNAL MEDICINE

## 2018-01-26 PROCEDURE — 80048 BASIC METABOLIC PNL TOTAL CA: CPT | Performed by: HOSPITALIST

## 2018-01-26 PROCEDURE — 97161 PT EVAL LOW COMPLEX 20 MIN: CPT

## 2018-01-26 PROCEDURE — 80076 HEPATIC FUNCTION PANEL: CPT | Performed by: HOSPITALIST

## 2018-01-26 PROCEDURE — 36415 COLL VENOUS BLD VENIPUNCTURE: CPT | Performed by: INTERNAL MEDICINE

## 2018-01-26 PROCEDURE — 84100 ASSAY OF PHOSPHORUS: CPT | Performed by: HOSPITALIST

## 2018-01-26 PROCEDURE — 74011000258 HC RX REV CODE- 258: Performed by: HOSPITALIST

## 2018-01-26 PROCEDURE — 74011250636 HC RX REV CODE- 250/636: Performed by: PHYSICIAN ASSISTANT

## 2018-01-26 PROCEDURE — 92610 EVALUATE SWALLOWING FUNCTION: CPT

## 2018-01-26 PROCEDURE — 81003 URINALYSIS AUTO W/O SCOPE: CPT | Performed by: PHYSICIAN ASSISTANT

## 2018-01-26 PROCEDURE — 74011250637 HC RX REV CODE- 250/637: Performed by: HOSPITALIST

## 2018-01-26 PROCEDURE — 83735 ASSAY OF MAGNESIUM: CPT | Performed by: HOSPITALIST

## 2018-01-26 PROCEDURE — 84484 ASSAY OF TROPONIN QUANT: CPT | Performed by: INTERNAL MEDICINE

## 2018-01-26 PROCEDURE — 74011250636 HC RX REV CODE- 250/636: Performed by: HOSPITALIST

## 2018-01-26 PROCEDURE — 85025 COMPLETE CBC W/AUTO DIFF WBC: CPT | Performed by: HOSPITALIST

## 2018-01-26 PROCEDURE — 74011000250 HC RX REV CODE- 250: Performed by: INTERNAL MEDICINE

## 2018-01-26 PROCEDURE — 76705 ECHO EXAM OF ABDOMEN: CPT

## 2018-01-26 RX ORDER — SODIUM,POTASSIUM PHOSPHATES 280-250MG
1 POWDER IN PACKET (EA) ORAL 2 TIMES DAILY
Status: COMPLETED | OUTPATIENT
Start: 2018-01-26 | End: 2018-01-28

## 2018-01-26 RX ORDER — THERA TABS 400 MCG
1 TAB ORAL DAILY
Status: DISCONTINUED | OUTPATIENT
Start: 2018-01-27 | End: 2018-01-29 | Stop reason: HOSPADM

## 2018-01-26 RX ORDER — GUAIFENESIN 100 MG/5ML
100 SOLUTION ORAL
Status: DISCONTINUED | OUTPATIENT
Start: 2018-01-26 | End: 2018-01-29 | Stop reason: HOSPADM

## 2018-01-26 RX ORDER — BISACODYL 5 MG
5 TABLET, DELAYED RELEASE (ENTERIC COATED) ORAL DAILY PRN
Status: DISCONTINUED | OUTPATIENT
Start: 2018-01-26 | End: 2018-01-29 | Stop reason: HOSPADM

## 2018-01-26 RX ORDER — FACIAL-BODY WIPES
10 EACH TOPICAL
Status: ACTIVE | OUTPATIENT
Start: 2018-01-26 | End: 2018-01-27

## 2018-01-26 RX ORDER — HEPARIN SODIUM 5000 [USP'U]/ML
5000 INJECTION, SOLUTION INTRAVENOUS; SUBCUTANEOUS EVERY 8 HOURS
Status: DISCONTINUED | OUTPATIENT
Start: 2018-01-26 | End: 2018-01-29 | Stop reason: HOSPADM

## 2018-01-26 RX ADMIN — IPRATROPIUM BROMIDE AND ALBUTEROL SULFATE 3 ML: .5; 3 SOLUTION RESPIRATORY (INHALATION) at 07:19

## 2018-01-26 RX ADMIN — POTASSIUM & SODIUM PHOSPHATES POWDER PACK 280-160-250 MG 1 PACKET: 280-160-250 PACK at 16:45

## 2018-01-26 RX ADMIN — IOPAMIDOL 74 ML: 755 INJECTION, SOLUTION INTRAVENOUS at 16:24

## 2018-01-26 RX ADMIN — AMPICILLIN AND SULBACTAM 3 G: 1; 2 INJECTION, POWDER, FOR SOLUTION INTRAMUSCULAR; INTRAVENOUS at 16:50

## 2018-01-26 RX ADMIN — OSELTAMIVIR PHOSPHATE 30 MG: 30 CAPSULE ORAL at 16:45

## 2018-01-26 RX ADMIN — Medication 10 ML: at 21:15

## 2018-01-26 RX ADMIN — ASPIRIN 81 MG 81 MG: 81 TABLET ORAL at 09:02

## 2018-01-26 RX ADMIN — OSELTAMIVIR PHOSPHATE 30 MG: 30 CAPSULE ORAL at 09:02

## 2018-01-26 RX ADMIN — IPRATROPIUM BROMIDE AND ALBUTEROL SULFATE 3 ML: .5; 3 SOLUTION RESPIRATORY (INHALATION) at 21:08

## 2018-01-26 RX ADMIN — TIMOLOL MALEATE 1 DROP: 5 SOLUTION OPHTHALMIC at 00:59

## 2018-01-26 RX ADMIN — HEPARIN SODIUM 5000 UNITS: 5000 INJECTION, SOLUTION INTRAVENOUS; SUBCUTANEOUS at 21:11

## 2018-01-26 RX ADMIN — TIMOLOL MALEATE 1 DROP: 5 SOLUTION OPHTHALMIC at 21:12

## 2018-01-26 RX ADMIN — ENOXAPARIN SODIUM 80 MG: 80 INJECTION SUBCUTANEOUS at 09:02

## 2018-01-26 RX ADMIN — Medication 10 ML: at 15:06

## 2018-01-26 RX ADMIN — TIMOLOL MALEATE 1 DROP: 5 SOLUTION OPHTHALMIC at 13:21

## 2018-01-26 RX ADMIN — Medication 5 ML: at 05:18

## 2018-01-26 RX ADMIN — HEPARIN SODIUM 5000 UNITS: 5000 INJECTION, SOLUTION INTRAVENOUS; SUBCUTANEOUS at 15:06

## 2018-01-26 RX ADMIN — IPRATROPIUM BROMIDE AND ALBUTEROL SULFATE 3 ML: .5; 3 SOLUTION RESPIRATORY (INHALATION) at 11:29

## 2018-01-26 NOTE — ROUTINE PROCESS
Bedside shift change report given to Kate Viramontes RN (oncoming nurse) by Carleen Leslie (offgoing nurse). Report given with SBAR, Kardex, Intake/Output, MAR and Recent Results.

## 2018-01-26 NOTE — PROGRESS NOTES
NUTRITION    Nutrition Consult: General Nutrition Management & Supplements      RECOMMENDATIONS / PLAN:     - Add daily MVI  - Continue RD inpatient monitoring and evaluation. NUTRITION INTERVENTIONS & DIAGNOSIS:     [x] Meals/snacks: general/healthful diet; NPO  [x] Vitamin/ mineral supplement therapy: add daily MVI    Nutrition Diagnosis:  Inadequate oral intake related to poor appetite as evidenced by poor meal intake PTA     Patient meets criteria for Severe Protein Calorie Malnutrition as evidenced by:   ASPEN Malnutrition Criteria  Acute Illness, Chronic Illness, or Social/Enviornmental: Acute illness  Energy Intake: Less than/equal to 50% est energy req for greater than/equal to 5 days  Weight Loss: Greater than 7.5% x 3 mos  ASPEN Malnutrition Score - Acute Illness: 12  Acute Illness - Malnutrition Diagnosis: Severe malnutrition. ASSESSMENT:     Pt reported poor appetite and meal intake x 1 week PTA; unplanned wt loss PTA. C/o stomach pain. Pt refusing to continue nutrition visit. Refused addition of nutrition supplement. Per nursing, pt did not eat breakfast today and was NPO during lunch for ultra sound.      Average po intake adequate to meet patients estimated nutritional needs:   [] Yes     [x] No   [] Unable to determine at this time    Diet: DIET REGULAR      Food Allergies:  None known   Current Appetite:   [] Good     [] Fair     [x] Poor     [] Other:  Appetite/meal intake prior to admission:   [] Good     [] Fair     [x] Poor: x 1 week PTA     [] Other:  Feeding Limitations:  [] Swallowing difficulty    [] Chewing difficulty    [] Other:  Current Meal Intake: Patient Vitals for the past 100 hrs:   % Diet Eaten   01/26/18 0930 20 %       BM:  1/23  Skin Integrity:  No pressure injury or wound noted  Edema: none   Pertinent Medications: Reviewed    Recent Labs      01/26/18   1115  01/25/18   1222   NA  140  142   K  3.8  3.7   CL  108  108   CO2  26  26   GLU  84  99   BUN  19*  26*   CREA 1. 14  1.29   CA  8.3*  8.5   MG  1.9   --    PHOS  2.4*   --    ALB  2.8*  3.2*   SGOT  26  29   ALT  21  23       Intake/Output Summary (Last 24 hours) at 01/26/18 1552  Last data filed at 01/26/18 1530   Gross per 24 hour   Intake              490 ml   Output              975 ml   Net             -485 ml       Anthropometrics:  Ht Readings from Last 1 Encounters:   01/25/18 6' 1\" (1.854 m)     Last 3 Recorded Weights in this Encounter    01/25/18 2018   Weight: 63.4 kg (139 lb 12.8 oz)     Body mass index is 18.44 kg/(m^2). Weight History:   Pt had unplanned wt loss of 12 lb (7.9%) in past 2 months PTA per chart hx    Weight Metrics 1/25/2018 11/29/2017 8/31/2017 6/1/2017 3/1/2017 12/1/2016 9/8/2016   Weight 139 lb 12.8 oz 151 lb 147 lb 142 lb 140 lb 140 lb 140 lb 12.8 oz   BMI 18.44 kg/m2 19.92 kg/m2 19.39 kg/m2 18.73 kg/m2 18.47 kg/m2 18.47 kg/m2 18.58 kg/m2        Admitting Diagnosis: Pneumonia  Community acquired pneumonia  Pertinent PMHx: renal failure    Education Needs:        [x] None identified  [] Identified - Not appropriate at this time  []  Identified and addressed - refer to education log  Learning Limitations:   [] None identified  [x] Identified: hard of hearing    Cultural, Anglican & ethnic food preferences:  [x] None identified    [] Identified and addressed     ESTIMATED NUTRITION NEEDS:     Calories: 1015-9684 kcal (30-35 kcal/kg) based on  [x] Actual BW: 63 kg     [] IBW   Protein: 38-50 gm (0.6-0.8 gm/kg) based on  [x] Actual BW      [] IBW   Fluid: 1 mL/kcal     MONITORING & EVALUATION:     Nutrition Goal(s):   1. Po intake of meals will meet >75% of patient estimated nutritional needs within the next 7 days.   Outcome:  [] Met/Ongoing    []  Not Met    [x] New/Initial Goal     Monitoring:   [x] Food and beverage intake   [x] Diet order   [x] Nutrition-focused physical findings   [x] Treatment/therapy   [] Weight   [] Enteral nutrition intake        Previous Recommendations (for follow-up assessments only):     []   Implemented       []   Not Implemented (RD to address)      [] No Longer Appropriate     [] No Recommendation Made     Discharge Planning:  Regular diet; consistency per SLP  [x] Participated in care planning, discharge planning, & interdisciplinary rounds as appropriate      Monty England, [address] [place] [address]   Pager: [phone]

## 2018-01-26 NOTE — PROGRESS NOTES
Problem: Dysphagia (Adult)  Goal: *Acute Goals and Plan of Care (Insert Text)  Patient will:  1. Tolerate PO trials with 0 s/s overt distress in 4/5 trials  2. Utilize compensatory swallow strategies/maneuvers (decrease bite/sip, size/rate, alt. liq/sol) with min cues in 4/5 trials    Rec:     Reg solid with thin liquids  Aspiration precautions  HOB >45 during po intake, remain >30 for 30-45 minutes after po   Small bites/sips; alternate liquid/solid with slow feeding rate   Oral care TID  Meds per pt preference          Speech LAnguage Pathology bedside swallow evaluation    Patient: Alfie Tabares (51 y.o. male)  Date: 1/26/2018  Primary Diagnosis: Pneumonia  Community acquired pneumonia        Precautions: aspiration  Fall    ASSESSMENT :  Pt was seen at bedside for dysphagia evaluation with results yielding oropharyngeal swallow fxn to be essentially [facility]. Pt lethargic throughout session, requiring mod cues to attend to task. Pt tolerated reg solid, puree, and thin liquids +/- straw without any overt s/sx of aspiration and positive oral clearance. Pt with small amount of intake during evaluation secondary to c/o abdominal pain. Laryngeal elevation appeared functional to palpation. Rec reg solid diet with thin liquids, aspiration precautions, oral care TID, and meds as tolerated. Pt may require assistance with PO. ST to f/u x 1-2 more visits to ensure safety of PO. Patient will benefit from skilled intervention to address the above impairments. Patients rehabilitation potential is considered to be Good  Factors which may influence rehabilitation potential include:   [x]            None noted     PLAN :  Recommendations and Planned Interventions: See above  Frequency/Duration: Patient will be followed by speech-language pathology x 1-2 more visits to address goals.   Discharge Recommendations: [address] and To Be Determined     SUBJECTIVE:   Patient stated I just want to sleep. OBJECTIVE:     Past Medical History:   Diagnosis Date    Arthritis     Back pain, chronic      Past Surgical History:   Procedure Laterality Date    HX HEENT      relieved pressure R eye 1/2014     Prior Level of Function/Home Situation: private residence  Home Situation  Home Environment: Private residence  # Steps to Enter: 4  Rails to Enter: Yes  Hand Rails : Bilateral  One/Two Story Residence: One story  Living Alone: No  Support Systems: Family member(s), Friends \ neighbors  Patient Expects to be Discharged to[de-identified] Private residence  Current DME Used/Available at Home: Cane, straight  Diet prior to admission: suspect reg solid with thin  Current Diet:  Reg solid with thin   Cognitive and Communication Status:  Neurologic State: Alert  Orientation Level: Oriented X4  Cognition: Appropriate decision making, Appropriate for age attention/concentration, Appropriate safety awareness, Follows commands  Perception: Appears intact  Perseveration: No perseveration noted  Safety/Judgement: Awareness of environment, Fall prevention, Insight into deficits  Oral Assessment:  Oral Assessment  Labial: No impairment  Dentition: Natural;Limited  Oral Hygiene: Adequate  Lingual: No impairment  Velum: No impairment  Mandible: No impairment  P.O. Trials:  Patient Position: 55 at [facility][1]  Vocal quality prior to P.O.: No impairment  Consistency Presented: Thin liquid; Solid;Puree  How Presented: Self-fed/presented;SLP-fed/presented;Cup/sip;Spoon;Straw  Bolus Acceptance: No impairment  Bolus Formation/Control: No impairment  Propulsion: No impairment  Oral Residue: None  Initiation of Swallow: No impairment  Laryngeal Elevation: Functional  Aspiration Signs/Symptoms: None  Pharyngeal Phase Characteristics: No impairment, issues, or problems   Effective Modifications: None  Cues for Modifications: None     Oral Phase Severity: No impairment  Pharyngeal Phase Severity : No impairment    GCODESwallowing:  Swallow Current Status CI= 1-19%   Swallow Goal Status CH= 0%    The severity rating is based on the following outcomes:    Clinical Judgment    Pain:  Pt c/o 0/10 pain prior to evaluation. Pt c/o 0/10 pain post evaluation. After treatment:   []            Patient left in no apparent distress sitting up in chair  [x]            Patient left in no apparent distress in bed  [x]            Call bell left within reach  [x]            Nursing notified  []            Caregiver present  []            Bed alarm activated    COMMUNICATION/EDUCATION:   [x]            SLP educated pt with regard to compensatory swallow strategies and       aspiration/reflux precautions including: small bites/sips,       alternate liquids/solids, decrease feeding rate, HOB > 45 with all po, and         upright in bed at 30 degrees after po for at least 45 minutes. Suspect poor comp. No family at bedside. [x]            Patient/family have participated as able in goal setting and plan of care.     Thank you for this referral.    Gillian Leon M.S. CCC-SLP/L  Speech-Language Pathologist

## 2018-01-26 NOTE — CDMP QUERY
Patient is noted to have a BMI of 18.44. Please clarify:     =>Underweight  =>Mild protein calorie malnutrition  =>Cachectic  =>Failure to Thrive  =>Normal body habitus  =>Other explanation of clinical findings  =>Unable to determine (no explanation for clinical findings)    If you DECLINE this query or would like to communicate with [employer], please utilize the \"[employer] message box\" at the TOP of the Progress Note on the right.       Thank you,  Marily Paul RN/CCDS  [phone]

## 2018-01-26 NOTE — CONSULTS
[facility]. Pulmonary, Critical Care, and Sleep Medicine    Initial Patient Consult    Name: James Nichols MRN: [id number]   : 3/14/1927 Hospital: [address] [place] [address]    Date: 2018        IMPRESSION/ PLAN:   · 1. ?? Subjective Dyspnea as per chart (patient denies any breathing problems) with sats 98% On RA- Not fluid overloaded. No fever. No White count. ?? H/O aspiration as per son. Likely deconditioning, poor pO intake for 8 days. Empiric Abx for Aspiration. PRN BDs. Get SS eval. Aspiration precautions. Get resp Cx, influenza panel, procalcitonin to de-esclate or D/c Abx if < 0.05.    · 2. Asbestos pleural plaques- BENIGN, chronic. Not the cause of current symptoms. NO effusions on CXR. Concern has been raised by radiologist read about lung nodules. Get routine CT chest to evaluate that. On previous abd CT no evidence of asbestosis atleast on lower cuts of lungs    · 3. Advanced aged, very functional at baseline per Son. Full code  · 4. Poor PO intake for 8 days with abdominal pain- W/u pending per primary team. He is constipated, take opioids at home for back pain. · 5. DVT/ GI PPx. · 6. Will follow the CT. If anything unexpected on CT, will leave recommendations accordingly. Subjective:     POOR HISTORIAN/ HARD OF HEARING- HISTORY OBTAINED FROM CHART AND SON AT BEDSIDE    This patient has been seen and evaluated at the request of Dr. Magen Mike for dyspnea and finding of Asbestos pleural plaques. Patient is a 80 y.o. male who has poor po intake for 8 days. As per son he does not have fever, cough. Usually very functional at baseline but not being himself for a week or so. No h/o orthopnea/PND/Headache/confusion.  Patient denies any breathing problems      Past Medical History:   Diagnosis Date    Arthritis     Back pain, chronic       Past Surgical History:   Procedure Laterality Date    HX HEENT      relieved pressure R eye 2014      Prior to Admission medications    Medication Sig Start Date End Date Taking? Authorizing Provider   latanoprost (XALATAN) 0.005 % ophthalmic solution Administer 1 Drop to both eyes nightly. Yes Historical Provider   loteprednol etabonate (LOTEMAX) 0.5 % drpg Administer 1 Each to both eyes daily. (1) drop in both eyes every day   Yes Historical Provider   bromfenac (PROLENSA) 0.07 % ophthalmic solution Administer 1 Drop to both eyes daily. Yes Historical Provider   polyvinyl alcohol-povidon,PF, (REFRESH CLASSIC, PF,) 1.4-0.6 % ophthalmic solution Administer 1-2 Drops to both eyes as needed. Yes Historical Provider   HYDROcodone-acetaminophen (NORCO)  mg tablet Take 1-2 tabs po daily prn chronic pain 17  Yes Regan Schultz MD   fentaNYL (DURAGESIC) 75 mcg/hr Apply 1 patch every 3 days for chronic pain 17   Regan Schultz MD   timolol (TIMOPTIC) 0.5 % ophthalmic solution Administer 1 Drop to both eyes two (2) times a day. 3/9/16   Historical Provider     No Known Allergies   Social History   Substance Use Topics    Smoking status: Never Smoker    Smokeless tobacco: Never Used    Alcohol use No      Family History   Problem Relation Age of Onset    Diabetes Father     No Known Problems Mother         Review of Systems:  Pertinent items are noted in HPI. ROS    Objective:   Vital Signs:    Visit Vitals    /79 (BP 1 Location: Left arm, BP Patient Position: At rest)    Pulse 75    Temp 99.2 °F (37.3 °C)    Resp 16    Ht 6' 1\" (1.854 m)    Wt 63.4 kg (139 lb 12.8 oz)    SpO2 100%    BMI 18.44 kg/m2       O2 Device: Room air       Temp (24hrs), Av.1 °F (36.7 °C), Min:97.2 °F (36.2 °C), Max:99.2 °F (37.3 °C)       Intake/Output:   Last shift:         Last 3 shifts:  190 -  0700  In: 370 [P.O.:120;  I.V.:250]  Out: 300 [Urine:300]    Intake/Output Summary (Last 24 hours) at 18 1158  Last data filed at 18 0400   Gross per 24 hour   Intake              370 ml   Output 300 ml   Net               70 ml      Physical Exam:   General:  Alert, cooperative, no distress, appears stated age. Head:  Normocephalic, without obvious abnormality, atraumatic. Eyes:  Conjunctivae/corneas clear. ANicteric   Nose: Nares normal. Mucosa normal. No drainage or sinus tenderness. Throat: Lips, mucosa dry. NO thrush;    Neck: Supple, symmetrical, trachea midline, no adenopathy, thyroid: no enlargment/tenderness/nodules, no carotid bruit and no JVD. No crepitus   Back:   Symmetric, no curvature, no spine tenderness or flank pain   Lungs:   Bilateral auscultation CTA b/l   Chest wall:  No tenderness or deformity. NO CREPITUS   Heart:  Regular rate and rhythm, S1, S2 normal, no murmur, click, rub or gallop. Abdomen:   Soft, non-tender. Bowel sounds normal. No masses,  No organomegaly. No paradox   Extremities: normal, atraumatic, no cyanosis or edema. Pulses: 1-2+ and symmetric all extremities. Skin: Skin color, texture, turgor normal. No rashes or lesions   Lymph nodes: Cervical, supraclavicular, and axillary nodes normal.   Neurologic: Grossly nonfocal          Data review:   Labs:          Imaging:    I have personally reviewed the patients radiographs and have reviewed the reports:     High complexity decision making was performed during the evaluation of this patient at high risk for decompensation with multiple organ involvement     Above mentioned total time spent on reviewing the case/medical record/data/notes/EMR/patient examination/documentation/coordinating care with nurse/consultants, exclusive of procedures with complex decision making performed and > 50% time spent in face to face evaluation.      Addison Richardson MD

## 2018-01-26 NOTE — PROGRESS NOTES
[facility] Hospitalist Group  Progress Note    Patient: Mirian Alicea Age: 80 y.o. : 3/14/1927 MR#: [id number] SSN: [id number]  Date: 2018     Subjective:     Pt reports not eating for several days due to abd pain, and dysphagia. Pt went to PCP and was told he was dehydrated and needed to report to the hospital. Reports non-productive cough. No fever, chills, N/V/D, had BM per pt. Assessment/Plan:   1. Possible Aspiration Pneumonia with acute bronchitis - no fever, lungs sounds CTA, dc zithromax and ceftriaxone and order unasyn per pulm rec. Symptomatic mgt. Follow blood cx's. 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 3. Dysphagia: speech consult  - Reg solid with thin liquids, Aspiration precautions. 4. Elevated troponin, likely demand ischemia - flat effect, Lovenox has been discontinued. 5. Elevated lipase with abd pain - check abd US, may consider GI consult if needed after US results. Also give Dulcolax suppos now due to possible constipation while on chronic home pain meds. 6. Anorexia: for 6 days, nutrition consult. 7. CKD III: cr wnl.  8. Advance age. 9. Dyspnea on exertion only per patient: O2 sat at % at room air. May consider echo in the am  9. Goals of care: Full code, attempted to discuss code status with patient, asked to return and he is tired to talk. Will follow-up. 10. Underweight, poor PO intake: nutrition consult.      Case discussed with:  [x]Patient  [x]Family  [x]Nursing  []Case Management  DVT Prophylaxis:  []Lovenox  [x]Hep SQ  []SCDs  []Coumadin   []On Heparin gtt    Objective:   VS:   Visit Vitals    /79 (BP 1 Location: Left arm, BP Patient Position: At rest)    Pulse 75    Temp 99.2 °F (37.3 °C)    Resp 16    Ht 6' 1\" (1.854 m)    Wt 63.4 kg (139 lb 12.8 oz)    SpO2 100%    BMI 18.44 kg/m2      Tmax/24hrs: Temp (24hrs), Av.1 °F (36.7 °C), Min:97.2 °F (36.2 °C), Max:99.2 °F (37.3 °C)    Intake/Output Summary (Last 24 hours) at 01/26/18 1206  Last data filed at 01/26/18 0400   Gross per 24 hour   Intake              370 ml   Output              300 ml   Net               70 ml       General: Awake, alert, NAD   Cardiovascular:  RRR  Pulmonary:  CTA  GI:  Epigastric tenderness, normal BS  Extremities:  No edema or cyanosis      Labs:    Recent Results (from the past 24 hour(s))   METABOLIC PANEL, COMPREHENSIVE    Collection Time: 01/25/18 12:22 PM   Result Value Ref Range    Sodium 142 136 - 145 mmol/L    Potassium 3.7 3.5 - 5.5 mmol/L    Chloride 108 100 - 108 mmol/L    CO2 26 21 - 32 mmol/L    Anion gap 8 3.0 - 18 mmol/L    Glucose 99 74 - 99 mg/dL    BUN 26 (H) 7.0 - 18 MG/DL    Creatinine 1.29 0.6 - 1.3 MG/DL    BUN/Creatinine ratio 20 12 - 20      GFR est AA >60 >60 ml/min/1.73m2    GFR est non-AA 52 (L) >60 ml/min/1.73m2    Calcium 8.5 8.5 - 10.1 MG/DL    Bilirubin, total 0.4 0.2 - 1.0 MG/DL    ALT (SGPT) 23 16 - 61 U/L    AST (SGOT) 29 15 - 37 U/L    Alk. phosphatase 263 (H) 45 - 117 U/L    Protein, total 7.7 6.4 - 8.2 g/dL    Albumin 3.2 (L) 3.4 - 5.0 g/dL    Globulin 4.5 (H) 2.0 - 4.0 g/dL    A-G Ratio 0.7 (L) 0.8 - 1.7     LIPASE    Collection Time: 01/25/18 12:22 PM   Result Value Ref Range    Lipase 451 (H) 73 - 393 U/L   CBC WITH AUTOMATED DIFF    Collection Time: 01/25/18 12:22 PM   Result Value Ref Range    WBC 4.3 (L) 4.6 - 13.2 K/uL    RBC 4.28 (L) 4.70 - 5.50 M/uL    HGB 11.5 (L) 13.0 - 16.0 g/dL    HCT 35.8 (L) 36.0 - 48.0 %    MCV 83.6 74.0 - 97.0 FL    MCH 26.9 24.0 - 34.0 PG    MCHC 32.1 31.0 - 37.0 g/dL    RDW 13.5 11.6 - 14.5 %    PLATELET 603 693 - 244 K/uL    MPV 11.4 9.2 - 11.8 FL    NEUTROPHILS 51 40 - 73 %    LYMPHOCYTES 32 21 - 52 %    MONOCYTES 14 (H) 3 - 10 %    EOSINOPHILS 2 0 - 5 %    BASOPHILS 1 0 - 2 %    ABS. NEUTROPHILS 2.2 1.8 - 8.0 K/UL    ABS. LYMPHOCYTES 1.4 0.9 - 3.6 K/UL    ABS. MONOCYTES 0.6 0.05 - 1.2 K/UL    ABS.  EOSINOPHILS 0.1 0.0 - 0.4 K/UL    ABS.  BASOPHILS 0.0 0.0 - 0.1 K/UL    DF AUTOMATED     CARDIAC PANEL,(CK, CKMB & TROPONIN)    Collection Time: 01/25/18 12:22 PM   Result Value Ref Range    CK 46 39 - 308 U/L    CK - MB <1.0 <3.6 ng/ml    CK-MB Index  0.0 - 4.0 %     CALCULATION NOT PERFORMED WHEN RESULT IS BELOW LINEAR LIMIT    Troponin-I, Qt. 0.07 (H) 0.0 - 0.045 NG/ML   INFLUENZA A & B AG (RAPID TEST)    Collection Time: 01/25/18 12:50 PM   Result Value Ref Range    Influenza A Antigen NEGATIVE  NEG      Influenza B Antigen NEGATIVE  NEG     POC LACTIC ACID    Collection Time: 01/25/18 12:58 PM   Result Value Ref Range    Lactic Acid (POC) 1.1 0.4 - 2.0 mmol/L   CULTURE, BLOOD    Collection Time: 01/25/18  2:55 PM   Result Value Ref Range    Special Requests: NO SPECIAL REQUESTS      Culture result: NO GROWTH AFTER 14 HOURS     CULTURE, BLOOD    Collection Time: 01/25/18  3:10 PM   Result Value Ref Range    Special Requests: NO SPECIAL REQUESTS      Culture result: NO GROWTH AFTER 14 HOURS     LIPID PANEL    Collection Time: 01/25/18  8:54 PM   Result Value Ref Range    LIPID PROFILE          Cholesterol, total 90 <200 MG/DL    Triglyceride 107 <150 MG/DL    HDL Cholesterol 22 (L) 40 - 60 MG/DL    LDL, calculated 46.6 0 - 100 MG/DL    VLDL, calculated 21.4 MG/DL    CHOL/HDL Ratio 4.1 0 - 5.0     HEMOGLOBIN A1C WITH EAG    Collection Time: 01/25/18  8:54 PM   Result Value Ref Range    Hemoglobin A1c 5.7 (H) 4.2 - 5.6 %    Est. average glucose 117 mg/dL   TROPONIN I    Collection Time: 01/25/18 11:51 PM   Result Value Ref Range    Troponin-I, Qt. 0.07 (H) 0.0 - 0.045 NG/ML   URINALYSIS W/ RFLX MICROSCOPIC    Collection Time: 01/26/18  3:30 AM   Result Value Ref Range    Color YELLOW      Appearance CLEAR      Specific gravity 1.023 1.005 - 1.030      pH (UA) 5.0 5.0 - 8.0      Protein NEGATIVE  NEG mg/dL    Glucose NEGATIVE  NEG mg/dL    Ketone 15 (A) NEG mg/dL    Bilirubin NEGATIVE  NEG      Blood NEGATIVE  NEG      Urobilinogen 0.2 0.2 - 1.0 EU/dL    Nitrites NEGATIVE  NEG      Leukocyte Esterase NEGATIVE  NEG     TROPONIN I    Collection Time: 01/26/18  5:41 AM   Result Value Ref Range    Troponin-I, Qt. 0.07 (H) 0.0 - 0.045 NG/ML   CBC WITH AUTOMATED DIFF    Collection Time: 01/26/18 11:15 AM   Result Value Ref Range    WBC 3.6 (L) 4.6 - 13.2 K/uL    RBC 3.91 (L) 4.70 - 5.50 M/uL    HGB 10.4 (L) 13.0 - 16.0 g/dL    HCT 32.7 (L) 36.0 - 48.0 %    MCV 83.6 74.0 - 97.0 FL    MCH 26.6 24.0 - 34.0 PG    MCHC 31.8 31.0 - 37.0 g/dL    RDW 13.5 11.6 - 14.5 %    PLATELET 057 099 - 497 K/uL    MPV 10.9 9.2 - 11.8 FL    NEUTROPHILS PENDING %    LYMPHOCYTES PENDING %    MONOCYTES PENDING %    EOSINOPHILS PENDING %    BASOPHILS PENDING %    ABS. NEUTROPHILS PENDING K/UL    ABS. LYMPHOCYTES PENDING K/UL    ABS. MONOCYTES PENDING K/UL    ABS. EOSINOPHILS PENDING K/UL    ABS. BASOPHILS PENDING K/UL    DF PENDING    MAGNESIUM    Collection Time: 01/26/18 11:15 AM   Result Value Ref Range    Magnesium 1.9 1.6 - 2.6 mg/dL   METABOLIC PANEL, BASIC    Collection Time: 01/26/18 11:15 AM   Result Value Ref Range    Sodium 140 136 - 145 mmol/L    Potassium 3.8 3.5 - 5.5 mmol/L    Chloride 108 100 - 108 mmol/L    CO2 26 21 - 32 mmol/L    Anion gap 6 3.0 - 18 mmol/L    Glucose 84 74 - 99 mg/dL    BUN 19 (H) 7.0 - 18 MG/DL    Creatinine 1.14 0.6 - 1.3 MG/DL    BUN/Creatinine ratio 17 12 - 20      GFR est AA >60 >60 ml/min/1.73m2    GFR est non-AA >60 >60 ml/min/1.73m2    Calcium 8.3 (L) 8.5 - 10.1 MG/DL   PHOSPHORUS    Collection Time: 01/26/18 11:15 AM   Result Value Ref Range    Phosphorus 2.4 (L) 2.5 - 4.9 MG/DL   HEPATIC FUNCTION PANEL    Collection Time: 01/26/18 11:15 AM   Result Value Ref Range    Protein, total 7.0 6.4 - 8.2 g/dL    Albumin 2.8 (L) 3.4 - 5.0 g/dL    Globulin 4.2 (H) 2.0 - 4.0 g/dL    A-G Ratio 0.7 (L) 0.8 - 1.7      Bilirubin, total 0.3 0.2 - 1.0 MG/DL    Bilirubin, direct <0.1 0.0 - 0.2 MG/DL    Alk.  phosphatase 243 (H) 45 - 117 U/L    AST (SGOT) 26 15 - 37 U/L    ALT (SGPT) 21 16 - 61 U/L   TROPONIN I    Collection Time: 01/26/18 11:15 AM   Result Value Ref Range    Troponin-I, Qt. 0.07 (H) 0.0 - 0.045 NG/ML       Signed By: Elly Vines PA-C     January 26, 2018 12:06 PM

## 2018-01-26 NOTE — PROGRESS NOTES
Problem: Mobility Impaired (Adult and Pediatric)  Goal: *Acute Goals and Plan of Care (Insert Text)  Acute goals not established. Patient reports/demonstrates independent functional mobility, acute skilled PT services not indicated at this time. physical Therapy EVALUATION and Discharge    Patient: Flor Heck (16 y.o. male)  Date: 1/26/2018  Primary Diagnosis: Pneumonia  Community acquired pneumonia  Precautions: Fall    ASSESSMENT AND RECOMMENDATIONS:  Based on the objective data described below, the patient presents with baseline functional mobility including bed mobility, transfers, ambulation, and general activity tolerance following admission for pneumonia. Patient presents today semi-reclined in bed, alert and agreeable to PT evaluation. 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 He transferred to sitting EOB with [facility], to standing with [facility] and supervision for safety. Patient ambulated ~50 ft in room with good balance and safety awareness, did not require cues for environmental awareness. He returned to bed, was left with call bell in reach and nurse notified. Skilled physical therapy is not indicated at this time. Discharge Recommendations: Home Health  Further Equipment Recommendations for Discharge: N/A      SUBJECTIVE:   Patient stated I don't want to get up, my stomach hurts.     OBJECTIVE DATA SUMMARY:     Past Medical History:   Diagnosis Date    Arthritis     Back pain, chronic      Past Surgical History:   Procedure Laterality Date    HX HEENT      relieved pressure R eye 1/2014     Barriers to Learning/Limitations: yes;  sensory deficits-vision/hearing  Compensate with: Visual Cues, Verbal Cues and Tactile Cues  Prior Level of Function/Home Situation: Patient lives with friend and nephew in single story home, has assistance available 24/7. He was ambulating with [facility] prior to admission.   Home Situation  Home Environment: Private residence  # Steps to Enter: 4  Rails to Enter: Yes  Hand Rails : Bilateral  One/Two Story Residence: One story  Living Alone: No  Support Systems: Family member(s), Friends \ neighbors  Patient Expects to be Discharged to[de-identified] Private residence  Current DME Used/Available at Home: [address] [facility] [place], straight  Critical Behavior:  Neurologic State: Alert  Psychosocial  Patient Behaviors: Calm; Cooperative  Purposeful Interaction: Yes  Pt Identified Daily Priority: Clinical issues (comment)  Caritas Process: Nurture loving kindness;Nurture spiritual self;Establish trust;Create healing environment  Caring Interventions: Reassure  Reassure: Therapeutic listening;Caring rounds  Strength:    Strength: Within functional limits (BLE)  Tone & Sensation:   Tone: Normal  Sensation: Intact (BLE)   Range Of Motion:  AROM: Within functional limits (BLE)  Functional Mobility:  Bed Mobility:  Rolling: Modified independent  Supine to Sit: Modified independent  Sit to Supine: Modified independent  Scooting: Modified independent  Transfers:  Sit to Stand: Supervision  Stand to Sit: Supervision  Balance:   Sitting: Intact  Standing: Intact; With support [facility])  Ambulation/Gait Training:  Distance (ft): 50 Feet (ft)  Assistive Device: Cane, straight  Ambulation - Level of Assistance: Supervision  Base of Support: Narrowed  Speed/Paola: Pace decreased (<100 feet/min)  Pain:  Pain Scale 1: Numeric (0 - 10)  Pain Intensity 1: 0  Activity Tolerance:   Good  Please refer to the flowsheet for vital signs taken during this treatment.   After treatment:   [] Patient left in no apparent distress sitting up in chair  [] Patient left sitting on EOB  [x] Patient left in no apparent distress in bed  [] Patient declined to be OOB at this time due to  [x] Call bell left within reach  [x] Nursing notified Chayo Gates)  [] Caregiver present  [] Bed alarm activated  COMMUNICATION/EDUCATION:   [x]         Fall prevention education was provided and the patient/caregiver indicated understanding. [x]         Patient/family have participated as able in goal setting and plan of care. [x]         Patient/family agree to work toward stated goals and plan of care. []         Patient understands intent and goals of therapy, but is neutral about his/her participation. []         Patient is unable to participate in goal setting and plan of care. Thank you for this referral.  Sarai Carlos   Time Calculation: 11 mins    Mobility  Current  CI= 1-19%   Goal  CI= 1-19%  D/C  CI= 1-19%. The severity rating is based on the Level of Assistance required for Functional Mobility and ADLs.     Eval Complexity: History: MEDIUM  Complexity : 1-2 comorbidities / personal factors will impact the outcome/ POC Exam:LOW Complexity : 1-2 Standardized tests and measures addressing body structure, function, activity limitation and / or participation in recreation  Presentation: LOW Complexity : Stable, uncomplicated Overall Complexity:LOW

## 2018-01-27 LAB
ANION GAP SERPL CALC-SCNC: 7 MMOL/L (ref 3–18)
BASOPHILS # BLD: 0 K/UL (ref 0–0.06)
BASOPHILS NFR BLD: 0 % (ref 0–3)
BUN SERPL-MCNC: 16 MG/DL (ref 7–18)
BUN/CREAT SERPL: 17 (ref 12–20)
CALCIUM SERPL-MCNC: 8.4 MG/DL (ref 8.5–10.1)
CHLORIDE SERPL-SCNC: 108 MMOL/L (ref 100–108)
CO2 SERPL-SCNC: 26 MMOL/L (ref 21–32)
CREAT SERPL-MCNC: 0.96 MG/DL (ref 0.6–1.3)
DIFFERENTIAL METHOD BLD: ABNORMAL
EOSINOPHIL # BLD: 0.1 K/UL (ref 0–0.4)
EOSINOPHIL NFR BLD: 2 % (ref 0–5)
ERYTHROCYTE [DISTWIDTH] IN BLOOD BY AUTOMATED COUNT: 13.5 % (ref 11.6–14.5)
GLUCOSE SERPL-MCNC: 77 MG/DL (ref 74–99)
HCT VFR BLD AUTO: 33 % (ref 36–48)
HGB BLD-MCNC: 10.4 G/DL (ref 13–16)
LYMPHOCYTES # BLD: 2.4 K/UL (ref 0.8–3.5)
LYMPHOCYTES NFR BLD: 66 % (ref 20–51)
MCH RBC QN AUTO: 26.4 PG (ref 24–34)
MCHC RBC AUTO-ENTMCNC: 31.5 G/DL (ref 31–37)
MCV RBC AUTO: 83.8 FL (ref 74–97)
MONOCYTES # BLD: 0.4 K/UL (ref 0–1)
MONOCYTES NFR BLD: 12 % (ref 2–9)
NEUTS SEG # BLD: 0.6 K/UL (ref 1.8–8)
NEUTS SEG NFR BLD: 17 % (ref 42–75)
OTHER CELLS NFR BLD MANUAL: 3 %
PLATELET # BLD AUTO: 160 K/UL (ref 135–420)
PLATELET COMMENTS,PCOM: ABNORMAL
PMV BLD AUTO: 11.6 FL (ref 9.2–11.8)
POTASSIUM SERPL-SCNC: 3.7 MMOL/L (ref 3.5–5.5)
RBC # BLD AUTO: 3.94 M/UL (ref 4.7–5.5)
RBC MORPH BLD: ABNORMAL
SODIUM SERPL-SCNC: 141 MMOL/L (ref 136–145)
WBC # BLD AUTO: 3.6 K/UL (ref 4.6–13.2)

## 2018-01-27 PROCEDURE — 80048 BASIC METABOLIC PNL TOTAL CA: CPT | Performed by: PHYSICIAN ASSISTANT

## 2018-01-27 PROCEDURE — 74011250637 HC RX REV CODE- 250/637: Performed by: PHYSICIAN ASSISTANT

## 2018-01-27 PROCEDURE — 74011250636 HC RX REV CODE- 250/636: Performed by: PHYSICIAN ASSISTANT

## 2018-01-27 PROCEDURE — 94640 AIRWAY INHALATION TREATMENT: CPT

## 2018-01-27 PROCEDURE — 74011000258 HC RX REV CODE- 258: Performed by: HOSPITALIST

## 2018-01-27 PROCEDURE — 87040 BLOOD CULTURE FOR BACTERIA: CPT | Performed by: HOSPITALIST

## 2018-01-27 PROCEDURE — 94668 MNPJ CHEST WALL SBSQ: CPT

## 2018-01-27 PROCEDURE — 74011250636 HC RX REV CODE- 250/636: Performed by: HOSPITALIST

## 2018-01-27 PROCEDURE — 74011250637 HC RX REV CODE- 250/637: Performed by: HOSPITALIST

## 2018-01-27 PROCEDURE — 74011000250 HC RX REV CODE- 250: Performed by: INTERNAL MEDICINE

## 2018-01-27 PROCEDURE — 85025 COMPLETE CBC W/AUTO DIFF WBC: CPT | Performed by: PHYSICIAN ASSISTANT

## 2018-01-27 PROCEDURE — 97165 OT EVAL LOW COMPLEX 30 MIN: CPT

## 2018-01-27 PROCEDURE — 65270000029 HC RM PRIVATE

## 2018-01-27 PROCEDURE — 74011250637 HC RX REV CODE- 250/637: Performed by: INTERNAL MEDICINE

## 2018-01-27 PROCEDURE — 36415 COLL VENOUS BLD VENIPUNCTURE: CPT | Performed by: PHYSICIAN ASSISTANT

## 2018-01-27 RX ORDER — GABAPENTIN 300 MG/1
300 CAPSULE ORAL 3 TIMES DAILY
COMMUNITY
End: 2018-01-31

## 2018-01-27 RX ADMIN — HEPARIN SODIUM 5000 UNITS: 5000 INJECTION, SOLUTION INTRAVENOUS; SUBCUTANEOUS at 20:54

## 2018-01-27 RX ADMIN — POTASSIUM & SODIUM PHOSPHATES POWDER PACK 280-160-250 MG 1 PACKET: 280-160-250 PACK at 17:36

## 2018-01-27 RX ADMIN — POTASSIUM & SODIUM PHOSPHATES POWDER PACK 280-160-250 MG 1 PACKET: 280-160-250 PACK at 08:59

## 2018-01-27 RX ADMIN — HEPARIN SODIUM 5000 UNITS: 5000 INJECTION, SOLUTION INTRAVENOUS; SUBCUTANEOUS at 12:32

## 2018-01-27 RX ADMIN — OSELTAMIVIR PHOSPHATE 30 MG: 30 CAPSULE ORAL at 08:59

## 2018-01-27 RX ADMIN — IPRATROPIUM BROMIDE AND ALBUTEROL SULFATE 3 ML: .5; 3 SOLUTION RESPIRATORY (INHALATION) at 20:39

## 2018-01-27 RX ADMIN — THERA TABS 1 TABLET: TAB at 09:02

## 2018-01-27 RX ADMIN — AMPICILLIN AND SULBACTAM 3 G: 1; 2 INJECTION, POWDER, FOR SOLUTION INTRAMUSCULAR; INTRAVENOUS at 17:36

## 2018-01-27 RX ADMIN — ASPIRIN 81 MG 81 MG: 81 TABLET ORAL at 08:59

## 2018-01-27 RX ADMIN — TIMOLOL MALEATE 1 DROP: 5 SOLUTION OPHTHALMIC at 20:55

## 2018-01-27 RX ADMIN — Medication 10 ML: at 05:46

## 2018-01-27 RX ADMIN — HEPARIN SODIUM 5000 UNITS: 5000 INJECTION, SOLUTION INTRAVENOUS; SUBCUTANEOUS at 04:18

## 2018-01-27 RX ADMIN — TIMOLOL MALEATE 1 DROP: 5 SOLUTION OPHTHALMIC at 09:06

## 2018-01-27 RX ADMIN — Medication 10 ML: at 21:00

## 2018-01-27 RX ADMIN — AMPICILLIN AND SULBACTAM 3 G: 1; 2 INJECTION, POWDER, FOR SOLUTION INTRAMUSCULAR; INTRAVENOUS at 04:23

## 2018-01-27 RX ADMIN — Medication 10 ML: at 17:38

## 2018-01-27 RX ADMIN — IPRATROPIUM BROMIDE AND ALBUTEROL SULFATE 3 ML: .5; 3 SOLUTION RESPIRATORY (INHALATION) at 13:10

## 2018-01-27 NOTE — PROGRESS NOTES
Problem: Self Care Deficits Care Plan (Adult)  Goal: *Acute Goals and Plan of Care (Insert Text)  Occupational Therapy EVALUATION/discharge    Patient: Denise Jeans (23 y.o. male)  Date: 1/27/2018  Primary Diagnosis: Pneumonia  Community acquired pneumonia  Precautions:  Fall    ASSESSMENT AND RECOMMENDATIONS:  Based on the objective data described below, the patient presents without functional deficits. He and his family report that he is at his baseline level of function and skilled occupational therapy is not indicated at this time. Discharge Recommendations: None  Further Equipment Recommendations for Discharge: N/A      Barriers to Learning/Limitations: None    COMPLEXITY     Eval Complexity: History: LOW Complexity : Brief history review ; Examination: LOW Complexity : 1-3 performance deficits relating to physical, cognitive , or psychosocial skils that result in activity limitations and / or participation restrictions ; Decision Making:LOW Complexity : No comorbidities that affect functional and no verbal or physical assistance needed to complete eval tasks  Assessment: LOW Complexity        G-CODES:     Self Care  Current  CI= 1-19%   Goal  CI= 1-19%   D/C  CI= 1-19%. The severity rating is based on the Level of Assistance required for Functional Mobility and ADLs. SUBJECTIVE:   Patient stated Lynne Murphy will have no problems taking care of myself at home.     OBJECTIVE DATA SUMMARY:     Past Medical History:   Diagnosis Date    Arthritis     Back pain, chronic      Past Surgical History:   Procedure Laterality Date    HX HEENT      relieved pressure R eye 1/2014     Prior Level of Function/Home Situation: he has 24/7 level of assistance at home  [address]: Private residence  # Steps to Enter: 4  Rails to Enter: Yes  Hand Rails : Bilateral  One/Two Story Residence: One story  Living Alone: No  Support Systems: Family member(s)  Patient Expects to be Discharged to[de-identified] Private residence  Current DME Used/Available at Home: Cane, straight  [x]     Right hand dominant   []     Left hand dominant  Cognitive/Behavioral Status:   he is alert, oriented X 3 (orientation to situation unclear)   Skin: no skin integrity issues noted during OT evaluation  Edema: no extremity edema is noted  Vision/Perceptual:      blind in right eye; left eye appears WFL     Coordination:   BUE's WFL    Balance:   WFL in sitting and standing  Strength:  BUE's: 4/5   Tone & Sensation:  BUE's WFL   Range of Motion:  BUE's AROM is WFL   Functional Mobility and Transfers for ADLs:  Bed Mobility:   supine to sit and return from supine is modified independent     ADL Assessment:   self feeding:  Independent (simulated)  Grooming:  Independent (simulated)  UB bathing/dressing: independent (simulated)  LB bathing/dressing: independent (simulated)   Pain:  Pt reports 0/10 pain or discomfort prior to treatment.    Pt reports 0/10 pain or discomfort post treatment. Activity Tolerance:   No SOB noted and no c/o fatigue  Please refer to the flowsheet for vital signs taken during this treatment. After treatment:   []  Patient left in no apparent distress sitting up in chair  [x]  Patient left in no apparent distress in bed  [x]  Call bell left within reach  []  Nursing notified  [x]  2 family members present  []  Bed alarm activated    COMMUNICATION/EDUCATION:   Communication/Collaboration:  []      Home safety education was provided and the patient/caregiver indicated understanding. [x]      Patient/family have participated as able and agree with findings and recommendations. []      Patient is unable to participate in plan of care at this time.     Pollo Alvarado OTR/L  Time Calculation: 10 mins

## 2018-01-27 NOTE — PROGRESS NOTES
Problem: Falls - Risk of  Goal: *Absence of Falls  Document Wagner Fall Risk and appropriate interventions in the flowsheet.    Outcome: Progressing Towards Goal  Fall Risk Interventions:  Mobility Interventions: Patient to call before getting OOB         Medication Interventions: Patient to call before getting OOB    Elimination Interventions: Call light in reach, Patient to call for help with toileting needs

## 2018-01-27 NOTE — PROGRESS NOTES
[facility] Hospitalist Group  Progress Note    Patient: Shanthi Lynn Age: 80 y.o. : 3/14/1927 MR#: [id number] SSN: [id number]  Date: 2018     Subjective:     Pt reports some improvement in abd pain, and tolerated his diet. Denies any chest pain, SOB, N/V/D/C. Had BM yesterday. Spoke with pt and Son- Indu at bedside. Per pt, POA passed away a while ago, however, son - Indu at bedside thinks he may be new POA but AMD may have , and form is in [place], [place]. Patient insists POA  when prompted directly. Pt is AAOx3, engaging in conversations.  referral to help with AMD.     Assessment/Plan:   1. Possible Aspiration Pneumonia with acute bronchitis - no fever, lungs sounds CTA, zithromax and ceftriaxone was dc'd and order unasyn per pulm rec. Cont. Unasyn. Symptomatic mgt. Follow blood cx's. CTA with no PE. 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 3. Dysphagia: speech consult appreciated, pt did well  - rec Reg solid with thin liquids, Aspiration precautions. 4. Elevated troponin, likely demand ischemia - flat effect, Lovenox was discontinued. 5. Elevated lipase with abd pain - abd US with no acute concerns, abd pain improved per pt, tolerating diet. Cont. Bowel regimen  6. Anorexia:  nutrition consulted. 7. CKD III: cr wnl.  8. Advance age. 9. Dyspnea on exertion only per patient: O2 sat at % at room air. Check echo in the am  9. Goals of care: will like Full code per pt.  referral to help with AMD.  10. Underweight, poor PO intake: nutrition consult. 11. Bacteriemia: 1/2 BOTTLE GRAM POSITIVE COCCI IN GROUPS, cont. unasyn and follow sensitivity, follow repeat blood cx, ID consult to follow. 12. Severe Protein Calorie Malnutrition: nutrition consult, cont. Supplement, on multivit. 13. Underweight Body mass index is 18.44 kg/(m^2). 14. Disposition: no discharge rec per PT/OT.  Pt to return home. Case discussed with:  [x]Patient  [x]Family  [x]Nursing  []Case Management  DVT Prophylaxis:  []Lovenox  [x]Hep SQ  []SCDs  []Coumadin   []On Heparin gtt    Objective:   VS:   Visit Vitals    /66 (BP 1 Location: Left arm, BP Patient Position: Head of bed elevated (Comment degrees))    Pulse 70    Temp 97.6 °F (36.4 °C)    Resp 18    Ht 6' 1\" (1.854 m)    Wt 63.4 kg (139 lb 12.8 oz)    SpO2 98%    BMI 18.44 kg/m2      Tmax/24hrs: Temp (24hrs), Av.6 °F (36.4 °C), Min:97.4 °F (36.3 °C), Max:97.8 °F (36.6 °C)    Intake/Output Summary (Last 24 hours) at 18 1526  Last data filed at 18 2025   Gross per 24 hour   Intake              474 ml   Output             1175 ml   Net             -701 ml       General: Awake, alert, NAD, AAOX3  Cardiovascular:  RRR  Pulmonary:  CTA  GI:  Epigastric tenderness, normal BS  Extremities:  No edema or cyanosis    Labs:    Recent Results (from the past 24 hour(s))   CBC WITH AUTOMATED DIFF    Collection Time: 18  4:10 AM   Result Value Ref Range    WBC 3.6 (L) 4.6 - 13.2 K/uL    RBC 3.94 (L) 4.70 - 5.50 M/uL    HGB 10.4 (L) 13.0 - 16.0 g/dL    HCT 33.0 (L) 36.0 - 48.0 %    MCV 83.8 74.0 - 97.0 FL    MCH 26.4 24.0 - 34.0 PG    MCHC 31.5 31.0 - 37.0 g/dL    RDW 13.5 11.6 - 14.5 %    PLATELET 409 097 - 230 K/uL    MPV 11.6 9.2 - 11.8 FL    NEUTROPHILS 17 (L) 42 - 75 %    LYMPHOCYTES 66 (H) 20 - 51 %    MONOCYTES 12 (H) 2 - 9 %    EOSINOPHILS 2 0 - 5 %    BASOPHILS 0 0 - 3 %    OTHER CELL 3 (H) 0      ABS. NEUTROPHILS 0.6 (L) 1.8 - 8.0 K/UL    ABS. LYMPHOCYTES 2.4 0.8 - 3.5 K/UL    ABS. MONOCYTES 0.4 0 - 1.0 K/UL    ABS. EOSINOPHILS 0.1 0.0 - 0.4 K/UL    ABS.  BASOPHILS 0.0 0.0 - 0.06 K/UL    DF MANUAL      PLATELET COMMENTS ADEQUATE PLATELETS      RBC COMMENTS NORMOCYTIC, NORMOCHROMIC     METABOLIC PANEL, BASIC    Collection Time: 18  4:10 AM   Result Value Ref Range    Sodium 141 136 - 145 mmol/L    Potassium 3.7 3.5 - 5.5 mmol/L Chloride 108 100 - 108 mmol/L    CO2 26 21 - 32 mmol/L    Anion gap 7 3.0 - 18 mmol/L    Glucose 77 74 - 99 mg/dL    BUN 16 7.0 - 18 MG/DL    Creatinine 0.96 0.6 - 1.3 MG/DL    BUN/Creatinine ratio 17 12 - 20      GFR est AA >60 >60 ml/min/1.73m2    GFR est non-AA >60 >60 ml/min/1.73m2    Calcium 8.4 (L) 8.5 - 10.1 MG/DL       Signed By: Shaggy Hamm PA-C     January 27, 2018 3:26 PM

## 2018-01-27 NOTE — PROGRESS NOTES
Bedside and Verbal shift change report given to Azucena VIGIL (oncoming nurse) by Mary Haley LPN (offgoing nurse). Report included the following information SBAR, Kardex, MAR and Recent Results.     SITUATION:    Code Status: Full Code   Reason for Admission: Pneumonia   Community acquired pneumonia    [facility] day: 2   Problem List:       Hospital Problems  Date Reviewed: 8/31/2017          Codes Class Noted POA    Pneumonia ICD-10-CM: J18.9  ICD-9-CM: 530  1/25/2018 Unknown        Community acquired pneumonia ICD-10-CM: J18.9  ICD-9-CM: 025  1/25/2018 Unknown              BACKGROUND:    Past Medical History:   Past Medical History:   Diagnosis Date    Arthritis     Back pain, chronic          Patient taking anticoagulants yes     ASSESSMENT:    Changes in Assessment Throughout Shift: No     Patient has Central Line: no Patient has Oliver Cath: no     Last Vitals:     Vitals:    01/27/18 0759 01/27/18 1159 01/27/18 1312 01/27/18 1627   BP: 120/78 111/66  110/74   Pulse: 70 70  72   Resp: 16 18 18   Temp: 97.4 °F (36.3 °C) 97.6 °F (36.4 °C)  97 °F (36.1 °C)   SpO2: 98% 98% 98% 98%   Weight:       Height:            IV and DRAINS (will only show if present)   [REMOVED] Peripheral IV 01/25/18 Left Wrist-Site Assessment: Clean, dry, & intact  Peripheral IV 01/25/18 Right Forearm-Site Assessment: Clean, dry, & intact     WOUND (if present)   Wound Type:  none   Dressing present Dressing Present : No   Wound Concerns/Notes:  none     PAIN    Pain Assessment    Pain Intensity 1: 0 (01/27/18 1200)              Patient Stated Pain Goal: 0  o Interventions for Pain:  Tylenol  o Intervention effective: yes  o Time of last intervention: No pain med given   o Reassessment Completed: yes      Last 3 Weights:  Last 3 Recorded Weights in this Encounter    01/25/18 2018   Weight: 63.4 kg (139 lb 12.8 oz)     Weight change:      INTAKE/OUPUT    Current Shift:      Last three shifts: 01/25 1901 - 01/27 0700  In: 964 [P.O.:714; I.V.:250]  Out: 1475 [Urine:1475]     LAB RESULTS     Recent Labs      01/27/18   0410  01/26/18   1115  01/25/18   1222   WBC  3.6*  3.6*  4.3*   HGB  10.4*  10.4*  11.5*   HCT  33.0*  32.7*  35.8*   PLT  160  147  147        Recent Labs      01/27/18   0410  01/26/18   1115  01/25/18   1222   NA  141  140  142   K  3.7  3.8  3.7   GLU  77  84  99   BUN  16  19*  26*   CREA  0.96  1.14  1.29   CA  8.4*  8.3*  8.5   MG   --   1.9   --        RECOMMENDATIONS AND DISCHARGE PLANNING     1. Pending tests/procedures/ Plan of Care or Other Needs: 2 D Echo     2. Discharge plan for patient and Needs/Barriers: Home    3. Estimated Discharge Date: 1/30/2018 Posted on Whiteboard in [address] [place] [address] Room: yes      4. The patient's care plan was reviewed with the oncoming nurse. \"HEALS\" SAFETY CHECK      Fall Risk    Total Score: 3    Safety Measures: Safety Measures: Bed/Chair-Wheels locked, Bed in low position, Call light within reach, Fall prevention (comment), Gripper socks, Side rails X 3    A safety check occurred in the patient's room between off going nurse and oncoming nurse listed above. The safety check included the below items  Area Items   H  High Alert Medications - Verify all high alert medication drips (heparin, PCA, etc.)   E  Equipment - Suction is set up for ALL patients (with yanker)  - Red plugs utilized for all equipment (IV pumps, etc.)  - WOWs wiped down at end of shift.  - Room stocked with oxygen, suction, and other unit-specific supplies   A  Alarms - Bed alarm is set for fall risk patients  - Ensure chair alarm is in place and activated if patient is up in a chair   L  Lines - Check IV for any infiltration  - Oliver bag is empty if patient has a Oliver   - Tubing and IV bags are labeled   S  Safety   - Room is clean, patient is clean, and equipment is clean. - Hallways are clear from equipment besides carts.    - Fall bracelet on for fall risk patients  - Ensure room is clear and free of clutter  - Suction is set up for ALL patients (with yanker)  - Hallways are clear from equipment besides carts.    - Isolation precautions followed, supplies available outside room, sign posted     Danielle Pisano LPN

## 2018-01-27 NOTE — ROUTINE PROCESS
Bedside and Verbal shift change report given to Yazmin Stokes LPN (oncoming nurse) by Azucena Austin RN (offgoing nurse). Report included the following information SBAR, Kardex, MAR and Recent Results. SITUATION:  Code Status: Full Code  Reason for Admission: Pneumonia  Community acquired pneumonia  Hospital day: 2  Problem List:       Hospital Problems  Date Reviewed: 8/31/2017          Codes Class Noted POA    Pneumonia ICD-10-CM: J18.9  ICD-9-CM: 403  1/25/2018 Unknown        Community acquired pneumonia ICD-10-CM: J18.9  ICD-9-CM: 824  1/25/2018 Unknown              BACKGROUND:   Past Medical History:   Past Medical History:   Diagnosis Date    Arthritis     Back pain, chronic       Patient taking anticoagulants yes    Patient has a defibrillator: no    History of shots YES for example, flu, pneumonia, tetanus   Isolation History NO for example, MRSA, CDiff    ASSESSMENT:  Changes in Assessment Throughout Shift: NONE  Significant Changes in 24 hours (for example, RR/code, fall)  Patient has Central Line: no Reasons if yes:   Patient has Oliver Cath: no Reasons if yes:     Mobility Issues  PT  IV Patency  OR Checklist  Pending Tests    Last Vitals:  Vitals w/ MEWS Score (last day)     Date/Time MEWS Score Pulse Resp Temp BP Level of Consciousness SpO2    01/27/18 0759 1 70 16 97.4 °F (36.3 °C) 120/78 Alert 98 %    01/27/18 0415 1 74 18 97.8 °F (36.6 °C) 125/79 Alert 99 %    01/27/18 0020 1 67 18 97.7 °F (36.5 °C) 150/90 Alert 100 %    01/26/18 2110 1 66 18 97.6 °F (36.4 °C) 141/88 Alert 99 %    01/26/18 2108 -- -- -- -- -- -- 97 %    01/26/18 1221 1 70 18 98.4 °F (36.9 °C) 137/86 Alert 98 %    01/26/18 0827 1 75 16 99.2 °F (37.3 °C) 126/79 Alert 100 %    01/26/18 0358 1 75 18 97.8 °F (36.6 °C) 119/55 Alert 100 %    01/26/18 0053 1 78 18 98.3 °F (36.8 °C) 126/86 Alert 99 %            PAIN    Pain Assessment    Pain Intensity 1: 0 (01/27/18 0400)              Patient Stated Pain Goal: 0  Intervention effective: yes  Time of last intervention: 1/27/18 Reassessment Completed: yes   Other actions taken for pain: NONE    Last 3 Weights:  Last 3 Recorded Weights in this Encounter    01/25/18 2018   Weight: 63.4 kg (139 lb 12.8 oz)   Weight change:     INTAKE/OUPUT    Current Shift:      Last three shifts: 01/25 1901 - 01/27 0700  In: 964 [P.O.:714; I.V.:250]  Out: 1475 [Urine:1475]    RECOMMENDATIONS AND DISCHARGE PLANNING  Patient needs and requests: NONE    Pending tests/procedures: NONE     Discharge plan for patient: TBD    Discharge planning Needs or Barriers: NONE    Estimated Discharge Date: TBD Posted on Whiteboard in Patients Room: yes       \"HEALS\" SAFETY CHECK  A safety check occurred in the patient's room between off going nurse and oncoming nurse listed above. The safety check included the below items:    H  High Alert Medications Verify all high alert medication drips (heparin, PCA, etc.)  E  Equipment Suction is set up for ALL patients (with ren)  Red plugs utilized for all equipment (IV pumps, etc.)  WOWs wiped down at end of shift. Room stocked with oxygen, suction, and other unit-specific supplies  A  Alarms Bed alarm is set for fall risk patients  Ensure chair alarm is in place and activated if patient is up in a chair  L  Lines Check IV for any infiltration  Oliver bag is empty if patient has a Oliver   Tubing and IV bags are labeled  S  Safety  Room is clean, patient is clean, and equipment is clean. Hallways are clear from equipment besides carts. Fall bracelet on for fall risk patients  Ensure room is clear and free of clutter  Suction is set up for ALL patients (with ren)  Hallways are clear from equipment besides carts.    Isolation precautions followed, supplies available outside room, sign posted    Wenceslao Mcfadden RN

## 2018-01-28 LAB
ANION GAP SERPL CALC-SCNC: 7 MMOL/L (ref 3–18)
BASOPHILS # BLD: 0 K/UL (ref 0–0.06)
BASOPHILS NFR BLD: 1 % (ref 0–3)
BUN SERPL-MCNC: 13 MG/DL (ref 7–18)
BUN/CREAT SERPL: 13 (ref 12–20)
CALCIUM SERPL-MCNC: 8.2 MG/DL (ref 8.5–10.1)
CHLORIDE SERPL-SCNC: 107 MMOL/L (ref 100–108)
CO2 SERPL-SCNC: 26 MMOL/L (ref 21–32)
CREAT SERPL-MCNC: 1.02 MG/DL (ref 0.6–1.3)
DIFFERENTIAL METHOD BLD: ABNORMAL
EOSINOPHIL # BLD: 0.3 K/UL (ref 0–0.4)
EOSINOPHIL NFR BLD: 7 % (ref 0–5)
ERYTHROCYTE [DISTWIDTH] IN BLOOD BY AUTOMATED COUNT: 13.5 % (ref 11.6–14.5)
GLUCOSE SERPL-MCNC: 84 MG/DL (ref 74–99)
HCT VFR BLD AUTO: 32.4 % (ref 36–48)
HGB BLD-MCNC: 10.1 G/DL (ref 13–16)
LYMPHOCYTES # BLD: 2.4 K/UL (ref 0.8–3.5)
LYMPHOCYTES NFR BLD: 59 % (ref 20–51)
MCH RBC QN AUTO: 26.2 PG (ref 24–34)
MCHC RBC AUTO-ENTMCNC: 31.2 G/DL (ref 31–37)
MCV RBC AUTO: 83.9 FL (ref 74–97)
MONOCYTES # BLD: 0.5 K/UL (ref 0–1)
MONOCYTES NFR BLD: 13 % (ref 2–9)
MYELOCYTES NFR BLD MANUAL: 1 %
NEUTS SEG # BLD: 0.7 K/UL (ref 1.8–8)
NEUTS SEG NFR BLD: 18 % (ref 42–75)
OTHER CELLS NFR BLD MANUAL: 1 %
PLATELET # BLD AUTO: 163 K/UL (ref 135–420)
PLATELET COMMENTS,PCOM: ABNORMAL
PMV BLD AUTO: 11.3 FL (ref 9.2–11.8)
POTASSIUM SERPL-SCNC: 3.6 MMOL/L (ref 3.5–5.5)
RBC # BLD AUTO: 3.86 M/UL (ref 4.7–5.5)
RBC MORPH BLD: ABNORMAL
SODIUM SERPL-SCNC: 140 MMOL/L (ref 136–145)
WBC # BLD AUTO: 4 K/UL (ref 4.6–13.2)

## 2018-01-28 PROCEDURE — 65270000029 HC RM PRIVATE

## 2018-01-28 PROCEDURE — 80048 BASIC METABOLIC PNL TOTAL CA: CPT | Performed by: PHYSICIAN ASSISTANT

## 2018-01-28 PROCEDURE — 74011250637 HC RX REV CODE- 250/637: Performed by: PHYSICIAN ASSISTANT

## 2018-01-28 PROCEDURE — 74011000258 HC RX REV CODE- 258: Performed by: HOSPITALIST

## 2018-01-28 PROCEDURE — 74011250637 HC RX REV CODE- 250/637: Performed by: INTERNAL MEDICINE

## 2018-01-28 PROCEDURE — 74011000250 HC RX REV CODE- 250: Performed by: INTERNAL MEDICINE

## 2018-01-28 PROCEDURE — 74011250636 HC RX REV CODE- 250/636: Performed by: PHYSICIAN ASSISTANT

## 2018-01-28 PROCEDURE — 36415 COLL VENOUS BLD VENIPUNCTURE: CPT | Performed by: PHYSICIAN ASSISTANT

## 2018-01-28 PROCEDURE — 85025 COMPLETE CBC W/AUTO DIFF WBC: CPT | Performed by: PHYSICIAN ASSISTANT

## 2018-01-28 PROCEDURE — 93306 TTE W/DOPPLER COMPLETE: CPT

## 2018-01-28 PROCEDURE — 74011250636 HC RX REV CODE- 250/636: Performed by: HOSPITALIST

## 2018-01-28 PROCEDURE — 74011250637 HC RX REV CODE- 250/637: Performed by: HOSPITALIST

## 2018-01-28 RX ADMIN — TIMOLOL MALEATE 1 DROP: 5 SOLUTION OPHTHALMIC at 08:51

## 2018-01-28 RX ADMIN — AMPICILLIN AND SULBACTAM 3 G: 1; 2 INJECTION, POWDER, FOR SOLUTION INTRAMUSCULAR; INTRAVENOUS at 12:30

## 2018-01-28 RX ADMIN — Medication 10 ML: at 05:27

## 2018-01-28 RX ADMIN — AMPICILLIN AND SULBACTAM 3 G: 1; 2 INJECTION, POWDER, FOR SOLUTION INTRAMUSCULAR; INTRAVENOUS at 17:44

## 2018-01-28 RX ADMIN — HEPARIN SODIUM 5000 UNITS: 5000 INJECTION, SOLUTION INTRAVENOUS; SUBCUTANEOUS at 05:26

## 2018-01-28 RX ADMIN — Medication 10 ML: at 18:32

## 2018-01-28 RX ADMIN — Medication 10 ML: at 21:13

## 2018-01-28 RX ADMIN — POTASSIUM & SODIUM PHOSPHATES POWDER PACK 280-160-250 MG 1 PACKET: 280-160-250 PACK at 08:47

## 2018-01-28 RX ADMIN — IPRATROPIUM BROMIDE AND ALBUTEROL SULFATE 3 ML: .5; 3 SOLUTION RESPIRATORY (INHALATION) at 17:44

## 2018-01-28 RX ADMIN — HEPARIN SODIUM 5000 UNITS: 5000 INJECTION, SOLUTION INTRAVENOUS; SUBCUTANEOUS at 14:02

## 2018-01-28 RX ADMIN — HEPARIN SODIUM 5000 UNITS: 5000 INJECTION, SOLUTION INTRAVENOUS; SUBCUTANEOUS at 21:11

## 2018-01-28 RX ADMIN — AMPICILLIN AND SULBACTAM 3 G: 1; 2 INJECTION, POWDER, FOR SOLUTION INTRAMUSCULAR; INTRAVENOUS at 05:26

## 2018-01-28 RX ADMIN — THERA TABS 1 TABLET: TAB at 08:48

## 2018-01-28 RX ADMIN — TIMOLOL MALEATE 1 DROP: 5 SOLUTION OPHTHALMIC at 21:12

## 2018-01-28 RX ADMIN — ASPIRIN 81 MG 81 MG: 81 TABLET ORAL at 08:47

## 2018-01-28 NOTE — PROGRESS NOTES
Patient is not available to complete the Advance Medical Directive Consult at this time.     [address] [facility].   [phone]

## 2018-01-28 NOTE — PROGRESS NOTES
Problem: Falls - Risk of  Goal: *Absence of Falls  Document Wagner Fall Risk and appropriate interventions in the flowsheet.    Outcome: Progressing Towards Goal  Fall Risk Interventions:  Mobility Interventions: Bed/chair exit alarm         Medication Interventions: Patient to call before getting OOB, Teach patient to arise slowly    Elimination Interventions: Call light in reach

## 2018-01-28 NOTE — CONSULTS
[facility]. Pulmonary, Critical Care, and Sleep Medicine    Initial Patient Consult    Name: Flor Heck MRN: [id number]   : 3/14/1927 Hospital: [address] [place] [address]    Date: 2018        IMPRESSION/ PLAN:   · Subjective Dyspnea as per chart (patient denies any breathing problems) - Resolved- on room air. Ambulate patient to assess SpO2. ·   · GGO on CT chest- suspect aspiration given history- On Unasyn - can transition to PO antibioitc    · Asbestos pleural plaques- BENIGN, chronic. Not the cause of current symptoms. NO effusions on CXR. Concern has been raised by radiologist read about lung nodules. Get routine CT chest to evaluate that. On previous abd CT no evidence of asbestosis atleast on lower cuts of lungs  ·   · Pulmonary nodule of CT chest: 2.5mm    Dispo: patient should have OP pulmonary follow up with follow up CT chest            Subjective:     POOR HISTORIAN/ HARD OF HEARING- HISTORY OBTAINED FROM CHART AND SON AT BEDSIDE    This patient has been seen and evaluated at the request of Dr. Jessica Boles for dyspnea and finding of Asbestos pleural plaques. Patient is a 80 y.o. male who has poor po intake for 8 days. As per son he does not have fever, cough. Usually very functional at baseline but not being himself for a week or so. No h/o orthopnea/PND/Headache/confusion. Patient denies any breathing problems    18  - Patient resting in bed today- States his breathing is doing good today  - No cough, sputum production, hemoptysis or dyspnea  - afebrile  - no oxygen requirement    Prior Occupation work as ship and aircraft fitter- he is not aware of any exposures   Pets: none    Past Medical History:   Diagnosis Date    Arthritis     Back pain, chronic       Past Surgical History:   Procedure Laterality Date    HX HEENT      relieved pressure R eye 2014      Prior to Admission medications    Medication Sig Start Date End Date Taking?  Authorizing Provider gabapentin (NEURONTIN) 300 mg capsule Take 300 mg by mouth three (3) times daily. Yes Historical Provider   latanoprost (XALATAN) 0.005 % ophthalmic solution Administer 1 Drop to both eyes nightly. Historical Provider   loteprednol etabonate (LOTEMAX) 0.5 % drpg Administer 1 Each to both eyes daily. (1) drop in both eyes every day    Historical Provider   bromfenac (PROLENSA) 0.07 % ophthalmic solution Administer 1 Drop to both eyes daily. Historical Provider   polyvinyl alcohol-povidon,PF, (REFRESH CLASSIC, PF,) 1.4-0.6 % ophthalmic solution Administer 1-2 Drops to both eyes as needed. Historical Provider   fentaNYL (DURAGESIC) 75 mcg/hr Apply 1 patch every 3 days for chronic pain 17   Antonella Ledezma MD   HYDROcodone-acetaminophen [facility])  mg tablet Take 1-2 tabs po daily prn chronic pain 17   Antonella Ledezma MD   timolol (TIMOPTIC) 0.5 % ophthalmic solution Administer 1 Drop to both eyes two (2) times a day. 3/9/16   Historical Provider     No Known Allergies   Social History   Substance Use Topics    Smoking status: Never Smoker    Smokeless tobacco: Never Used    Alcohol use No      Family History   Problem Relation Age of Onset    Diabetes Father     No Known Problems Mother         Review of Systems:  Pertinent items are noted in HPI.   ROS    Objective:   Vital Signs:    Visit Vitals    /78 (BP 1 Location: Left arm, BP Patient Position: At rest)    Pulse 89    Temp 96.4 °F (35.8 °C)    Resp 20    Ht 6' 1\" (1.854 m)    Wt 63.4 kg (139 lb 12.8 oz)    SpO2 100%    BMI 18.44 kg/m2       O2 Device: Room air       Temp (24hrs), Av.3 °F (36.3 °C), Min:96.4 °F (35.8 °C), Max:97.8 °F (36.6 °C)       Intake/Output:   Last shift:      701 - 1900  In: -   Out: 100 [Urine:100]  Last 3 shifts:  190 -  0700  In: 237 [P.O.:237]  Out: 1150 [Urine:1150]    Intake/Output Summary (Last 24 hours) at 18 9093  Last data filed at 18 4291   Gross per 24 hour   Intake                0 ml   Output              750 ml   Net             -750 ml      Physical Exam:   General:  Alert, pleasant, cooperative, no distress, appears stated age. Head:  Normocephalic, without obvious abnormality, atraumatic. Eyes:  Conjunctivae/corneas clear. ANicteric   Nose: Nares normal. Mucosa normal. No drainage or sinus tenderness. Throat: Lips, mucosa dry. NO thrush;    Neck: Supple, symmetrical, trachea midline, no adenopathy, thyroid: no enlargment/tenderness/nodules, no carotid bruit and no JVD. No crepitus   Back:   Symmetric, no curvature, no spine tenderness or flank pain   Lungs:   Bilateral auscultation CTA b/l- no wheezing, crackles or rhonchi   Chest wall:  No tenderness or deformity. NO CREPITUS   Heart:  Regular rate and rhythm, S1, S2 normal, no murmur, click, rub or gallop. Abdomen:   Soft, non-tender. Bowel sounds normal. No masses,  No organomegaly. No paradox   Extremities: normal, atraumatic, no cyanosis or edema. Pulses: 1-2+ and symmetric all extremities.    Skin: Skin color, texture, turgor normal. No rashes or lesions   Lymph nodes: Cervical, supraclavicular, and axillary nodes normal.   Neurologic: Grossly nonfocal          Data review:   Labs:  Lab Results   Component Value Date/Time    Sodium 140 01/28/2018 03:25 AM    Potassium 3.6 01/28/2018 03:25 AM    Chloride 107 01/28/2018 03:25 AM    CO2 26 01/28/2018 03:25 AM    Anion gap 7 01/28/2018 03:25 AM    Glucose 84 01/28/2018 03:25 AM    BUN 13 01/28/2018 03:25 AM    Creatinine 1.02 01/28/2018 03:25 AM    BUN/Creatinine ratio 13 01/28/2018 03:25 AM    GFR est AA >60 01/28/2018 03:25 AM    GFR est non-AA >60 01/28/2018 03:25 AM    Calcium 8.2 01/28/2018 03:25 AM     Lab Results   Component Value Date/Time    WBC 4.0 01/28/2018 03:25 AM    HGB 10.1 01/28/2018 03:25 AM    HCT 32.4 01/28/2018 03:25 AM    PLATELET 653 68/24/7465 03:25 AM    MCV 83.9 01/28/2018 03:25 AM     Lab Results   Component Value Date/Time    TSH 1.17 02/28/2017 10:44 AM    TSH 1.82 09/22/2016 12:20 PM    TSH 1.87 04/19/2013 04:00 AM             Imaging:  CT Chest:  1/26/18    IMPRESSION:      No evidence for pulmonary embolism.     Small amount of scattered groundglass opacities as above. Please correlate  clinically for atelectasis or infiltrate. -2-5 mm lung nodules. Follow-up as clinically warranted as per Fleischner  criteria. -Calcified pleural plaques again noted.        Clinical care, chart review and time spent coordinating care: 20 min  Malathi Burrell DO, [facility]  Pulmonary, Sleep, Critical Care Medicine

## 2018-01-28 NOTE — ROUTINE PROCESS
Bedside and Verbal shift change report given to Duane Flicker, LPN (oncoming nurse) by Danae Cardoza RN (offgoing nurse). Report included the following information SBAR, Kardex, MAR and Recent Results. SITUATION:  Code Status: Full Code  Reason for Admission: Pneumonia  Community acquired pneumonia  Hospital day: 3  Problem List:       Hospital Problems  Date Reviewed: 8/31/2017          Codes Class Noted POA    Pneumonia ICD-10-CM: J18.9  ICD-9-CM: 314  1/25/2018 Unknown        Community acquired pneumonia ICD-10-CM: J18.9  ICD-9-CM: 655  1/25/2018 Unknown              BACKGROUND:   Past Medical History:   Past Medical History:   Diagnosis Date    Arthritis     Back pain, chronic       Patient taking anticoagulants yes    Patient has a defibrillator: no    History of shots YES for example, flu, pneumonia, tetanus   Isolation History NO for example, MRSA, CDiff    ASSESSMENT:  Changes in Assessment Throughout Shift: NONE  Significant Changes in 24 hours (for example, RR/code, fall)  Patient has Central Line: no Reasons if yes:   Patient has Oliver Cath: no Reasons if yes:     Mobility Issues  PT  IV Patency  OR Checklist  Pending Tests    Last Vitals:  Vitals w/ MEWS Score (last day)     Date/Time MEWS Score Pulse Resp Temp BP Level of Consciousness SpO2    01/28/18 0707 1 69 20 97.8 °F (36.6 °C) 121/77 Alert 97 %    01/28/18 0329 1 67 16 97.6 °F (36.4 °C) 119/76 Alert 97 %    01/27/18 2328 1 65 17 96.6 °F (35.9 °C) 127/79 Alert 99 %    01/27/18 1954 1 72 16 97.4 °F (36.3 °C) 117/74 Alert 97 %    01/27/18 1627 1 72 18 97 °F (36.1 °C) 110/74 Alert 98 %    01/27/18 1312 -- -- -- -- -- -- 98 %    01/27/18 1159 1 70 18 97.6 °F (36.4 °C) 111/66 Alert 98 %    01/27/18 0859 -- -- -- -- -- Alert --    01/27/18 0759 1 70 16 97.4 °F (36.3 °C) 120/78 Alert 98 %    01/27/18 0415 1 74 18 97.8 °F (36.6 °C) 125/79 Alert 99 %    01/27/18 0020 1 67 18 97.7 °F (36.5 °C) 150/90 Alert 100 %            PAIN    Pain Assessment    Pain Intensity 1: 0 (01/28/18 0707)              Patient Stated Pain Goal: 0  Intervention effective: yes  Time of last intervention: No stated pain Reassessment Completed: yes   Other actions taken for pain: NONE    Last 3 Weights:  Last 3 Recorded Weights in this Encounter    01/25/18 2018   Weight: 63.4 kg (139 lb 12.8 oz)   Weight change:     INTAKE/OUPUT    Current Shift:      Last three shifts: 01/26 1901 - 01/28 0700  In: 237 [P.O.:237]  Out: 1150 [Urine:1150]    RECOMMENDATIONS AND DISCHARGE PLANNING  Patient needs and requests: NONE    Pending tests/procedures: NONE     Discharge plan for patient: TBD    Discharge planning Needs or Barriers: NONE    Estimated Discharge Date: TBD Posted on Whiteboard in Patients Room: yes       \"HEALS\" SAFETY CHECK  A safety check occurred in the patient's room between off going nurse and oncoming nurse listed above. The safety check included the below items:    H  High Alert Medications Verify all high alert medication drips (heparin, PCA, etc.)  E  Equipment Suction is set up for ALL patients (with ren)  Red plugs utilized for all equipment (IV pumps, etc.)  WOWs wiped down at end of shift. Room stocked with oxygen, suction, and other unit-specific supplies  A  Alarms Bed alarm is set for fall risk patients  Ensure chair alarm is in place and activated if patient is up in a chair  L  Lines Check IV for any infiltration  Oliver bag is empty if patient has a Oliver   Tubing and IV bags are labeled  S  Safety  Room is clean, patient is clean, and equipment is clean. Hallways are clear from equipment besides carts. Fall bracelet on for fall risk patients  Ensure room is clear and free of clutter  Suction is set up for ALL patients (with ren)  Hallways are clear from equipment besides carts.    Isolation precautions followed, supplies available outside room, sign posted    Nikos Pena RN

## 2018-01-29 ENCOUNTER — HOME HEALTH ADMISSION (OUTPATIENT)
Dept: HOME HEALTH SERVICES | Facility: HOME HEALTH | Age: 83
End: 2018-01-29
Payer: MEDICARE

## 2018-01-29 VITALS
OXYGEN SATURATION: 95 % | TEMPERATURE: 97.1 F | HEIGHT: 73 IN | DIASTOLIC BLOOD PRESSURE: 73 MMHG | HEART RATE: 73 BPM | SYSTOLIC BLOOD PRESSURE: 113 MMHG | WEIGHT: 139.8 LBS | BODY MASS INDEX: 18.53 KG/M2 | RESPIRATION RATE: 18 BRPM

## 2018-01-29 LAB
ANION GAP SERPL CALC-SCNC: 7 MMOL/L (ref 3–18)
BACTERIA SPEC CULT: ABNORMAL
BASOPHILS # BLD: 0 K/UL (ref 0–0.1)
BASOPHILS NFR BLD: 0 % (ref 0–2)
BUN SERPL-MCNC: 11 MG/DL (ref 7–18)
BUN/CREAT SERPL: 11 (ref 12–20)
CALCIUM SERPL-MCNC: 8.4 MG/DL (ref 8.5–10.1)
CHLORIDE SERPL-SCNC: 107 MMOL/L (ref 100–108)
CO2 SERPL-SCNC: 26 MMOL/L (ref 21–32)
CREAT SERPL-MCNC: 1.02 MG/DL (ref 0.6–1.3)
DIFFERENTIAL METHOD BLD: ABNORMAL
EOSINOPHIL # BLD: 0.3 K/UL (ref 0–0.4)
EOSINOPHIL NFR BLD: 5 % (ref 0–5)
ERYTHROCYTE [DISTWIDTH] IN BLOOD BY AUTOMATED COUNT: 13.3 % (ref 11.6–14.5)
GLUCOSE SERPL-MCNC: 81 MG/DL (ref 74–99)
GRAM STN SPEC: ABNORMAL
GRAM STN SPEC: ABNORMAL
HCT VFR BLD AUTO: 32.6 % (ref 36–48)
HGB BLD-MCNC: 10.3 G/DL (ref 13–16)
LYMPHOCYTES # BLD: 1.7 K/UL (ref 0.9–3.6)
LYMPHOCYTES NFR BLD: 35 % (ref 21–52)
MCH RBC QN AUTO: 26.1 PG (ref 24–34)
MCHC RBC AUTO-ENTMCNC: 31.6 G/DL (ref 31–37)
MCV RBC AUTO: 82.7 FL (ref 74–97)
MONOCYTES # BLD: 0.6 K/UL (ref 0.05–1.2)
MONOCYTES NFR BLD: 12 % (ref 3–10)
NEUTS SEG # BLD: 2.3 K/UL (ref 1.8–8)
NEUTS SEG NFR BLD: 48 % (ref 40–73)
PLATELET # BLD AUTO: 163 K/UL (ref 135–420)
PMV BLD AUTO: 11.2 FL (ref 9.2–11.8)
POTASSIUM SERPL-SCNC: 3.7 MMOL/L (ref 3.5–5.5)
RBC # BLD AUTO: 3.94 M/UL (ref 4.7–5.5)
SERVICE CMNT-IMP: ABNORMAL
SODIUM SERPL-SCNC: 140 MMOL/L (ref 136–145)
WBC # BLD AUTO: 4.8 K/UL (ref 4.6–13.2)

## 2018-01-29 PROCEDURE — 74011250636 HC RX REV CODE- 250/636: Performed by: PHYSICIAN ASSISTANT

## 2018-01-29 PROCEDURE — 85025 COMPLETE CBC W/AUTO DIFF WBC: CPT | Performed by: PHYSICIAN ASSISTANT

## 2018-01-29 PROCEDURE — 74011000258 HC RX REV CODE- 258: Performed by: HOSPITALIST

## 2018-01-29 PROCEDURE — 74011250636 HC RX REV CODE- 250/636: Performed by: HOSPITALIST

## 2018-01-29 PROCEDURE — 36415 COLL VENOUS BLD VENIPUNCTURE: CPT | Performed by: PHYSICIAN ASSISTANT

## 2018-01-29 PROCEDURE — 80048 BASIC METABOLIC PNL TOTAL CA: CPT | Performed by: PHYSICIAN ASSISTANT

## 2018-01-29 PROCEDURE — 74011250637 HC RX REV CODE- 250/637: Performed by: INTERNAL MEDICINE

## 2018-01-29 PROCEDURE — 74011250637 HC RX REV CODE- 250/637: Performed by: PHYSICIAN ASSISTANT

## 2018-01-29 PROCEDURE — 77030011255 HC DSG AQUACEL AG BMS -A

## 2018-01-29 PROCEDURE — 74011000250 HC RX REV CODE- 250: Performed by: INTERNAL MEDICINE

## 2018-01-29 RX ORDER — THERA TABS 400 MCG
1 TAB ORAL DAILY
Qty: 30 TAB | Refills: 11 | Status: SHIPPED | OUTPATIENT
Start: 2018-01-30 | End: 2021-08-13

## 2018-01-29 RX ORDER — L. ACIDOPHILUS/L.BULGARICUS 100MM CELL
1 GRANULES IN PACKET (EA) ORAL 2 TIMES DAILY
Qty: 16 PACKET | Refills: 0 | Status: SHIPPED | OUTPATIENT
Start: 2018-01-29 | End: 2018-02-06

## 2018-01-29 RX ORDER — BISACODYL 5 MG
5 TABLET, DELAYED RELEASE (ENTERIC COATED) ORAL
Qty: 25 TAB | Refills: 2 | Status: SHIPPED | OUTPATIENT
Start: 2018-01-29 | End: 2021-10-06

## 2018-01-29 RX ORDER — AMOXICILLIN 250 MG
2 CAPSULE ORAL DAILY
Status: DISCONTINUED | OUTPATIENT
Start: 2018-01-30 | End: 2018-01-29 | Stop reason: HOSPADM

## 2018-01-29 RX ORDER — AMOXICILLIN 250 MG
2 CAPSULE ORAL DAILY
Qty: 60 TAB | Refills: 2 | Status: SHIPPED | OUTPATIENT
Start: 2018-01-30 | End: 2021-08-13

## 2018-01-29 RX ORDER — BISACODYL 5 MG
5 TABLET, DELAYED RELEASE (ENTERIC COATED) ORAL DAILY PRN
Status: DISCONTINUED | OUTPATIENT
Start: 2018-01-29 | End: 2018-01-29 | Stop reason: HOSPADM

## 2018-01-29 RX ORDER — AMOXICILLIN AND CLAVULANATE POTASSIUM 500; 125 MG/1; MG/1
1 TABLET, FILM COATED ORAL EVERY 12 HOURS
Qty: 10 TAB | Refills: 0 | Status: SHIPPED | OUTPATIENT
Start: 2018-01-29 | End: 2018-02-03

## 2018-01-29 RX ORDER — GUAIFENESIN 100 MG/5ML
100 SOLUTION ORAL
Qty: 118 ML | Refills: 0 | Status: SHIPPED | OUTPATIENT
Start: 2018-01-29 | End: 2021-08-13

## 2018-01-29 RX ADMIN — IPRATROPIUM BROMIDE AND ALBUTEROL SULFATE 3 ML: .5; 3 SOLUTION RESPIRATORY (INHALATION) at 09:02

## 2018-01-29 RX ADMIN — TIMOLOL MALEATE 1 DROP: 5 SOLUTION OPHTHALMIC at 08:00

## 2018-01-29 RX ADMIN — ASPIRIN 81 MG 81 MG: 81 TABLET ORAL at 09:02

## 2018-01-29 RX ADMIN — AMPICILLIN AND SULBACTAM 3 G: 1; 2 INJECTION, POWDER, FOR SOLUTION INTRAMUSCULAR; INTRAVENOUS at 00:26

## 2018-01-29 RX ADMIN — HEPARIN SODIUM 5000 UNITS: 5000 INJECTION, SOLUTION INTRAVENOUS; SUBCUTANEOUS at 05:22

## 2018-01-29 RX ADMIN — Medication 10 ML: at 05:22

## 2018-01-29 RX ADMIN — AMPICILLIN AND SULBACTAM 3 G: 1; 2 INJECTION, POWDER, FOR SOLUTION INTRAMUSCULAR; INTRAVENOUS at 05:22

## 2018-01-29 RX ADMIN — THERA TABS 1 TABLET: TAB at 09:02

## 2018-01-29 RX ADMIN — AMPICILLIN AND SULBACTAM 3 G: 1; 2 INJECTION, POWDER, FOR SOLUTION INTRAMUSCULAR; INTRAVENOUS at 12:55

## 2018-01-29 NOTE — PROGRESS NOTES
NUTRITION    Nutrition Consult: General Nutrition Management & Supplements; Diet Education       RECOMMENDATIONS / PLAN:     - Add Ensure Enlive TID  - Diet education provided today to pt and family member  - Continue RD inpatient monitoring and evaluation. NUTRITION INTERVENTIONS & DIAGNOSIS:     [x] Meals/snacks: general/healthful diet; NPO  [x] Vitamin/ mineral supplement therapy:  MVI  [x] Medical food supplement therapy: initiate   [x] Nutrition education: high fiber diet on 1/29    Nutrition Diagnosis:  Inadequate oral intake related to poor appetite as evidenced by poor meal intake PTA. Food and nutrition related knowledge deficit related to high fiber diet as evidenced by pt report. Patient meets criteria for Severe Protein Calorie Malnutrition as evidenced by:   ASPEN Malnutrition Criteria  Acute Illness, Chronic Illness, or Social/Enviornmental: Acute illness  Energy Intake: Less than/equal to 50% est energy req for greater than/equal to 5 days  Weight Loss: Greater than 7.5% x 3 mos  ASPEN Malnutrition Score - Acute Illness: 12  Acute Illness - Malnutrition Diagnosis: Severe malnutrition. ASSESSMENT:     1/29: Pt reported appetite and meal intake improving. Feeling better then previous visit. Encouraged meal intake. Pt agreeable to nutrition supplements. Discussed nutrition recommendations for when discharged. Per family member, MD recommending pt have daily BM. Provided diet education on high fiber diet. Educational handout provided and reviewed with them. Encouraged po fluid intake when increasing fiber intake. Both verbalized understanding. 1/26: Pt reported poor appetite and meal intake x 1 week PTA; unplanned wt loss PTA. C/o stomach pain. Pt refusing to continue nutrition visit. Refused addition of nutrition supplement. Per nursing, pt did not eat breakfast today and was NPO during lunch for ultra sound.      Average po intake adequate to meet patients estimated nutritional needs:   [] Yes     [x] No   [] Unable to determine at this time    Diet: DIET REGULAR      Food Allergies:  None known   Current Appetite:   [] Good     [x] Fair     [] Poor     [] Other:  Appetite/meal intake prior to admission:   [] Good     [] Fair     [x] Poor: x 1 week PTA     [] Other:  Feeding Limitations:  [] Swallowing difficulty    [] Chewing difficulty    [] Other:  Current Meal Intake:   Patient Vitals for the past 100 hrs:   % Diet Eaten   01/29/18 0958 60 %   01/26/18 1600 25 %   01/26/18 0930 20 %       BM:  1/28  Skin Integrity:  No pressure injury or wound noted  Edema: none   Pertinent Medications: Reviewed    Recent Labs      01/29/18   0256  01/28/18   0325  01/27/18   0410   NA  140  140  141   K  3.7  3.6  3.7   CL  107  107  108   CO2  26 26 26   GLU  81  84  77   BUN  11  13  16   CREA  1.02  1.02  0.96   CA  8.4*  8.2*  8.4*       Intake/Output Summary (Last 24 hours) at 01/29/18 1608  Last data filed at 01/29/18 0958   Gross per 24 hour   Intake              320 ml   Output              300 ml   Net               20 ml       Anthropometrics:  Ht Readings from Last 1 Encounters:   01/25/18 6' 1\" (1.854 m)     Last 3 Recorded Weights in this Encounter    01/25/18 2018   Weight: 63.4 kg (139 lb 12.8 oz)     Body mass index is 18.44 kg/(m^2).           Weight History:   Pt had unplanned wt loss of 12 lb (7.9%) in past 2 months PTA per chart hx    Weight Metrics 1/25/2018 11/29/2017 8/31/2017 6/1/2017 3/1/2017 12/1/2016 9/8/2016   Weight 139 lb 12.8 oz 151 lb 147 lb 142 lb 140 lb 140 lb 140 lb 12.8 oz   BMI 18.44 kg/m2 19.92 kg/m2 19.39 kg/m2 18.73 kg/m2 18.47 kg/m2 18.47 kg/m2 18.58 kg/m2        Admitting Diagnosis: Pneumonia  Community acquired pneumonia  Pertinent PMHx: renal failure    Education Needs:        [] None identified  [] Identified - Not appropriate at this time  [x]  Identified and addressed - refer to education log  Learning Limitations:   [] None identified  [x] Identified: hard of hearing    Cultural, Latter-day & ethnic food preferences:  [x] None identified    [] Identified and addressed     ESTIMATED NUTRITION NEEDS:     Calories: 9840-4068 kcal (30-35 kcal/kg) based on  [x] Actual BW: 63 kg     [] IBW   Protein: 38-50 gm (0.6-0.8 gm/kg) based on  [x] Actual BW      [] IBW   Fluid: 1 mL/kcal     MONITORING & EVALUATION:     Nutrition Goal(s):   1. Po intake of meals will meet >75% of patient estimated nutritional needs within the next 7 days. Outcome:  [] Met/Ongoing    [x]  Not Met/Progressing    [] New/Initial Goal   2. Patient will increase knowledge of appropriate food choices on a high fiber diet within 7 days.   Outcome:  [] Met    []  Not Met    [x] New/Initial Goal       Monitoring:   [x] Food and beverage intake   [x] Diet order   [x] Nutrition-focused physical findings   [x] Treatment/therapy   [] Weight   [] Enteral nutrition intake        Previous Recommendations (for follow-up assessments only):     [x]   Implemented       []   Not Implemented (RD to address)      [] No Longer Appropriate     [] No Recommendation Made     Discharge Planning:  Regular diet; consistency per SLP + nutrition supplement TID  [x] Participated in care planning, discharge planning, & interdisciplinary rounds as appropriate      Monty England, [address]   Pager: [phone]

## 2018-01-29 NOTE — CONSULTS
Infectious Disease Consultation Note    Requested by: Dr. Rebecca Mejia    Reason: gram positive bacteremia    Current abx Prior abx   Amp/sulbactam since 1/26 Ceftriaxone, azithromycin 1/25       Lines:       Assessment :    80 y.o.  male with no significant past medical history who presented to ed on 1/25/18 with worsening cough, congestion and weakness over the past 3 days. Clinical presentation c/w aspiration pneumonia. Single positive blood culture for anaerobic gram positive cocci on 1/25 could be secondary to pneumonia or dental infection. Repeat blood cultures 1/27. Clinically better. Recommendations:    1. D/c amp/sulbactam. Start po amoxicillin/clavulanate till 2/3/18  2. Dental extraction as soon as feasible  3. F/u ID of gpc (d/w micro lab - will be available by 1 pm)  4. Ok to discharge patient from ID standpoint  5. Lactobacillus for 8 days. Advance Care planning: full code: discussed  with patient/surrogate decision maker:Santiago Bhatti [facility]: [phone]      Thank you for consultation request. Above plan was discussed in details with patient, family and dr Rebecca Mejia. Please call me if any further questions or concerns. Will continue to participate in the care of this patient. HPI:    80 y.o.  male with no significant past medical history who presented to ed on 1/25/18 with worsening cough, congestion and weakness over the past 3 days. I obtained history by talking to patient and son at bedside, review of records. Pt has had decreased po intake and appetite. Apparently patient had vomiting after taking some pills a week prior to admission. He subsequently started feeling weak and tired, increased sob. He was scheduled to have dental extraction for his carious tooth this week. In the ed, he was noted to have RLL infiltrate on his cxr. He was flu negative and, per chart review, had a mild bump in his troponin with an ekg showing ST with RBBB.  Patient was started on ceftriaxone, vancomycin. Was evaluated by pulmonary. Ct chest revealed bilateral groundglass opacities. His abx were switched to unasyn since aspiration pneumonia was suspected. One set of blood culture 1/25 reveals anaerobic gram positive cocci. Repeat blood cultures negative. I have been consulted for further recommendations. Patient denies headaches, visual disturbances, sore throat, runny nose, earaches, cp, sob, chills, cough, abdominal pain, diarrhea, burning micturition, pain or weakness in extremities. He denies back pain/flank pain. He denies recent sick contacts. No h/o recent travel. No known h/o MRSA colonization or infection in the past.        Past Medical History:   Diagnosis Date    Arthritis     Back pain, chronic        Past Surgical History:   Procedure Laterality Date    HX HEENT      relieved pressure R eye 1/2014       home Medication List    Details   gabapentin (NEURONTIN) 300 mg capsule Take 300 mg by mouth three (3) times daily. latanoprost (XALATAN) 0.005 % ophthalmic solution Administer 1 Drop to both eyes nightly. loteprednol etabonate (LOTEMAX) 0.5 % drpg Administer 1 Each to both eyes daily. (1) drop in both eyes every day      bromfenac (PROLENSA) 0.07 % ophthalmic solution Administer 1 Drop to both eyes daily. polyvinyl alcohol-povidon,PF, (REFRESH CLASSIC, PF,) 1.4-0.6 % ophthalmic solution Administer 1-2 Drops to both eyes as needed. fentaNYL (DURAGESIC) 75 mcg/hr Apply 1 patch every 3 days for chronic pain  Qty: 10 Patch, Refills: 0    Associated Diagnoses: Chronic bilateral low back pain without sciatica      HYDROcodone-acetaminophen (NORCO)  mg tablet Take 1-2 tabs po daily prn chronic pain  Qty: 45 Tab, Refills: 0    Associated Diagnoses: Chronic bilateral low back pain without sciatica      timolol (TIMOPTIC) 0.5 % ophthalmic solution Administer 1 Drop to both eyes two (2) times a day.              Current Facility-Administered Medications   Medication Dose Route Frequency    ampicillin-sulbactam (UNASYN) 3 g in 0.9% sodium chloride (MBP/ADV) 100 mL MBP  3 g IntraVENous Q6H    heparin (porcine) injection 5,000 Units  5,000 Units SubCUTAneous Q8H    bisacodyl (DULCOLAX) tablet 5 mg  5 mg Oral DAILY PRN    guaiFENesin (ROBITUSSIN) 100 mg/5 mL oral liquid 100 mg  100 mg Oral Q4H PRN    therapeutic multivitamin (THERAGRAN) tablet 1 Tab  1 Tab Oral DAILY    fentaNYL (DURAGESIC) 75 mcg/hr patch 1 Patch  1 Patch TransDERmal Q72H    timolol (TIMOPTIC) 0.5 % ophthalmic solution 1 Drop  1 Drop Both Eyes BID    sodium chloride (NS) flush 5-10 mL  5-10 mL IntraVENous Q8H    sodium chloride (NS) flush 5-10 mL  5-10 mL IntraVENous PRN    acetaminophen (TYLENOL) tablet 650 mg  650 mg Oral Q4H PRN    albuterol-ipratropium (DUO-NEB) 2.5 MG-0.5 MG/3 ML  3 mL Nebulization QID RT    aspirin chewable tablet 81 mg  81 mg Oral DAILY       Allergies: Review of patient's allergies indicates no known allergies. Family History   Problem Relation Age of Onset    Diabetes Father     No Known Problems Mother      Social History     Social History    Marital status:      Spouse name: N/A    Number of children: N/A    Years of education: N/A     Occupational History    Not on file.      Social History Main Topics    Smoking status: Never Smoker    Smokeless tobacco: Never Used    Alcohol use No    Drug use: No    Sexual activity: Not on file     Other Topics Concern    Not on file     Social History Narrative     History   Smoking Status    Never Smoker   Smokeless Tobacco    Never Used        Temp (24hrs), Av.3 °F (36.3 °C), Min:96.4 °F (35.8 °C), Max:97.9 °F (36.6 °C)    Visit Vitals    /78 (BP 1 Location: Left arm, BP Patient Position: At rest)    Pulse 78    Temp 97.3 °F (36.3 °C)    Resp 18    Ht 6' 1\" (1.854 m)    Wt 63.4 kg (139 lb 12.8 oz)    SpO2 97%    BMI 18.44 kg/m2       ROS: 12 point ROS obtained in details. Pertinent positives as mentioned in HPI,   otherwise negative    Physical Exam:    General: Well developed, well nourished male laying on the bed AAOx3 in no acute distress. General:   awake alert and oriented   HEENT:  Normocephalic, atraumatic, PERRL, EOMI, no scleral icterus or pallor; no conjunctival hemmohage;  nasal and oral mucous are moist and without evidence of lesions. No thrush. Missing teeth. Carious right upper molar  Neck supple, no bruits. Lymph Nodes:   no cervical, axillary or inguinal adenopathy   Lungs:   non-labored, bilaterally clear to auscultation- no crackles wheezes rales or rhonchi   Heart:  RRR, s1 and s2; no murmurs rubs or gallops, no edema, + pedal pulses   Abdomen:  soft, non-distended, active bowel sounds, no hepatomegaly,  Non-tender   Genitourinary:  deferred   Extremities:   no clubbing, cyanosis; no joint effusions or swelling; Full ROM of all large joints to the upper and lower extremities; muscle mass appropriate for age   Neurologic:  No gross focal sensory abnormalities; 5/5 muscle strength to upper and lower extremities. Speech appropriate.  Cranial nerves intact                        Skin:  No rash or ulcers noted   Back:  no spinal or paraspinal muscle tenderness or rigidity, no CVA tenderness     Psychiatric:  No suicidal or homicidal ideations, appropriate mood and affect         Labs: Results:   Chemistry Recent Labs      01/29/18 0256 01/28/18 0325 01/27/18   0410  01/26/18   1115   GLU  81  84  77  84   NA  140  140  141  140   K  3.7  3.6  3.7  3.8   CL  107  107  108  108   CO2  26  26  26  26   BUN  11  13  16  19*   CREA  1.02  1.02  0.96  1.14   CA  8.4*  8.2*  8.4*  8.3*   AGAP  7  7  7  6   BUCR  11*  13  17  17   AP   --    --    --   243*   TP   --    --    --   7.0   ALB   --    --    --   2.8*   GLOB   --    --    --   4.2*   AGRAT   --    --    --   0.7*      CBC w/Diff Recent Labs      01/29/18 0256 01/28/18 0325  01/27/18   0410 WBC  4.8  4.0*  3.6*   RBC  3.94*  3.86*  3.94*   HGB  10.3*  10.1*  10.4*   HCT  32.6*  32.4*  33.0*   PLT  163  163  160   GRANS  48  18*  17*   LYMPH  35  59*  66*   EOS  5  7*  2      Microbiology Recent Labs      01/27/18   1028  01/27/18   1017   CULT  NO GROWTH 2 DAYS  NO GROWTH 2 DAYS          RADIOLOGY:    All available imaging studies/reports in [facility] for this admission were reviewed    Dr. Shekhar Keller, Infectious Disease Specialist  [phone]  January 29, 2018  11:10 AM

## 2018-01-29 NOTE — PROGRESS NOTES
Bedside and Verbal shift change report given to Azucena VIGIL (oncoming nurse) by Severo Marley LPN (offgoing nurse). Report included the following information SBAR, Kardex, MAR and Recent Results.     SITUATION:    Code Status: Full Code   Reason for Admission: Pneumonia   Community acquired pneumonia    [facility] day: 3   Problem List:       Hospital Problems  Date Reviewed: 8/31/2017          Codes Class Noted POA    Pneumonia ICD-10-CM: J18.9  ICD-9-CM: 976  1/25/2018 Unknown        Community acquired pneumonia ICD-10-CM: J18.9  ICD-9-CM: 488  1/25/2018 Unknown              BACKGROUND:    Past Medical History:   Past Medical History:   Diagnosis Date    Arthritis     Back pain, chronic          Patient taking anticoagulants yes     ASSESSMENT:    Changes in Assessment Throughout Shift: None     Patient has Central Line: no Patient has Oliver Cath: no      Last Vitals:     Vitals:    01/28/18 0329 01/28/18 0707 01/28/18 1125 01/28/18 1624   BP: 119/76 121/77 116/71 109/78   Pulse: 67 69 67 89   Resp: 16 20 18 20   Temp: 97.6 °F (36.4 °C) 97.8 °F (36.6 °C) 97.8 °F (36.6 °C) 96.4 °F (35.8 °C)   SpO2: 97% 97% 98% 100%   Weight:       Height:            IV and DRAINS (will only show if present)   [REMOVED] Peripheral IV 01/25/18 Left Wrist-Site Assessment: Clean, dry, & intact  Peripheral IV 01/25/18 Right Forearm-Site Assessment: Clean, dry, & intact     WOUND (if present)   Wound Type:  none   Dressing present Dressing Present : No   Wound Concerns/Notes:  none     PAIN    Pain Assessment    Pain Intensity 1: 0 (01/28/18 1624)              Patient Stated Pain Goal: 0  o Interventions for Pain:  Tylenol  o Intervention effective: no pain med offered  o Time of last intervention: NA   o Reassessment Completed: yes      Last 3 Weights:  Last 3 Recorded Weights in this Encounter    01/25/18 2018   Weight: 63.4 kg (139 lb 12.8 oz)     Weight change:      INTAKE/OUPUT    Current Shift:      Last three shifts: 01/27 0701 - 01/28 1900  In: -   Out: 750 [Urine:750]     LAB RESULTS     Recent Labs      01/28/18   0325  01/27/18   0410  01/26/18   1115   WBC  4.0*  3.6*  3.6*   HGB  10.1*  10.4*  10.4*   HCT  32.4*  33.0*  32.7*   PLT  163  160  147        Recent Labs      01/28/18   0325  01/27/18   0410  01/26/18   1115   NA  140  141  140   K  3.6  3.7  3.8   GLU  84  77  84   BUN  13  16  19*   CREA  1.02  0.96  1.14   CA  8.2*  8.4*  8.3*   MG   --    --   1.9       RECOMMENDATIONS AND DISCHARGE PLANNING     1. Pending tests/procedures/ Plan of Care or Other Needs: ? D/c in am     2. Discharge plan for patient and Needs/Barriers: home    3. Estimated Discharge Date: 1/30/2017 Posted on Whiteboard in \[facility] [place] [facility]\"": yes      4. The patient's care plan was reviewed with the oncoming nurse. \"HEALS\" SAFETY CHECK      Fall Risk    Total Score: 3    Safety Measures: Safety Measures: Bed/Chair-Wheels locked, Bed in low position, Call light within reach, Fall prevention (comment), Gripper socks, Side rails X 3    A safety check occurred in the patient's room between off going nurse and oncoming nurse listed above. The safety check included the below items  Area Items   H  High Alert Medications - Verify all high alert medication drips (heparin, PCA, etc.)   E  Equipment - Suction is set up for ALL patients (with yanker)  - Red plugs utilized for all equipment (IV pumps, etc.)  - WOWs wiped down at end of shift.  - Room stocked with oxygen, suction, and other unit-specific supplies   A  Alarms - Bed alarm is set for fall risk patients  - Ensure chair alarm is in place and activated if patient is up in a chair   L  Lines - Check IV for any infiltration  - Oliver bag is empty if patient has a Oliver   - Tubing and IV bags are labeled   S  Safety   - Room is clean, patient is clean, and equipment is clean. - Hallways are clear from equipment besides carts.    - Fall bracelet on for fall risk patients  - Ensure room is clear and free of clutter  - Suction is set up for ALL patients (with yanker)  - Hallways are clear from equipment besides carts.    - Isolation precautions followed, supplies available outside room, sign posted     Carlos Moncada LPN

## 2018-01-29 NOTE — INTERDISCIPLINARY ROUNDS
Interdisciplinary Round Note   Patient Information:   Jadyn Ruby   [birth year]   Reason for Admission: Pneumonia  Community acquired pneumonia   Attending Provider:   Bety Mandel MD  Primary Care Physician:       Mary Montes MD       [phone]   Estimated discharge date:  TBD   Hospital day: 4  [unfilled]  3d 9h  RRAT Score: Low Risk            8       Total Score        3 Has Seen PCP in Last 6 Months (Yes=3, No=0)    5 Pt. Coverage (Medicare=5 , Medicaid, or Self-Pay=4)        Criteria that do not apply:    . Living with Significant Other. Assisted Living. LTAC. SNF. or   Rehab    Patient Length of Stay (>5 days = 3)    IP Visits Last 12 Months (1-3=4, 4=9, >4=11)    Charlson Comorbidity Score (Age + Comorbid Conditions)            No       Not needed      Lines, Drains, & Airways  None       IV Antibiotics:    Current Antimicrobial Therapy (168h ago through future)    Ordered     Start Stop    01/28/18 1050  ampicillin-sulbactam (UNASYN) 3 g in 0.9% sodium chloride (MBP/ADV) 100 mL MBP  3 g,   IntraVENous,   EVERY 6 HOURS      01/28/18 1200 --        GI Prophylaxis: GI Prophylaxis: no   Type:       Recent Glucose Results: No results found for: GLU, GLUPOC, GLUCPOC   Activity Level:   Activity Level: Up ad shanon    Needs assistance with ADLs: no       Goals for Today: antibiotics and ambulation   Recommendations:   Discharge Disposition: TBD, pending progress  CM  Care Management involvement for home health follow up for:  assistance with ADL's    Needs for Discharge: none IDR Team: MD, RN,CM,RD  Recommendations from IDR team:     Other Notes:

## 2018-01-29 NOTE — DISCHARGE SUMMARY
Discharge Summary    Patient: Shanthi Lynn MRN: [id number]  CSN: [id number]    YOB: 1927  Age: 80 y.o. Sex: male    DOA: 1/25/2018 LOS:  LOS: 4 days   Discharge Date:      Admission Diagnoses: Pneumonia  Community acquired pneumonia    Discharge Diagnoses:    Problem List as of 1/29/2018  Date Reviewed: 8/31/2017          Codes Class Noted - Resolved    Pneumonia ICD-10-CM: J18.9  ICD-9-CM: 486  1/25/2018 - Present        Community acquired pneumonia ICD-10-CM: J18.9  ICD-9-CM: 486  1/25/2018 - Present        Chronic low back pain without sciatica (Chronic) ICD-10-CM: M54.5, G89.29  ICD-9-CM: 724.2, 338.29  9/8/2016 - Present        Chronic low back pain ICD-10-CM: M54.5, G89.29  ICD-9-CM: 724.2, 338.29  11/22/2014 - Present    Overview Signed 11/22/2014  8:58 AM by Ria Kraus     Acute exacerbation of chornic lower back pain. 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 Pt had decreased po intake and appetite. He presented to his PCP the day prior and was started on po abx, but was not able to swallow the pills without gagging and subsequently worsened. Pt subsequently presented to the ED and was noted to have RLL infiltrate on his cxr. He was flu negative and, per chart review, had mild bump in his troponin with ekg showing ST with RBBB.  Patient was surrounded by supportive family. Patient grabbed his abdomen during our talk and endorsed epigastric pain. Pt's son stated he had not eaten in 6 days and has constipation related to chronic use of opioids. Pt stated he thought it was \"hunger pains. \"    Discharge Condition: Good    PHYSICAL EXAM at discharge. Visit Vitals    /78 (BP 1 Location: Left arm, BP Patient Position: At rest)    Pulse 78    Temp 97.3 °F (36.3 °C)    Resp 18    Ht 6' 1\" (1.854 m)    Wt 63.4 kg (139 lb 12.8 oz)    SpO2 97%    BMI 18.44 kg/m2     General: Awake, alert, NAD. HeeNt: ncat. Perrl. Missing teeth. Carious right upper molar    Cardiovascular:  RRR  Pulmonary:  CTA b.l. No crackles, no rales, no wheeze  GI: soft nt nd NABS  Extremities:  No edema or cyanosis  Neuro no focal deficit  Skin: no rash    Hospital Course:   1. aspiration pneumonia - received unasyn in house. Per ID recs, po amoxicillin/clavulanate till 2/3/18  2. 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 3. Dysphagia: speech consult appreciated, pt did well  - rec Reg solid with thin liquids, Aspiration precautions. 4. Elevated troponin, likely demand ischemia - Lovenox was discontinued. 5. Elevated lipase with abd pain - abd US with no acute concerns, abd pain improved per pt, tolerating diet. Cont. Bowel regimen. 6. Chronic constipation in the setting of opioid use - discharge with bowel regimen  7. CKD III: stable over hospital course. 6. Advanced age. 9. Dyspnea on exertion only per patient: O2 sat at % at room air. Echo with EF of 55-60%  9. underweight Body mass index is 18.44 kg/(m^2). See #12. 10. Severe Protein Calorie Malnutrition: nutritionist followed. Supplement, on multivit. 11. Bacteriemia : anaerobic bottle + PEPTOSTREPTOCOCCUS NURYS - discharge on augmentin per ID recs. Lactobacillus for 8 days. Repeat blood cx ngtd. Dental extraction as soon as feasible - discussed w/ patient and son.     12. Disposition: full code. Discharge to home with . Patient and son at bedside agree; extensive discussion held, all questions answered to the best of my ability. Consults: Pulmonary/Critical Care Dr. Linda Rodrigues  ID Dr. Chucho Dupree Diagnostic Studies: labs:   Recent Results (from the past 24 hour(s))   CBC WITH AUTOMATED DIFF    Collection Time: 01/29/18  2:56 AM   Result Value Ref Range    WBC 4.8 4.6 - 13.2 K/uL    RBC 3.94 (L) 4.70 - 5.50 M/uL    HGB 10.3 (L) 13.0 - 16.0 g/dL    HCT 32.6 (L) 36.0 - 48.0 %    MCV 82.7 74.0 - 97.0 FL    MCH 26.1 24.0 - 34.0 PG    MCHC 31.6 31.0 - 37.0 g/dL    RDW 13.3 11.6 - 14.5 %    PLATELET 370 358 - 107 K/uL    MPV 11.2 9.2 - 11.8 FL    NEUTROPHILS 48 40 - 73 %    LYMPHOCYTES 35 21 - 52 %    MONOCYTES 12 (H) 3 - 10 %    EOSINOPHILS 5 0 - 5 %    BASOPHILS 0 0 - 2 %    ABS. NEUTROPHILS 2.3 1.8 - 8.0 K/UL    ABS. LYMPHOCYTES 1.7 0.9 - 3.6 K/UL    ABS. MONOCYTES 0.6 0.05 - 1.2 K/UL    ABS. EOSINOPHILS 0.3 0.0 - 0.4 K/UL    ABS.  BASOPHILS 0.0 0.0 - 0.1 K/UL    DF AUTOMATED     METABOLIC PANEL, BASIC    Collection Time: 01/29/18  2:56 AM   Result Value Ref Range    Sodium 140 136 - 145 mmol/L    Potassium 3.7 3.5 - 5.5 mmol/L    Chloride 107 100 - 108 mmol/L    CO2 26 21 - 32 mmol/L    Anion gap 7 3.0 - 18 mmol/L    Glucose 81 74 - 99 mg/dL    BUN 11 7.0 - 18 MG/DL    Creatinine 1.02 0.6 - 1.3 MG/DL    BUN/Creatinine ratio 11 (L) 12 - 20      GFR est AA >60 >60 ml/min/1.73m2    GFR est non-AA >60 >60 ml/min/1.73m2    Calcium 8.4 (L) 8.5 - 10.1 MG/DL     All Micro Results     Procedure Component Value Units Date/Time    CULTURE, BLOOD [[id number]] Collected:  01/27/18 1028    Order Status:  Completed Specimen:  Whole Blood from Blood Updated:  02/02/18 0745     Special Requests: NO SPECIAL REQUESTS        Culture result: NO GROWTH 6 DAYS       CULTURE, BLOOD [[id number]] Collected:  01/27/18 1017    Order Status:  Completed Specimen:  Whole Blood from Blood Updated:  02/02/18 0745     Special Requests: NO SPECIAL REQUESTS        Culture result: NO GROWTH 6 DAYS       CULTURE, BLOOD [[id number]] Collected:  01/25/18 1455    Order Status:  Completed Specimen:  Blood from Blood Updated:  01/31/18 0827     Special Requests: NO SPECIAL REQUESTS        Culture result: NO GROWTH 6 DAYS       CULTURE, BLOOD [[id number]]  (Abnormal) Collected:  01/25/18 1510    Order Status:  Completed Specimen:  Blood from Blood Updated:  01/29/18 1242     Special Requests: NO SPECIAL REQUESTS        GRAM STAIN         ANAEROBIC BOTTLE GRAM POSITIVE COCCI IN GROUPS              CALLED TO AND CORRECTLY REPEATED BY: DANNY,RN [address] [id number] TO [facility]     Culture result:         ANAEROBIC BOTTLE PEPTOSTREPTOCOCCUS NURYS (A)    INFLUENZA A & B AG (RAPID TEST) [[id number]] Collected:  01/25/18 1250    Order Status:  Completed Specimen:  Nasopharyngeal from Nasal washing Updated:  01/25/18 1320     Influenza A Antigen NEGATIVE          A negative result does not exclude influenza virus infection, seasonal or H1N1 due to suboptimal sensitivity. If influenza is circulating in your community, a diagnosis of influenza should be considered based on a patients clinical presentation and empiric antiviral treatment should be considered, if indicated. Influenza B Antigen NEGATIVE            IMAGING  US Results (most recent):    Results from Hospital Encounter encounter on 01/25/18   US ABD LTD   Narrative ABDOMEN ULTRASOUND LIMITED    CPT CODE: 93973    HISTORY: Abdominal pain, dysphagia, nonproductive cough, elevated lipase,  chronic kidney disease stage III. TECHNIQUE: Selected images from limited abdominal ultrasound provided for  interpretation. COMPARISON: None. FINDINGS:     Portions of the abdominal aorta are visualized and are unremarkable. The head and body of the pancreas are grossly normal in appearance. There is no  ductal dilatation. The tail is not well visualized due to bowel gas.     The liver is  13.7 cm with homogeneous echotexture without mass in the  visualized portions. There is no biliary ductal dilatation. The main portal  vein is patent with proper directional flow. The gallbladder is normal in  configuration without wall thickening or calculus. The common bile duct  measures approximately 0. 4 cm in greatest diameter. The right kidney measures 9.4 x 8.7 x 3.3 cm and demonstrates subtle increased  echogenicity of the cortex. Simple cyst upper pole measures 1.1 cm. .  No renal  calculi are seen. No free fluid is identified. Impression IMPRESSION:    Subtle increased echogenicity of the right kidney consistent with medical renal  disease. Tiny simple right renal cyst.   Limited visualization of the pancreas. CT Results (most recent):    Results from Hospital Encounter encounter on 01/25/18   CTA CHEST W OR W WO CONT   Narrative INDICATION:  Pneumonia, shortness of breath    COMPARISON: Chest radiograph 1/25/2018. TECHNIQUE:   Precontrast  images were obtained to localize the volume for acquisition. Multislice helical CT arteriography was performed from the diaphragm to the  thoracic inlet during uneventful rapid bolus intravenous administration of 74 cc  Isovue-370. Lung and soft tissue windows were generated. 3-D MIP Post processing  was performed and coronal reformatted images were also generated. All CT scans  at this facility are performed using doze optimization technique as appropriate  to a performed exam, to include automated exposure control, adjustment of the mA  and/or KV according to patient size (including appropriate matching for site  specific examinations), or use of iterative reconstruction technique. FINDINGS:    The pulmonary arteries are well enhanced and no pulmonary emboli are identified. 5 mm left lung base nodule (36). 2 mm lung nodule left upper lobe (16).  Small  amount of groundglass opacity right greater than left lung base as well as right  middle lobe. While chronic change may be considered, please clinically  clinically for atelectasis or developing infiltrate. Calcified pleural thickening bilaterally. No pleural effusion. Ascending aorta  measures 3.9 cm. Descending aorta at the diaphragm measures 3.6 cm. Subcentimeter by short axis mediastinal lymph nodes, nonspecific. Visualized  upper abdomen demonstrates no acute process. Limited evaluation of the kidneys  secondary to suboptimal contrast.         Impression IMPRESSION:     No evidence for pulmonary embolism. Small amount of scattered groundglass opacities as above. Please correlate  clinically for atelectasis or infiltrate. -2-5 mm lung nodules. Follow-up as clinically warranted as per Fleischner  criteria. -Calcified pleural plaques again noted. XR Results (most recent):    Results from Hospital Encounter encounter on 01/25/18   XR CHEST SNGL V   Narrative Portable chest    History: Bronchitis. Worsening shortness of breath. Dehydration. Comparison: Chest and thoracic spine radiographs 11/18/2007, 11/30/2009,  12/13/2012 and 4/15/2013; CT abdomen pelvis 7/6/2014    Findings: The cardiomediastinal silhouette is within normal limits. The pulmonary  vasculature is unremarkable. Chronic biapical pleural thickening. Dense nodules  scattered throughout the lungs are more prominent than prior exams, particularly  within the right midlung. Dense nodules in the left hemithorax are similar to  2007 and at least in part represent calcified pleural plaques demonstrated on  lung bases of prior abdomen pelvis CT. Nodules in the right hemithorax appear  more dense, confluent and more numerous than prior exams and may represent  superimposed infection. Bronchial wall thickening also demonstrated in the  infrahilar right lung. No pleural effusion or pneumothorax. No acute osseous  abnormality.          Impression Impression:    More numerous, dense and confluent nodules and bronchial wall thickening in the  right hemithorax favored to represent superimposed infection probably on  asbestos related pleural disease. Recommend follow-up to resolution. Thank you for this referral.      Transthoracic Echocardiogram 28-Jan-2018  Left ventricle: Size was normal. Systolic function was normal by visual assessment. Ejection fraction was estimated in the range of 55 % to 60 %. No obvious wall motion abnormalities identified in the views obtained. Wall thickness was at the upper limits of normal.    EKG Results     Procedure 720 Value Units Date/Time    EKG, 12 LEAD, INITIAL [[id number]] Collected:  01/25/18 1104    Order Status:  Completed Updated:  01/25/18 1623     Ventricular Rate 102 BPM      Atrial Rate 102 BPM      P-R Interval 138 ms      QRS Duration 124 ms      Q-T Interval 378 ms      QTC Calculation (Bezet) 492 ms      Calculated P Axis 78 degrees      Calculated R Axis -17 degrees      Calculated T Axis 53 degrees      Diagnosis --     Sinus tachycardia  Right bundle branch block  Abnormal ECG  When compared with ECG of 23-MAR-2015 11:58,  No significant change was found  Confirmed by Manjeet Snowden MD, Regulo Robison ([id number]) on 1/25/2018 4:23:49 PM      EKG, 12 LEAD, INITIAL [[id number]]     Order Status:  Canceled         Discharge Medications:     Current Discharge Medication List      START taking these medications    Details   guaiFENesin (ROBITUSSIN) 100 mg/5 mL liquid Take 5 mL by mouth every four (4) hours as needed for Cough. Qty: 118 mL, Refills: 0      therapeutic multivitamin (THERAGRAN) tablet Take 1 Tab by mouth daily. Qty: 30 Tab, Refills: 11      amoxicillin-clavulanate (AUGMENTIN) 500-125 mg per tablet Take 1 Tab by mouth every twelve (12) hours for 5 days. Qty: 10 Tab, Refills: 0      lactobacillus-acidophilus (LACTINEX) 100 million cell grpk Take 1 Packet by mouth two (2) times a day for 8 days.   Qty: 16 Packet, Refills: 0      bisacodyl (DULCOLAX) 5 mg EC tablet Take 1 Tab by mouth daily as needed for Constipation. Qty: 25 Tab, Refills: 2      senna-docusate (PERICOLACE) 8.6-50 mg per tablet Take 2 Tabs by mouth daily. Qty: 60 Tab, Refills: 2         CONTINUE these medications which have NOT CHANGED    Details   gabapentin (NEURONTIN) 300 mg capsule Take 300 mg by mouth three (3) times daily. latanoprost (XALATAN) 0.005 % ophthalmic solution Administer 1 Drop to both eyes nightly. loteprednol etabonate (LOTEMAX) 0.5 % drpg Administer 1 Each to both eyes daily. (1) drop in both eyes every day      bromfenac (PROLENSA) 0.07 % ophthalmic solution Administer 1 Drop to both eyes daily. polyvinyl alcohol-povidon,PF, (REFRESH CLASSIC, PF,) 1.4-0.6 % ophthalmic solution Administer 1-2 Drops to both eyes as needed. fentaNYL (DURAGESIC) 75 mcg/hr Apply 1 patch every 3 days for chronic pain  Qty: 10 Patch, Refills: 0    Associated Diagnoses: Chronic bilateral low back pain without sciatica      HYDROcodone-acetaminophen (NORCO)  mg tablet Take 1-2 tabs po daily prn chronic pain  Qty: 45 Tab, Refills: 0    Associated Diagnoses: Chronic bilateral low back pain without sciatica      timolol (TIMOPTIC) 0.5 % ophthalmic solution Administer 1 Drop to both eyes two (2) times a day. Activity: PT/OT per Home Health    Diet: Cardiac Diet    Wound Care: None needed    Follow-up:   1. Dr. Aislinn Barnett 7 - 10 days. 2. Dr. Marce Lucio 4 weeks. 3. Dentist next available appointment for dental extraction.     Minutes spent on discharge: greater than 30

## 2018-01-29 NOTE — ROUTINE PROCESS
Bedside and Verbal shift change report given to Jackelyn Livingston RN (oncoming nurse) by Verner Proctor, RN (offgoing nurse). Report included the following information SBAR, Kardex, MAR and Recent Results. SITUATION:  Code Status: Full Code  Reason for Admission: Pneumonia  Community acquired pneumonia  Hospital day: 4  Problem List:       Hospital Problems  Date Reviewed: 8/31/2017          Codes Class Noted POA    Pneumonia ICD-10-CM: J18.9  ICD-9-CM: 728  1/25/2018 Unknown        Community acquired pneumonia ICD-10-CM: J18.9  ICD-9-CM: 478  1/25/2018 Unknown              BACKGROUND:   Past Medical History:   Past Medical History:   Diagnosis Date    Arthritis     Back pain, chronic       Patient taking anticoagulants yes    Patient has a defibrillator: no    History of shots YES for example, flu, pneumonia, tetanus   Isolation History NO for example, MRSA, CDiff    ASSESSMENT:  Changes in Assessment Throughout Shift: NONE  Significant Changes in 24 hours (for example, RR/code, fall)  Patient has Central Line: no Reasons if yes:   Patient has Oliver Cath: no Reasons if yes:     Mobility Issues  PT  IV Patency  OR Checklist  Pending Tests    Last Vitals:  Vitals w/ MEWS Score (last day)     Date/Time MEWS Score Pulse Resp Temp BP Level of Consciousness SpO2    01/29/18 0359 1 80 18 97.3 °F (36.3 °C) 127/77 Alert 97 %    01/29/18 0020 1 72 18 97.2 °F (36.2 °C) 132/83 Alert 97 %    01/28/18 2055 -- 70 20 97.9 °F (36.6 °C) 130/80 -- 95 %    01/28/18 1624 1 89 20 96.4 °F (35.8 °C) 109/78 Alert 100 %    01/28/18 1125 1 67 18 97.8 °F (36.6 °C) 116/71 Alert 98 %    01/28/18 0707 1 69 20 97.8 °F (36.6 °C) 121/77 Alert 97 %    01/28/18 0329 1 67 16 97.6 °F (36.4 °C) 119/76 Alert 97 %            PAIN    Pain Assessment    Pain Intensity 1: 0 (01/29/18 0400)              Patient Stated Pain Goal: 0  Intervention effective: yes  Time of last intervention: No stated pain Reassessment Completed: yes   Other actions taken for pain: NONE    Last 3 Weights:  Last 3 Recorded Weights in this Encounter    01/25/18 2018   Weight: 63.4 kg (139 lb 12.8 oz)   Weight change:     INTAKE/OUPUT    Current Shift:      Last three shifts: 01/27 1901 - 01/29 0700  In: -   Out: 750 [Urine:750]    RECOMMENDATIONS AND DISCHARGE PLANNING  Patient needs and requests: NONE    Pending tests/procedures: NONE     Discharge plan for patient: TBD    Discharge planning Needs or Barriers: NONE    Estimated Discharge Date: TBD Posted on Whiteboard in Patients Room: yes       \"HEALS\" SAFETY CHECK  A safety check occurred in the patient's room between off going nurse and oncoming nurse listed above. The safety check included the below items:    H  High Alert Medications Verify all high alert medication drips (heparin, PCA, etc.)  E  Equipment Suction is set up for ALL patients (with ren)  Red plugs utilized for all equipment (IV pumps, etc.)  WOWs wiped down at end of shift. Room stocked with oxygen, suction, and other unit-specific supplies  A  Alarms Bed alarm is set for fall risk patients  Ensure chair alarm is in place and activated if patient is up in a chair  L  Lines Check IV for any infiltration  Oliver bag is empty if patient has a Oliver   Tubing and IV bags are labeled  S  Safety  Room is clean, patient is clean, and equipment is clean. Hallways are clear from equipment besides carts. Fall bracelet on for fall risk patients  Ensure room is clear and free of clutter  Suction is set up for ALL patients (with ren)  Hallways are clear from equipment besides carts.    Isolation precautions followed, supplies available outside room, sign posted    Alina Barnhart RN

## 2018-01-29 NOTE — DISCHARGE INSTRUCTIONS
Pneumonia: Care Instructions  Your Care Instructions    Pneumonia is an infection of the lungs. Most cases are caused by infections from bacteria or viruses. Pneumonia may be mild or very severe. If it is caused by bacteria, you will be treated with antibiotics. It may take a few weeks to a few months to recover fully from pneumonia, depending on how sick you were and whether your overall health is good. Follow-up care is a key part of your treatment and safety. Be sure to make and go to all appointments, and call your doctor if you are having problems. It's also a good idea to know your test results and keep a list of the medicines you take. How can you care for yourself at home? · Take your antibiotics exactly as directed. Do not stop taking the medicine just because you are feeling better. You need to take the full course of antibiotics. · Take your medicines exactly as prescribed. Call your doctor if you think you are having a problem with your medicine. · Get plenty of rest and sleep. You may feel weak and tired for a while, but your energy level will improve with time. · To prevent dehydration, drink plenty of fluids, enough so that your urine is light yellow or clear like water. Choose water and other caffeine-free clear liquids until you feel better. If you have kidney, heart, or liver disease and have to limit fluids, talk with your doctor before you increase the amount of fluids you drink. · Take care of your cough so you can rest. A cough that brings up mucus from your lungs is common with pneumonia. It is one way your body gets rid of the infection. But if coughing keeps you from resting or causes severe fatigue and chest-wall pain, talk to your doctor. He or she may suggest that you take a medicine to reduce the cough. · Use a vaporizer or humidifier to add moisture to your bedroom. Follow the directions for cleaning the machine. · Do not smoke or allow others to smoke around you.  Smoke will make your cough last longer. If you need help quitting, talk to your doctor about stop-smoking programs and medicines. These can increase your chances of quitting for good. · Take an over-the-counter pain medicine, such as acetaminophen (Tylenol), ibuprofen (Advil, Motrin), or naproxen (Aleve). Read and follow all instructions on the label. · Do not take two or more pain medicines at the same time unless the doctor told you to. Many pain medicines have acetaminophen, which is Tylenol. Too much acetaminophen (Tylenol) can be harmful. · If you were given a spirometer to measure how well your lungs are working, use it as instructed. This can help your doctor tell how your recovery is going. · To prevent pneumonia in the future, talk to your doctor about getting a flu vaccine (once a year) and a pneumococcal vaccine (one time only for most people). When should you call for help? Call 911 anytime you think you may need emergency care. For example, call if:  ? · You have severe trouble breathing. ?Call your doctor now or seek immediate medical care if:  ? · You cough up dark brown or bloody mucus (sputum). ? · You have new or worse trouble breathing. ? · You are dizzy or lightheaded, or you feel like you may faint. ? Watch closely for changes in your health, and be sure to contact your doctor if:  ? · You have a new or higher fever. ? · You are coughing more deeply or more often. ? · You are not getting better after 2 days (48 hours). ? · You do not get better as expected. Where can you learn more? Go to http://rom-jose daniel.info/. Enter 01.84.63.10.33 in the search box to learn more about \"Pneumonia: Care Instructions. \"  Current as of: May 12, 2017       Learning About COPD and How to Prevent Lung Infections  How do lung infections affect COPD? Lung infections like pneumonia and acute bronchitis are common causes of COPD flare-ups.  And people who have COPD are more likely to get these lung infections, especially if they smoke. When you have COPD, it is important to know the symptoms of pneumonia and acute bronchitis and call your doctor if you have them. Symptoms include:  · A cough that brings up more mucus than usual.  · Fever. · Shortness of breath. What can you do to prevent these infections? Stay healthy  · Get a flu shot every year. · Get a pneumococcal vaccine shot. If you have had one before, ask your doctor whether you need another dose. Two different types of pneumococcal vaccines are recommended for people ages 72 and older. · If you must be around people with colds or the flu, wash your hands often. · Do not smoke. This is the most important step you can take to prevent more damage to your lungs. If you need help quitting, talk to your doctor about stop-smoking programs and medicines. These can increase your chances of quitting for good. · Avoid secondhand smoke, air pollution, and high altitudes. Also avoid cold, dry air and hot, humid air. Stay at home with your windows closed when air pollution is bad. Exercise and eat well  · If your doctor recommends it, get more exercise. Walking is a good choice. Bit by bit, increase the amount you walk every day. Try for at least 30 minutes on most days of the week. · Eat regular, well-balanced meals. Eating right keeps your energy levels up and helps your body fight infection. · Get plenty of rest and sleep. Follow-up care is a key part of your treatment and safety. Be sure to make and go to all appointments, and call your doctor if you are having problems. It's also a good idea to know your test results and keep a list of the medicines you take. Where can you learn more? Go to http://rom-jose daniel.info/. Enter M616 in the search box to learn more about \"Learning About COPD and How to Prevent Lung Infections. \"  Current as of: May 12, 2017  Content Version: 11.4  © 5946-4660 [employer]. Care instructions adapted under license by [employer] (which disclaims liability or warranty for this information). If you have questions about a medical condition or this instruction, always ask your healthcare professional. [facility] [address] any warranty or liability for your use of this information. Patient armband removed and shredded  Nursing Instructions: Contact your primary care physician, if you have increasing shortness of breath, with or without exertion. Nausea or vomiting where you cannot keep food, drink or medicine down. Chest pain that does not go away with rest or   Content Version: 11.4  © 2561-4925 [employer]. Care instructions adapted under license by [employer] (which disclaims liability or warranty for this information). If you have questions about a medical condition or this instruction, always ask your healthcare professional. [facility] [address] any warranty or liability for your use of this information.

## 2018-01-29 NOTE — HOME CARE
[facility] received referral for SN/disease education and management/med management - PT/OT - HSE. Patient has discharged to home - this nurse verified address & contact information - lives with his partner, Kinga Nelson and son lives close by. DME: per patient's son, Roshni Boss, the patient uses a straight cane - no other DME. Referral called to central intake for completion and visit scheduling - TOBI Mackey LPN

## 2018-01-29 NOTE — PROGRESS NOTES
[facility]  [facility]    Patient: Teo Blair Age: 80 y.o. : 3/14/1927 MR#: [id number] SSN: [id number]  Date: 2018     Subjective:     Pt reports some improvement in abd pain, and tolerated his diet. Denies any chest pain, SOB, N/V/D/C, reflux or heartburn. Had BM this am.     Assessment/Plan:   1. Possible Aspiration Pneumonia with acute bronchitis - no fevers, cont unasyn. Symptomatic mgt. CTA with no PE. 2. 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 3. Dysphagia: speech consult appreciated, pt did well  - rec Reg solid with thin liquids, Aspiration precautions. 4. Elevated troponin, likely demand ischemia - flat effect, Lovenox was discontinued. 5. Elevated lipase with abd pain - abd US with no acute concerns, abd pain improved per pt, tolerating diet. Cont. Bowel regimen. 6. Anorexia:  nutrition consulted.   7. CKD III: cr wnl.  8. Advance age. 9. Dyspnea on exertion only per patient: O2 sat at % at room air. Echo with EF of 55-60% wnwa. 9. Goals of care: will like Full code per pt.  referral to help with AMD.  10. Underweight, poor PO intake: nutrition consult. 11. Bacteriemia: 1/2 BOTTLE GRAM POSITIVE COCCI IN GROUPS, cont. unasyn and follow sensitivity, follow repeat blood cx ngtd, ID consult to follow. 12. Severe Protein Calorie Malnutrition: nutrition consult, cont. Supplement, on multivit. 13. Underweight Body mass index is 18.44 kg/(m^2). 14. Disposition: no discharge rec per PT/OT.  Pt to return home.         Goals of care:   Disposition:  [x]PT/OT ordered   [] Case management referral    Case discussed with:  [x]Patient  []Family  [x]Nursing  []Case Management  DVT Prophylaxis:  []Lovenox  [x]Hep SQ  []SCDs  []Coumadin   []On Heparin gtt    Objective:   VS:   Visit Vitals    /78 (BP 1 Location: Left arm, BP Patient Position: At rest)    Pulse 89    Temp 96.4 °F (35.8 °C)    Resp 20    Ht 6' 1\" (1.854 m)    Wt 63.4 kg (139 lb 12.8 oz)    SpO2 100%    BMI 18.44 kg/m2      Tmax/24hrs: Temp (24hrs), Av.3 °F (36.3 °C), Min:96.4 °F (35.8 °C), Max:97.8 °F (36.6 °C)    Intake/Output Summary (Last 24 hours) at 18 1912  Last data filed at 18 0757   Gross per 24 hour   Intake                0 ml   Output              450 ml   Net             -450 ml       General: Awake, alert, NAD. Cardiovascular:  RRR  Pulmonary:  CTA  GI:  mild Epigastric tenderness, normal BS  Extremities:  No edema or cyanosis    Labs:    Recent Results (from the past 24 hour(s))   CBC WITH AUTOMATED DIFF    Collection Time: 18  3:25 AM   Result Value Ref Range    WBC 4.0 (L) 4.6 - 13.2 K/uL    RBC 3.86 (L) 4.70 - 5.50 M/uL    HGB 10.1 (L) 13.0 - 16.0 g/dL    HCT 32.4 (L) 36.0 - 48.0 %    MCV 83.9 74.0 - 97.0 FL    MCH 26.2 24.0 - 34.0 PG    MCHC 31.2 31.0 - 37.0 g/dL    RDW 13.5 11.6 - 14.5 %    PLATELET 721 483 - 230 K/uL    MPV 11.3 9.2 - 11.8 FL    NEUTROPHILS 18 (L) 42 - 75 %    LYMPHOCYTES 59 (H) 20 - 51 %    MONOCYTES 13 (H) 2 - 9 %    EOSINOPHILS 7 (H) 0 - 5 %    BASOPHILS 1 0 - 3 %    MYELOCYTES 1 (H) 0 %    OTHER CELL 1 (H) 0      ABS. NEUTROPHILS 0.7 (L) 1.8 - 8.0 K/UL    ABS. LYMPHOCYTES 2.4 0.8 - 3.5 K/UL    ABS. MONOCYTES 0.5 0 - 1.0 K/UL    ABS. EOSINOPHILS 0.3 0.0 - 0.4 K/UL    ABS.  BASOPHILS 0.0 0.0 - 0.06 K/UL    DF MANUAL      PLATELET COMMENTS ADEQUATE PLATELETS      RBC COMMENTS NORMOCYTIC, NORMOCHROMIC     METABOLIC PANEL, BASIC    Collection Time: 18  3:25 AM   Result Value Ref Range    Sodium 140 136 - 145 mmol/L    Potassium 3.6 3.5 - 5.5 mmol/L    Chloride 107 100 - 108 mmol/L    CO2 26 21 - 32 mmol/L    Anion gap 7 3.0 - 18 mmol/L    Glucose 84 74 - 99 mg/dL    BUN 13 7.0 - 18 MG/DL    Creatinine 1.02 0.6 - 1.3 MG/DL    BUN/Creatinine ratio 13 12 - 20      GFR est AA >60 >60 ml/min/1.73m2    GFR est non-AA >60 >60 ml/min/1.73m2    Calcium 8.2 (L) 8.5 - 10.1 MG/DL       Signed By: Jerri Castaneda PA-C     January 28, 2018 7:12 PM

## 2018-01-29 NOTE — PROGRESS NOTES
[facility]. Pulmonary, Critical Care, and Sleep Medicine    Initial Patient Consult    Name: Jae Washington MRN: [id number]   : 3/14/1927 Hospital: [facility]   Date: 2018        IMPRESSION/ PLAN:   · Subjective Dyspnea as per chart (patient denies any breathing problems) - Resolved- on room air. Ambulate patient to assess SpO2. ·   · Aspiration PNA:  GGO on CT chest- suspect aspiration given history- On Unasyn. Will defer ABx to ID, but would suggest PO augmentin for discharge to complete 8-10 days    · Asbestos pleural plaques- BENIGN, chronic. Not the cause of current symptoms. NO effusions on CXR. Concern has been raised by radiologist read about lung nodules. Get routine CT chest to evaluate that. On previous abd CT no evidence of asbestosis atleast on lower cuts of lungs  ·   · Pulmonary nodule of CT chest: 2 to 5mm  · Pt will need followup in pulmonary clinic in 8 weeks with followup CT chest                 Subjective:       18   -Pt seen and examined at bedside. No acute events overnight. Denies chest pain, dyspnea, N/V/D.     No Known Allergies      Current Facility-Administered Medications:     [START ON 2018] senna-docusate (PERICOLACE) 8.6-50 mg per tablet 2 Tab, 2 Tab, Oral, DAILY, Belen Jefferson MD    bisacodyl (DULCOLAX) tablet 5 mg, 5 mg, Oral, DAILY PRN, Belen Jefferson MD    ampicillin-sulbactam (UNASYN) 3 g in 0.9% sodium chloride (MBP/ADV) 100 mL MBP, 3 g, IntraVENous, Q6H, Belen Jefferson MD, Last Rate: 100 mL/hr at 18 1255, 3 g at 18 1255    heparin (porcine) injection 5,000 Units, 5,000 Units, SubCUTAneous, Q8H, Isrrael Cooper PA-C, 5,000 Units at 18 0522    bisacodyl (DULCOLAX) tablet 5 mg, 5 mg, Oral, DAILY PRN, Isrrael Cooper PA-C    guaiFENesin (ROBITUSSIN) 100 mg/5 mL oral liquid 100 mg, 100 mg, Oral, Q4H PRN, Minnie Cooper PA-C    therapeutic multivitamin (THERAGRAN) tablet 1 Tab, 1 Tab, Oral, DAILY, Isrrael Cooper PA-C, 1 Tab at 18 0902    fentaNYL (DURAGESIC) 75 mcg/hr patch 1 Patch, 1 Patch, TransDERmal, Q72H, Contreras Baker MD, 1 Patch at 18    timolol (TIMOPTIC) 0.5 % ophthalmic solution 1 Drop, 1 Drop, Both Eyes, BID, Contreras Baker MD, 1 Drop at 18 0800    sodium chloride (NS) flush 5-10 mL, 5-10 mL, IntraVENous, Q8H, Contreras Baker MD, 10 mL at 18 0522    sodium chloride (NS) flush 5-10 mL, 5-10 mL, IntraVENous, PRN, Contreras Baker MD    acetaminophen (TYLENOL) tablet 650 mg, 650 mg, Oral, Q4H PRN, Contreras Baker MD    albuterol-ipratropium (DUO-NEB) 2.5 MG-0.5 MG/3 ML, 3 mL, Nebulization, QID RT, Contreras Baker MD, 3 mL at 18 0902    aspirin chewable tablet 81 mg, 81 mg, Oral, DAILY, Contreras Baker MD, 81 mg at 18 3377         Review of Systems:  Pertinent items are noted in HPI. ROS    Objective:   Vital Signs:    Visit Vitals    /78 (BP 1 Location: Left arm, BP Patient Position: At rest)    Pulse 78    Temp 97.3 °F (36.3 °C)    Resp 18    Ht 6' 1\" (1.854 m)    Wt 63.4 kg (139 lb 12.8 oz)    SpO2 97%    BMI 18.44 kg/m2       O2 Device: Room air       Temp (24hrs), Av.2 °F (36.2 °C), Min:96.4 °F (35.8 °C), Max:97.9 °F (36.6 °C)       Intake/Output:   Last shift:      701 - 1900  In: 320 [P.O.:320]  Out: -   Last 3 shifts: 1901 -  07  In: -   Out: 750 [Urine:750]    Intake/Output Summary (Last 24 hours) at 18 1440  Last data filed at 18 0958   Gross per 24 hour   Intake              320 ml   Output              300 ml   Net               20 ml      Physical Exam:   General:  Alert, pleasant, cooperative, no distress, appears stated age. Head:  Normocephalic, without obvious abnormality, atraumatic. Eyes:  Conjunctivae/corneas clear. ANicteric   Lungs:   Bilateral auscultation CTA b/l- no wheezing, crackles or rhonchi   Heart:  Regular rate and rhythm, S1, S2 normal, no murmur, click, rub or gallop. Abdomen:   Soft, non-tender. Bowel sounds normal. No masses,  No organomegaly. No paradox   Extremities: normal, atraumatic, no cyanosis or edema. Data review:   Labs:  Lab Results   Component Value Date/Time    Sodium 140 01/29/2018 02:56 AM    Potassium 3.7 01/29/2018 02:56 AM    Chloride 107 01/29/2018 02:56 AM    CO2 26 01/29/2018 02:56 AM    Anion gap 7 01/29/2018 02:56 AM    Glucose 81 01/29/2018 02:56 AM    BUN 11 01/29/2018 02:56 AM    Creatinine 1.02 01/29/2018 02:56 AM    BUN/Creatinine ratio 11 01/29/2018 02:56 AM    GFR est AA >60 01/29/2018 02:56 AM    GFR est non-AA >60 01/29/2018 02:56 AM    Calcium 8.4 01/29/2018 02:56 AM     Lab Results   Component Value Date/Time    WBC 4.8 01/29/2018 02:56 AM    HGB 10.3 01/29/2018 02:56 AM    HCT 32.6 01/29/2018 02:56 AM    PLATELET 228 69/02/1379 02:56 AM    MCV 82.7 01/29/2018 02:56 AM     Lab Results   Component Value Date/Time    TSH 1.17 02/28/2017 10:44 AM    TSH 1.82 09/22/2016 12:20 PM    TSH 1.87 04/19/2013 04:00 AM             Imaging:  CT Chest:  1/26/18    IMPRESSION:      No evidence for pulmonary embolism.     Small amount of scattered groundglass opacities as above. Please correlate  clinically for atelectasis or infiltrate. -2-5 mm lung nodules. Follow-up as clinically warranted as per Fleischner  criteria. -Calcified pleural plaques again noted.          Lilian Roberts MD/MPH     Pulmonary, Critical Care Medicine  [facility]

## 2018-01-29 NOTE — ROUTINE PROCESS
Bedside and Verbal shift change report given by Darlene Jeter RN (offgoing nurse). Report included the following information SBAR, Kardex, MAR and Recent Results. Patient resting in bed. Denies pain or needs.   Call bell in reach.       [id number] Patient discharged home

## 2018-01-31 ENCOUNTER — HOME CARE VISIT (OUTPATIENT)
Dept: SCHEDULING | Facility: HOME HEALTH | Age: 83
End: 2018-01-31
Payer: MEDICARE

## 2018-01-31 ENCOUNTER — TELEPHONE (OUTPATIENT)
Dept: CASE MANAGEMENT | Age: 83
End: 2018-01-31

## 2018-01-31 ENCOUNTER — HOME CARE VISIT (OUTPATIENT)
Dept: HOME HEALTH SERVICES | Facility: HOME HEALTH | Age: 83
End: 2018-01-31

## 2018-01-31 LAB
BACTERIA SPEC CULT: NORMAL
SERVICE CMNT-IMP: NORMAL

## 2018-01-31 PROCEDURE — G0299 HHS/HOSPICE OF RN EA 15 MIN: HCPCS

## 2018-01-31 PROCEDURE — 400013 HH SOC

## 2018-01-31 PROCEDURE — 3331090002 HH PPS REVENUE DEBIT

## 2018-01-31 PROCEDURE — 3331090001 HH PPS REVENUE CREDIT

## 2018-01-31 NOTE — TELEPHONE ENCOUNTER
Spoke with Mr. Leonidas Fregoso, he states that he is doing pretty good. He was able to get his prescriptions filled and his follow up appointment is scheduled. The home health nurse was there today. No further questions or concerns for me at this time.

## 2018-02-01 VITALS
HEART RATE: 82 BPM | BODY MASS INDEX: 18.42 KG/M2 | TEMPERATURE: 98.3 F | DIASTOLIC BLOOD PRESSURE: 78 MMHG | WEIGHT: 139 LBS | HEIGHT: 73 IN | RESPIRATION RATE: 18 BRPM | SYSTOLIC BLOOD PRESSURE: 122 MMHG | OXYGEN SATURATION: 97 %

## 2018-02-01 PROCEDURE — 3331090002 HH PPS REVENUE DEBIT

## 2018-02-01 PROCEDURE — 3331090001 HH PPS REVENUE CREDIT

## 2018-02-02 ENCOUNTER — HOME CARE VISIT (OUTPATIENT)
Dept: HOME HEALTH SERVICES | Facility: HOME HEALTH | Age: 83
End: 2018-02-02
Payer: MEDICARE

## 2018-02-02 LAB
BACTERIA SPEC CULT: NORMAL
BACTERIA SPEC CULT: NORMAL
SERVICE CMNT-IMP: NORMAL
SERVICE CMNT-IMP: NORMAL

## 2018-02-02 PROCEDURE — 3331090001 HH PPS REVENUE CREDIT

## 2018-02-02 PROCEDURE — 3331090002 HH PPS REVENUE DEBIT

## 2018-02-03 PROCEDURE — 3331090001 HH PPS REVENUE CREDIT

## 2018-02-03 PROCEDURE — 3331090002 HH PPS REVENUE DEBIT

## 2018-02-04 PROCEDURE — 3331090002 HH PPS REVENUE DEBIT

## 2018-02-04 PROCEDURE — 3331090001 HH PPS REVENUE CREDIT

## 2018-02-05 ENCOUNTER — HOME CARE VISIT (OUTPATIENT)
Dept: SCHEDULING | Facility: HOME HEALTH | Age: 83
End: 2018-02-05
Payer: MEDICARE

## 2018-02-05 ENCOUNTER — HOME CARE VISIT (OUTPATIENT)
Dept: HOME HEALTH SERVICES | Facility: HOME HEALTH | Age: 83
End: 2018-02-05
Payer: MEDICARE

## 2018-02-05 VITALS
HEIGHT: 73 IN | HEART RATE: 79 BPM | BODY MASS INDEX: 20.15 KG/M2 | OXYGEN SATURATION: 98 % | SYSTOLIC BLOOD PRESSURE: 130 MMHG | DIASTOLIC BLOOD PRESSURE: 80 MMHG | WEIGHT: 152 LBS | RESPIRATION RATE: 18 BRPM | TEMPERATURE: 96.5 F

## 2018-02-05 PROCEDURE — 3331090001 HH PPS REVENUE CREDIT

## 2018-02-05 PROCEDURE — 3331090002 HH PPS REVENUE DEBIT

## 2018-02-05 PROCEDURE — G0151 HHCP-SERV OF PT,EA 15 MIN: HCPCS

## 2018-02-06 ENCOUNTER — HOME CARE VISIT (OUTPATIENT)
Dept: HOME HEALTH SERVICES | Facility: HOME HEALTH | Age: 83
End: 2018-02-06
Payer: MEDICARE

## 2018-02-06 PROCEDURE — 3331090002 HH PPS REVENUE DEBIT

## 2018-02-06 PROCEDURE — 3331090001 HH PPS REVENUE CREDIT

## 2018-02-06 PROCEDURE — G0152 HHCP-SERV OF OT,EA 15 MIN: HCPCS

## 2018-02-07 PROCEDURE — 3331090001 HH PPS REVENUE CREDIT

## 2018-02-07 PROCEDURE — 3331090002 HH PPS REVENUE DEBIT

## 2018-02-08 ENCOUNTER — HOME CARE VISIT (OUTPATIENT)
Dept: SCHEDULING | Facility: HOME HEALTH | Age: 83
End: 2018-02-08
Payer: MEDICARE

## 2018-02-08 PROCEDURE — G0299 HHS/HOSPICE OF RN EA 15 MIN: HCPCS

## 2018-02-08 PROCEDURE — 3331090002 HH PPS REVENUE DEBIT

## 2018-02-08 PROCEDURE — 3331090001 HH PPS REVENUE CREDIT

## 2018-02-09 PROCEDURE — 3331090002 HH PPS REVENUE DEBIT

## 2018-02-09 PROCEDURE — 3331090001 HH PPS REVENUE CREDIT

## 2018-02-10 VITALS
SYSTOLIC BLOOD PRESSURE: 92 MMHG | HEART RATE: 85 BPM | TEMPERATURE: 98.3 F | DIASTOLIC BLOOD PRESSURE: 70 MMHG | OXYGEN SATURATION: 98 % | RESPIRATION RATE: 20 BRPM

## 2018-02-10 PROCEDURE — 3331090001 HH PPS REVENUE CREDIT

## 2018-02-10 PROCEDURE — 3331090002 HH PPS REVENUE DEBIT

## 2018-02-11 PROCEDURE — 3331090001 HH PPS REVENUE CREDIT

## 2018-02-11 PROCEDURE — 3331090002 HH PPS REVENUE DEBIT

## 2018-02-12 ENCOUNTER — HOME CARE VISIT (OUTPATIENT)
Dept: SCHEDULING | Facility: HOME HEALTH | Age: 83
End: 2018-02-12
Payer: MEDICARE

## 2018-02-12 PROCEDURE — 3331090001 HH PPS REVENUE CREDIT

## 2018-02-12 PROCEDURE — 3331090002 HH PPS REVENUE DEBIT

## 2018-02-12 PROCEDURE — G0299 HHS/HOSPICE OF RN EA 15 MIN: HCPCS

## 2018-02-13 VITALS
TEMPERATURE: 97.2 F | HEART RATE: 76 BPM | OXYGEN SATURATION: 98 % | DIASTOLIC BLOOD PRESSURE: 64 MMHG | SYSTOLIC BLOOD PRESSURE: 100 MMHG | RESPIRATION RATE: 20 BRPM

## 2018-02-13 PROCEDURE — 3331090002 HH PPS REVENUE DEBIT

## 2018-02-13 PROCEDURE — 3331090001 HH PPS REVENUE CREDIT

## 2018-02-14 PROCEDURE — 3331090002 HH PPS REVENUE DEBIT

## 2018-02-14 PROCEDURE — 3331090001 HH PPS REVENUE CREDIT

## 2018-02-15 ENCOUNTER — TELEPHONE (OUTPATIENT)
Dept: ORTHOPEDIC SURGERY | Age: 83
End: 2018-02-15

## 2018-02-15 NOTE — TELEPHONE ENCOUNTER
See below. Patient will need to be seen for med fu for refills. Can you give him a list of the PM clinics.

## 2018-02-15 NOTE — TELEPHONE ENCOUNTER
Spoke with patient , he has only 2 pills left, made him an appt to see Dr. Nigel Carroll for medication refill and will place a new referral on Monday with Dr. aDvid Winters if Dr. Nigel Carroll approves.

## 2018-02-19 ENCOUNTER — OFFICE VISIT (OUTPATIENT)
Dept: ORTHOPEDIC SURGERY | Age: 83
End: 2018-02-19

## 2018-02-19 DIAGNOSIS — M54.50 CHRONIC BILATERAL LOW BACK PAIN WITHOUT SCIATICA: Primary | Chronic | ICD-10-CM

## 2018-02-19 DIAGNOSIS — G89.29 CHRONIC BILATERAL LOW BACK PAIN WITHOUT SCIATICA: Primary | Chronic | ICD-10-CM

## 2018-02-19 DIAGNOSIS — Z79.899 ENCOUNTER FOR LONG-TERM (CURRENT) DRUG USE: ICD-10-CM

## 2018-02-19 DIAGNOSIS — M88.9 PAGET DISEASE OF BONE: ICD-10-CM

## 2018-02-19 RX ORDER — FENTANYL 25 UG/1
1 PATCH TRANSDERMAL
Qty: 10 PATCH | Refills: 0 | Status: SHIPPED | OUTPATIENT
Start: 2018-02-19 | End: 2021-08-13

## 2018-02-19 RX ORDER — GABAPENTIN 300 MG/1
300 CAPSULE ORAL 3 TIMES DAILY
COMMUNITY
Start: 2018-01-16 | End: 2021-08-17

## 2018-02-19 RX ORDER — AMOXICILLIN 500 MG/1
CAPSULE ORAL
COMMUNITY
Start: 2018-02-16 | End: 2021-08-13

## 2018-02-19 RX ORDER — HYDROCODONE BITARTRATE AND ACETAMINOPHEN 10; 325 MG/1; MG/1
TABLET ORAL
Qty: 45 TAB | Refills: 0 | OUTPATIENT
Start: 2018-02-19 | End: 2021-07-08

## 2018-02-19 RX ORDER — DUREZOL 0.5 MG/ML
EMULSION OPHTHALMIC
COMMUNITY
Start: 2017-11-30 | End: 2021-08-13

## 2018-02-19 NOTE — PROGRESS NOTES
[facility] [address].  [address], [address]  [place], [address] [place] [address] Street  Phone: [phone]  Fax: [phone]        Mary Jo Leahy  : 3/14/1927  PCP: MD Hazel    PROGRESS NOTE      ASSESSMENT AND PLAN    Diagnoses and all orders for this visit:    1. Chronic bilateral low back pain without sciatica  -     HYDROcodone-acetaminophen (NORCO)  mg tablet; 1 tab PO once to twice a day prn pain  -     REFERRAL TO PAIN MANAGEMENT  -     fentaNYL (DURAGESIC) 25 mcg/hr PATCH; 1 Patch by TransDERmal route every seventy-two (72) hours. Max Daily Amount: 1 Patch. 2. Paget disease of bone  -     REFERRAL TO PAIN MANAGEMENT  -     fentaNYL (DURAGESIC) 25 mcg/hr PATCH; 1 Patch by TransDERmal route every seventy-two (72) hours. Max Daily Amount: 1 Patch. 3. Encounter for long-term (current) drug use  -     DRUG SCREEN UR - W/ CONFIRM; Future  -     REFERRAL TO PAIN MANAGEMENT    1. Compliant patient, needs referral to PM as previous MME was 200.  2. Safe use of opioids discussed with pt for acute vs chronic pain  3. Referral to Pain Management. 4. Continue Norco #45, Gabapentin, Dulcolax. 5. Tapered Duragesic patches to 25 mcg. This would reduce  MME to 80. If pt unable to tolerate this decrease in medication, will have to see pain management for previous regimen   6. UDS and PA done today. Follow-up Disposition:  Return in about 3 months (around 2018). Risks and benefits of ongoing opiate therapy have been reviewed with the patient. Per review of available records and patients , there are not signs of overuse, misuse, diversion, or concerning side effects. UDS results have been consistent. Pt has a good risk to benefit ratio which allows the pt to function in a home environment without side effects. HISTORY OF PRESENT ILLNESS  Sabi Oliva is a 80 y.o. male.  Pt presents to the office for a f/u visit for chronic  pain and medication management. He was referred to pain management last visit but the facility he was sent to was not accepting new patients. Pt reports he has not had any radiating pain lately as it mainly stays in his low back. He has good and bad days with his pain. Pt denies saddle paresthesias. Pt states he has been using Duragesic patches 75 mcg PRN, he states that he will only apply the patch when he is hurting. He last applied the patch yesterday and keeps it on for 3 to 4 days.  consistent with this hx-last fill of patch was December. Places patch in upper trunk. Kristin Aquino He takes Norco  mg PRN for BTP with  relief. He states that he usually takes a couple of tabs of Norco every day. Pt denies any dizziness, confusion, uncontrolled constipation, and cravings due to controlled substances. He takes Gabapentin PRN. Denies persistent fevers, chills, weight changes, neurogenic bowel or bladder symptoms. Pt was hospitalized for 5 days for pneumonia and renal failure. Hx of paget's disease.       Pain effects sleep, work, standing, play and recreational activities, and his ability to perform ADLs. He has tried; PT-Yes,  Non-opioid medications Yes, and spinal injections Yes.       Opioid Assessment     Opioid Risk Tool Reviewed: YES, Score = 0  Aberrant behaviors: None. Urine Drug Screen: updated today. Controlled substance agreement on file: YES.  reviewed:yes. Pt had dental work and was given Norco.   Concomitant use of a benzodiazepine: no  MME is 200     Reports that pain would be a 10/10 w/out medication and that it goes down to a 2-3/10 after medication. This enables the pt to be functional, achieve ADLs and engage in social activities.     Pain Assessment  2/19/2018   Location of Pain Back   Pain Location Comment -   Location Modifiers -   Severity of Pain 5   Quality of Pain Aching   Quality of Pain Comment -   Duration of Pain -   Frequency of Pain Constant   Date Pain First Started -   Aggravating Factors (No Data)   Aggravating Factors Comment patient states he's unsure of aggravating factor   Limiting Behavior -   Relieving Factors (No Data)   Relieving Factors Comment Pain Medication   Result of Injury -       PAST MEDICAL HISTORY   Past Medical History:   Diagnosis Date    Arthritis     Back pain, chronic        Past Surgical History:   Procedure Laterality Date    HX HEENT      relieved pressure R eye 1/2014   . MEDICATIONS    Current Outpatient Prescriptions   Medication Sig Dispense Refill    gabapentin (NEURONTIN) 300 mg capsule       DUREZOL 0.05 % ophthalmic emulsion       guaiFENesin (ROBITUSSIN) 100 mg/5 mL liquid Take 5 mL by mouth every four (4) hours as needed for Cough. 118 mL 0    therapeutic multivitamin (THERAGRAN) tablet Take 1 Tab by mouth daily. 30 Tab 11    bisacodyl (DULCOLAX) 5 mg EC tablet Take 1 Tab by mouth daily as needed for Constipation. 25 Tab 2    senna-docusate (PERICOLACE) 8.6-50 mg per tablet Take 2 Tabs by mouth daily. 60 Tab 2    latanoprost (XALATAN) 0.005 % ophthalmic solution Administer 1 Drop to both eyes nightly.  loteprednol etabonate (LOTEMAX) 0.5 % drpg Administer 1 Each to both eyes daily. (1) drop in both eyes every day      bromfenac (PROLENSA) 0.07 % ophthalmic solution Administer 1 Drop to both eyes daily.  polyvinyl alcohol-povidon,PF, (REFRESH CLASSIC, PF,) 1.4-0.6 % ophthalmic solution Administer 1-2 Drops to both eyes as needed (dryness).  fentaNYL (DURAGESIC) 75 mcg/hr Apply 1 patch every 3 days for chronic pain 10 Patch 0    HYDROcodone-acetaminophen (NORCO)  mg tablet Take 1-2 tabs po daily prn chronic pain 45 Tab 0    timolol (TIMOPTIC) 0.5 % ophthalmic solution Administer 1 Drop to both eyes two (2) times a day.       amoxicillin (AMOXIL) 500 mg capsule           ALLERGIES  No Known Allergies       SOCIAL HISTORY    Social History     Social History    Marital status:      Spouse name: N/A    Number of children: N/A    Years of education: N/A     Occupational History    Not on file. Social History Main Topics    Smoking status: Never Smoker    Smokeless tobacco: Never Used    Alcohol use No    Drug use: No    Sexual activity: Not on file     Other Topics Concern    Not on file     Social History Narrative       FAMILY HISTORY  Family History   Problem Relation Age of Onset    Diabetes Father     No Known Problems Mother        REVIEW OF SYSTEMS  Review of Systems   Constitutional: Negative for chills, fever and weight loss. Respiratory: Negative for shortness of breath. Cardiovascular: Negative for chest pain. Gastrointestinal: Negative for constipation. Negative for fecal incontinence   Genitourinary: Negative for dysuria. Negative for urinary incontinence   Musculoskeletal:        Per HPI   Skin: Negative for rash. Neurological: Negative for dizziness, tingling, tremors, focal weakness and headaches. Endo/Heme/Allergies: Does not bruise/bleed easily. Psychiatric/Behavioral: The patient does not have insomnia. PHYSICAL EXAMINATION  There were no vitals taken for this visit. Accompanied by family member. Constitutional:  Well developed, well nourished, in no acute distress. Psychiatric: Affect and mood are appropriate. Integumentary: No rashes or abrasions noted on exposed areas. Cardiovascular/Peripheral Vascular: Intact l pulses. No peripheral edema is noted. Lymphatic:  No evidence of lymphedema. No cervical lymphadenopathy. SPINE/MUSCULOSKELETAL EXAM      Lumbar spine:  No rash, ecchymosis, or gross obliquity. No fasciculations. No focal atrophy is noted. Tenderness to palpation L5-S1 and sacrum. No tenderness to palpation at the sciatic notch. SI joints non-tender. Trochanters non tender.             MOTOR:       Hip Flex  Quads Hamstrings Ankle DF EHL Ankle PF   Right +4/5 +4/5 +4/5 +4/5 +4/5 +4/5   Left +4/5 +4/5 +4/5 +4/5 +4/5 +4/5 Ambulation with single point cane. FWB. Written by Kassidy Cronin, as dictated by Joey Luis MD.    I, Dr. Joey Luis MD, confirm that all documentation is accurate. Mr. Lucas Lester may have a reminder for a \"due or due soon\" health maintenance. I have asked that he contact his primary care provider for follow-up on this health maintenance.

## 2018-02-19 NOTE — MR AVS SNAPSHOT
[address] [place] [address] [address] [address] 
[phone] Patient: Alhaji Olivares MRN: [id number] JULIETTE:[id number][birth year]78 Visit Information Date & Time Provider Department Dept. Phone Encounter #  
 2/19/2018  9:30  [address], MD [address]  [facility] [phone] [id number] Follow-up Instructions Return in about 3 months (around 5/19/2018). Upcoming Health Maintenance Date Due DTaP/Tdap/Td series (1 - Tdap) 3/14/1948 ZOSTER VACCINE AGE 60> 1/14/1987 GLAUCOMA SCREENING Q2Y 3/14/1992 Pneumococcal 65+ High/Highest Risk (1 of 2 - PCV13) 3/14/1992 MEDICARE YEARLY EXAM 3/14/1992 Influenza Age 5 to Adult 8/1/2017 Allergies as of 2/19/2018  Review Complete On: 2/19/2018 By: Royce Graham LPN No Known Allergies Current Immunizations  Never Reviewed No immunizations on file. Not reviewed this visit You Were Diagnosed With   
  
 Codes Comments Chronic bilateral low back pain without sciatica    -  Primary ICD-10-CM: M54.5, G89.29 ICD-9-CM: 724.2, 338.29 Paget disease of bone     ICD-10-CM: M88.9 ICD-9-CM: 731.0 Encounter for long-term (current) drug use     ICD-10-CM: Z79.899 ICD-9-CM: V58.69 Vitals Smoking Status Never Smoker Preferred Pharmacy Pharmacy Name Phone [facility] [address] [place] [address],[address] [place] [address] [phone] Your Updated Medication List  
  
   
This list is accurate as of: 2/19/18 11:21 AM.  Always use your most recent med list.  
  
  
  
  
 amoxicillin 500 mg capsule Commonly known as:  AMOXIL  
  
 bisacodyl 5 mg EC tablet Commonly known as:  DULCOLAX Take 1 Tab by mouth daily as needed for Constipation. DUREZOL 0.05 % ophthalmic emulsion Generic drug:  Difluprednate * fentaNYL 75 mcg/hr Commonly known as:  SquareKeyer  
 Apply 1 patch every 3 days for chronic pain * fentaNYL 25 mcg/hr PATCH Commonly known as:  DURAGESIC  
1 Patch by TransDERmal route every seventy-two (72) hours. Max Daily Amount: 1 Patch.  
  
 gabapentin 300 mg capsule Commonly known as:  NEURONTIN  
  
 guaiFENesin 100 mg/5 mL liquid Commonly known as:  ROBITUSSIN Take 5 mL by mouth every four (4) hours as needed for Cough. HYDROcodone-acetaminophen  mg tablet Commonly known as:  NORCO  
1 tab PO once to twice a day prn pain  
  
 latanoprost 0.005 % ophthalmic solution Commonly known as:  [place] [facility] Administer 1 Drop to both eyes nightly. LOTEMAX 0.5 % Drpg Generic drug:  loteprednol etabonate Administer 1 Each to both eyes daily. (1) drop in both eyes every day PROLENSA 0.07 % ophthalmic solution Generic drug:  bromfenac Administer 1 Drop to both eyes daily. REFRESH CLASSIC (PF) 1.4-0.6 % ophthalmic solution Generic drug:  polyvinyl alcohol-povidon(PF) Administer 1-2 Drops to both eyes as needed (dryness). senna-docusate 8.6-50 mg per tablet Commonly known as:  [facility] Take 2 Tabs by mouth daily. therapeutic multivitamin tablet Commonly known as:  [facility] Take 1 Tab by mouth daily. timolol 0.5 % ophthalmic solution Commonly known as:  TIMOPTIC Administer 1 Drop to both eyes two (2) times a day. * Notice: This list has 2 medication(s) that are the same as other medications prescribed for you. Read the directions carefully, and ask your doctor or other care provider to review them with you. Prescriptions Printed Refills HYDROcodone-acetaminophen (NORCO)  mg tablet 0 Si tab PO once to twice a day prn pain  
 Class: Print  
 fentaNYL (DURAGESIC) 25 mcg/hr PATCH 0 Si Patch by TransDERmal route every seventy-two (72) hours. Max Daily Amount: 1 Patch. Class: Print Route: TransDERmal  
  
We Performed the Following REFERRAL TO PAIN MANAGEMENT [YPO728 Custom] Follow-up Instructions Return in about 3 months (around 5/19/2018). To-Do List   
 02/19/2018 Lab:  DRUG SCREEN UR - W/ CONFIRM Referral Information Referral ID Referred By Referred To  
  
 [id number] Geni Aparicio Not Available Visits Status Start Date End Date 1 New Request 2/19/18 2/19/19 If your referral has a status of pending review or denied, additional information will be sent to support the outcome of this decision. Introducing [facility]! Dear Kole Conteh: Thank you for requesting a [employer] account. Our records indicate that you have previously registered for a [employer] account but its currently inactive. Please call our [employer] support line at [phone]. Additional Information If you have questions, please visit the Frequently Asked Questions section of the [employer] website at [email or web address] Remember, [employer] is NOT to be used for urgent needs. For medical emergencies, dial 911. Now available from your iPhone and Android! Please provide this summary of care documentation to your next provider. Your primary care clinician is listed as Hazel. If you have any questions after today's visit, please call [phone].

## 2018-02-19 NOTE — PROGRESS NOTES
Verbal order entered per Dr. Newton Herrera as documented on blue sheet:   -pain management  -duragesic 25 mg, 1 patch Q72 hours, disp 10, 0 RF  -norco 10 mg every day-BID prn pain, disp 45, 0 RF  -Oral Swab done today

## 2018-03-06 ENCOUNTER — TELEPHONE (OUTPATIENT)
Dept: ORTHOPEDIC SURGERY | Age: 83
End: 2018-03-06

## 2018-03-06 NOTE — TELEPHONE ENCOUNTER
-called patient and spoke with his son José Miguel Fuller who is on the permission to release form. He was informed to let his father know that per Dr. Sarah Beth Cordova he will not be prescribed anything stronger by our clinic and that he needs to make the pain management appointment. The son verbalized understanding and is going to inform his father.    -while on the phone the son asked when his appointment was for the pain management. The information given by his father was incorrect when he came to the clinic. They were never contacted by the pain management clinic. The son was given the number to Dr. Zaragoza Sample office in order to set up the appointment.

## 2018-03-06 NOTE — TELEPHONE ENCOUNTER
-the patient and his son came to the [facility] clinic today at 46 in regards to his message called in at [phone] today. The patient came in requesting Dr. Clarita Cabrales to raise the strength for his fentanyl Rx. He stated that the current strength is \"too weak\" and he wants an increase. It was explained to him that his MME was very high and he needed to make another appointment to be re-evaluated for what he is requesting. The patient verbalized understanding and he wanted a call back if anything sooner was decided or changed. -MELI Sadler was made aware of the patients presence and request for medication increase.    -his appointment is set for the 14th of March and his pain management appointment is on the 19th of March. The patient is aware of both dates and times for appointments.

## 2018-03-06 NOTE — TELEPHONE ENCOUNTER
By law he is at his maximum MME for our practice. We cannot Rx higher dosages due to new laws and regulations, this is why Dr Sarah Beth Cordova referred him to Pain Management.    He can take 1 additional Tylenol to his Norco.   I do not feel comfortable increasing his other meds, such as Gabapentin due to his recent kidney failure

## 2018-03-06 NOTE — TELEPHONE ENCOUNTER
Patients son, Dinora Jean (ok per HIPAA), called to request Dr. Johnston Forth re-evaluate rx for fentaNYL (DURAGESIC) 25 mcg, patient needs a stronger dosage due to pain. He has not been scheduled at pain mgmt yet and decreased dosage is not effective - has been taking for about 3 weeks now.   Please review and advise son at [phone]

## 2018-03-06 NOTE — TELEPHONE ENCOUNTER
Since he has an upcoming PM appt a few days after our appt, he can cx his appt with us. I wont be able to give him anything stronger.

## 2018-07-24 NOTE — TELEPHONE ENCOUNTER
PT CAME BY [facility] TO SAY THAT THE PAIN MGMT HE WAS REF TO IS NOT TAKING NEW PATIENTS AND HE IS ABOUT OUT OF PAIN MEDICATION. PT WOULD LIKE TO KNOW IF HE CAN GET A REFILL AND A NEW LIST OF PAIN MGMT LOCATIONS TO TRY TO GET INTO. PLEASE CALL PT TO ADVISE. PT STATES HE HAS 2 PILLS LEFT. 123

## 2019-03-04 ENCOUNTER — HOSPITAL ENCOUNTER (OUTPATIENT)
Dept: LAB | Age: 84
Discharge: HOME | End: 2019-03-04
Payer: MEDICARE

## 2019-03-04 DIAGNOSIS — I10 ESSENTIAL HYPERTENSION, MALIGNANT: ICD-10-CM

## 2019-03-04 DIAGNOSIS — N40.0 BENIGN ENLARGEMENT OF PROSTATE: ICD-10-CM

## 2019-03-04 DIAGNOSIS — H40.10X0 GLAUCOMA, OPEN ANGLE: ICD-10-CM

## 2019-03-04 DIAGNOSIS — E78.00 PURE HYPERCHOLESTEROLEMIA: ICD-10-CM

## 2019-03-04 DIAGNOSIS — E44.1 MALNUTRITION OF MILD DEGREE (HCC): ICD-10-CM

## 2019-03-04 LAB
ALBUMIN SERPL-MCNC: 3.6 G/DL (ref 3.4–5)
ALBUMIN/GLOB SERPL: 0.8 {RATIO} (ref 0.8–1.7)
ALP SERPL-CCNC: 289 U/L (ref 45–117)
ALT SERPL-CCNC: 11 U/L (ref 16–61)
ANION GAP SERPL CALC-SCNC: 8 MMOL/L (ref 3–18)
AST SERPL-CCNC: 17 U/L (ref 15–37)
BASOPHILS # BLD: 0 K/UL (ref 0–0.1)
BASOPHILS NFR BLD: 1 % (ref 0–2)
BILIRUB SERPL-MCNC: 0.7 MG/DL (ref 0.2–1)
BUN SERPL-MCNC: 30 MG/DL (ref 7–18)
BUN/CREAT SERPL: 16 (ref 12–20)
CALCIUM SERPL-MCNC: 9 MG/DL (ref 8.5–10.1)
CHLORIDE SERPL-SCNC: 112 MMOL/L (ref 100–108)
CHOLEST SERPL-MCNC: 109 MG/DL
CO2 SERPL-SCNC: 21 MMOL/L (ref 21–32)
CREAT SERPL-MCNC: 1.84 MG/DL (ref 0.6–1.3)
DIFFERENTIAL METHOD BLD: ABNORMAL
EOSINOPHIL # BLD: 0.4 K/UL (ref 0–0.4)
EOSINOPHIL NFR BLD: 7 % (ref 0–5)
ERYTHROCYTE [DISTWIDTH] IN BLOOD BY AUTOMATED COUNT: 14.8 % (ref 11.6–14.5)
GLOBULIN SER CALC-MCNC: 4.6 G/DL (ref 2–4)
GLUCOSE SERPL-MCNC: 90 MG/DL (ref 74–99)
HCT VFR BLD AUTO: 35.9 % (ref 36–48)
HDLC SERPL-MCNC: 22 MG/DL (ref 40–60)
HDLC SERPL: 5 {RATIO} (ref 0–5)
HGB BLD-MCNC: 11.4 G/DL (ref 13–16)
LDLC SERPL CALC-MCNC: 62.2 MG/DL (ref 0–100)
LIPID PROFILE,FLP: ABNORMAL
LYMPHOCYTES # BLD: 2.5 K/UL (ref 0.9–3.6)
LYMPHOCYTES NFR BLD: 44 % (ref 21–52)
MCH RBC QN AUTO: 27 PG (ref 24–34)
MCHC RBC AUTO-ENTMCNC: 31.8 G/DL (ref 31–37)
MCV RBC AUTO: 84.9 FL (ref 74–97)
MONOCYTES # BLD: 0.6 K/UL (ref 0.05–1.2)
MONOCYTES NFR BLD: 10 % (ref 3–10)
NEUTS SEG # BLD: 2.2 K/UL (ref 1.8–8)
NEUTS SEG NFR BLD: 38 % (ref 40–73)
PLATELET # BLD AUTO: 211 K/UL (ref 135–420)
PMV BLD AUTO: 11.3 FL (ref 9.2–11.8)
POTASSIUM SERPL-SCNC: 4.1 MMOL/L (ref 3.5–5.5)
PROT SERPL-MCNC: 8.2 G/DL (ref 6.4–8.2)
RBC # BLD AUTO: 4.23 M/UL (ref 4.7–5.5)
SODIUM SERPL-SCNC: 141 MMOL/L (ref 136–145)
T4 SERPL-MCNC: 11 UG/DL (ref 4.7–13.3)
TRIGL SERPL-MCNC: 124 MG/DL (ref ?–150)
TSH SERPL DL<=0.05 MIU/L-ACNC: 1.46 UIU/ML (ref 0.36–3.74)
VLDLC SERPL CALC-MCNC: 24.8 MG/DL
WBC # BLD AUTO: 5.7 K/UL (ref 4.6–13.2)

## 2019-03-04 PROCEDURE — 36415 COLL VENOUS BLD VENIPUNCTURE: CPT

## 2019-03-04 PROCEDURE — 84436 ASSAY OF TOTAL THYROXINE: CPT

## 2019-03-04 PROCEDURE — 85025 COMPLETE CBC W/AUTO DIFF WBC: CPT

## 2019-03-04 PROCEDURE — 80061 LIPID PANEL: CPT

## 2019-03-04 PROCEDURE — 80053 COMPREHEN METABOLIC PANEL: CPT

## 2019-03-04 PROCEDURE — 84443 ASSAY THYROID STIM HORMONE: CPT

## 2021-02-04 ENCOUNTER — TRANSCRIBE ORDER (OUTPATIENT)
Dept: REGISTRATION | Age: 86
End: 2021-02-04

## 2021-02-04 ENCOUNTER — HOSPITAL ENCOUNTER (OUTPATIENT)
Dept: LAB | Age: 86
Discharge: HOME | End: 2021-02-04
Payer: MEDICARE

## 2021-02-04 DIAGNOSIS — Z00.01 ENCOUNTER FOR GENERAL ADULT MEDICAL EXAMINATION WITH ABNORMAL FINDINGS: Primary | ICD-10-CM

## 2021-02-04 DIAGNOSIS — E44.1 MALNUTRITION OF MILD DEGREE (HCC): ICD-10-CM

## 2021-02-04 LAB
ALBUMIN SERPL-MCNC: 3.5 G/DL (ref 3.4–5)
ALBUMIN/GLOB SERPL: 0.9 {RATIO} (ref 0.8–1.7)
ALP SERPL-CCNC: 245 U/L (ref 45–117)
ALT SERPL-CCNC: 16 U/L (ref 16–61)
ANION GAP SERPL CALC-SCNC: 7 MMOL/L (ref 3–18)
AST SERPL-CCNC: 17 U/L (ref 10–38)
BASOPHILS # BLD: 0 K/UL (ref 0–0.1)
BASOPHILS NFR BLD: 1 % (ref 0–2)
BILIRUB SERPL-MCNC: 0.7 MG/DL (ref 0.2–1)
BUN SERPL-MCNC: 26 MG/DL (ref 7–18)
BUN/CREAT SERPL: 15 (ref 12–20)
CALCIUM SERPL-MCNC: 9.1 MG/DL (ref 8.5–10.1)
CHLORIDE SERPL-SCNC: 113 MMOL/L (ref 100–111)
CHOLEST SERPL-MCNC: 117 MG/DL
CO2 SERPL-SCNC: 20 MMOL/L (ref 21–32)
CREAT SERPL-MCNC: 1.77 MG/DL (ref 0.6–1.3)
DIFFERENTIAL METHOD BLD: ABNORMAL
EOSINOPHIL # BLD: 0.3 K/UL (ref 0–0.4)
EOSINOPHIL NFR BLD: 7 % (ref 0–5)
ERYTHROCYTE [DISTWIDTH] IN BLOOD BY AUTOMATED COUNT: 14 % (ref 11.6–14.5)
GLOBULIN SER CALC-MCNC: 3.8 G/DL (ref 2–4)
GLUCOSE SERPL-MCNC: 75 MG/DL (ref 74–99)
HCT VFR BLD AUTO: 37.6 % (ref 36–48)
HDLC SERPL-MCNC: 29 MG/DL (ref 40–60)
HDLC SERPL: 4 {RATIO} (ref 0–5)
HGB BLD-MCNC: 12 G/DL (ref 13–16)
LDLC SERPL CALC-MCNC: 69.4 MG/DL (ref 0–100)
LIPID PROFILE,FLP: ABNORMAL
LYMPHOCYTES # BLD: 2.1 K/UL (ref 0.9–3.6)
LYMPHOCYTES NFR BLD: 44 % (ref 21–52)
MCH RBC QN AUTO: 27.9 PG (ref 24–34)
MCHC RBC AUTO-ENTMCNC: 31.9 G/DL (ref 31–37)
MCV RBC AUTO: 87.4 FL (ref 74–97)
MONOCYTES # BLD: 0.5 K/UL (ref 0.05–1.2)
MONOCYTES NFR BLD: 12 % (ref 3–10)
NEUTS SEG # BLD: 1.7 K/UL (ref 1.8–8)
NEUTS SEG NFR BLD: 36 % (ref 40–73)
PLATELET # BLD AUTO: 134 K/UL (ref 135–420)
PMV BLD AUTO: 11.5 FL (ref 9.2–11.8)
POTASSIUM SERPL-SCNC: 4.5 MMOL/L (ref 3.5–5.5)
PROT SERPL-MCNC: 7.3 G/DL (ref 6.4–8.2)
RBC # BLD AUTO: 4.3 M/UL (ref 4.7–5.5)
SODIUM SERPL-SCNC: 140 MMOL/L (ref 136–145)
TRIGL SERPL-MCNC: 93 MG/DL (ref ?–150)
TSH SERPL DL<=0.05 MIU/L-ACNC: 2.25 UIU/ML (ref 0.36–3.74)
VLDLC SERPL CALC-MCNC: 18.6 MG/DL
WBC # BLD AUTO: 4.6 K/UL (ref 4.6–13.2)

## 2021-02-04 PROCEDURE — 85025 COMPLETE CBC W/AUTO DIFF WBC: CPT

## 2021-02-04 PROCEDURE — 84436 ASSAY OF TOTAL THYROXINE: CPT

## 2021-02-04 PROCEDURE — 84443 ASSAY THYROID STIM HORMONE: CPT

## 2021-02-04 PROCEDURE — 80061 LIPID PANEL: CPT

## 2021-02-04 PROCEDURE — 36415 COLL VENOUS BLD VENIPUNCTURE: CPT

## 2021-02-04 PROCEDURE — 80053 COMPREHEN METABOLIC PANEL: CPT

## 2021-02-05 LAB — T4 SERPL-MCNC: 9.5 UG/DL (ref 4.5–12.1)

## 2021-07-08 ENCOUNTER — HOSPITAL ENCOUNTER (EMERGENCY)
Age: 86
Discharge: HOME | End: 2021-07-08
Attending: EMERGENCY MEDICINE
Payer: MEDICARE

## 2021-07-08 ENCOUNTER — APPOINTMENT (OUTPATIENT)
Dept: CT IMAGING | Age: 86
End: 2021-07-08
Attending: NURSE PRACTITIONER
Payer: MEDICARE

## 2021-07-08 VITALS
RESPIRATION RATE: 20 BRPM | SYSTOLIC BLOOD PRESSURE: 108 MMHG | HEART RATE: 99 BPM | BODY MASS INDEX: 18.03 KG/M2 | TEMPERATURE: 97.8 F | WEIGHT: 136.69 LBS | OXYGEN SATURATION: 99 % | DIASTOLIC BLOOD PRESSURE: 68 MMHG

## 2021-07-08 DIAGNOSIS — N30.00 ACUTE CYSTITIS WITHOUT HEMATURIA: ICD-10-CM

## 2021-07-08 DIAGNOSIS — M54.50 CHRONIC LOW BACK PAIN WITHOUT SCIATICA, UNSPECIFIED BACK PAIN LATERALITY: ICD-10-CM

## 2021-07-08 DIAGNOSIS — G89.29 CHRONIC LOW BACK PAIN WITHOUT SCIATICA, UNSPECIFIED BACK PAIN LATERALITY: ICD-10-CM

## 2021-07-08 DIAGNOSIS — R10.84 ABDOMINAL PAIN, GENERALIZED: Primary | ICD-10-CM

## 2021-07-08 LAB
ALBUMIN SERPL-MCNC: 3.9 G/DL (ref 3.4–5)
ALBUMIN/GLOB SERPL: 0.8 {RATIO} (ref 0.8–1.7)
ALP SERPL-CCNC: 186 U/L (ref 45–117)
ALT SERPL-CCNC: 17 U/L (ref 16–61)
ANION GAP SERPL CALC-SCNC: 9 MMOL/L (ref 3–18)
APPEARANCE UR: ABNORMAL
AST SERPL-CCNC: 35 U/L (ref 10–38)
BACTERIA URNS QL MICRO: ABNORMAL /HPF
BASOPHILS # BLD: 0.1 K/UL (ref 0–0.1)
BASOPHILS NFR BLD: 1 % (ref 0–2)
BILIRUB SERPL-MCNC: 1.1 MG/DL (ref 0.2–1)
BILIRUB UR QL: ABNORMAL
BUN SERPL-MCNC: 37 MG/DL (ref 7–18)
BUN/CREAT SERPL: 17 (ref 12–20)
CALCIUM SERPL-MCNC: 9.4 MG/DL (ref 8.5–10.1)
CHLORIDE SERPL-SCNC: 113 MMOL/L (ref 100–111)
CO2 SERPL-SCNC: 22 MMOL/L (ref 21–32)
COLOR UR: ABNORMAL
CREAT SERPL-MCNC: 2.12 MG/DL (ref 0.6–1.3)
DIFFERENTIAL METHOD BLD: ABNORMAL
EOSINOPHIL # BLD: 0.3 K/UL (ref 0–0.4)
EOSINOPHIL NFR BLD: 6 % (ref 0–5)
EPITH CASTS URNS QL MICRO: ABNORMAL /LPF (ref 0–5)
ERYTHROCYTE [DISTWIDTH] IN BLOOD BY AUTOMATED COUNT: 13.7 % (ref 11.6–14.5)
GLOBULIN SER CALC-MCNC: 5.2 G/DL (ref 2–4)
GLUCOSE SERPL-MCNC: 89 MG/DL (ref 74–99)
GLUCOSE UR STRIP.AUTO-MCNC: NEGATIVE MG/DL
HCT VFR BLD AUTO: 34.7 % (ref 36–48)
HGB BLD-MCNC: 10.4 G/DL (ref 13–16)
HGB UR QL STRIP: ABNORMAL
KETONES UR QL STRIP.AUTO: ABNORMAL MG/DL
LACTATE BLD-SCNC: 1.97 MMOL/L (ref 0.4–2)
LEUKOCYTE ESTERASE UR QL STRIP.AUTO: ABNORMAL
LIPASE SERPL-CCNC: 167 U/L (ref 73–393)
LYMPHOCYTES # BLD: 1.7 K/UL (ref 0.9–3.6)
LYMPHOCYTES NFR BLD: 34 % (ref 21–52)
MCH RBC QN AUTO: 26.9 PG (ref 24–34)
MCHC RBC AUTO-ENTMCNC: 30 G/DL (ref 31–37)
MCV RBC AUTO: 89.9 FL (ref 74–97)
MONOCYTES # BLD: 0.6 K/UL (ref 0.05–1.2)
MONOCYTES NFR BLD: 12 % (ref 3–10)
NEUTS SEG # BLD: 2.3 K/UL (ref 1.8–8)
NEUTS SEG NFR BLD: 47 % (ref 40–73)
NITRITE UR QL STRIP.AUTO: POSITIVE
PH UR STRIP: 5 [PH] (ref 5–8)
PLATELET # BLD AUTO: 125 K/UL (ref 135–420)
PMV BLD AUTO: 11.3 FL (ref 9.2–11.8)
POTASSIUM SERPL-SCNC: 4.8 MMOL/L (ref 3.5–5.5)
PROT SERPL-MCNC: 9.1 G/DL (ref 6.4–8.2)
PROT UR STRIP-MCNC: 30 MG/DL
RBC # BLD AUTO: 3.86 M/UL (ref 4.35–5.65)
RBC #/AREA URNS HPF: ABNORMAL /HPF (ref 0–5)
SODIUM SERPL-SCNC: 144 MMOL/L (ref 136–145)
SP GR UR REFRACTOMETRY: 1.03 (ref 1–1.03)
UROBILINOGEN UR QL STRIP.AUTO: 1 EU/DL (ref 0.2–1)
WBC # BLD AUTO: 4.9 K/UL (ref 4.6–13.2)
WBC URNS QL MICRO: ABNORMAL /HPF (ref 0–5)

## 2021-07-08 PROCEDURE — 74011000250 HC RX REV CODE- 250: Performed by: NURSE PRACTITIONER

## 2021-07-08 PROCEDURE — 80053 COMPREHEN METABOLIC PANEL: CPT

## 2021-07-08 PROCEDURE — 74011250636 HC RX REV CODE- 250/636: Performed by: NURSE PRACTITIONER

## 2021-07-08 PROCEDURE — 96375 TX/PRO/DX INJ NEW DRUG ADDON: CPT

## 2021-07-08 PROCEDURE — 99284 EMERGENCY DEPT VISIT MOD MDM: CPT

## 2021-07-08 PROCEDURE — 74176 CT ABD & PELVIS W/O CONTRAST: CPT

## 2021-07-08 PROCEDURE — 87186 SC STD MICRODIL/AGAR DIL: CPT

## 2021-07-08 PROCEDURE — 85025 COMPLETE CBC W/AUTO DIFF WBC: CPT

## 2021-07-08 PROCEDURE — 83605 ASSAY OF LACTIC ACID: CPT

## 2021-07-08 PROCEDURE — 87086 URINE CULTURE/COLONY COUNT: CPT

## 2021-07-08 PROCEDURE — 96374 THER/PROPH/DIAG INJ IV PUSH: CPT

## 2021-07-08 PROCEDURE — 83690 ASSAY OF LIPASE: CPT

## 2021-07-08 PROCEDURE — 81001 URINALYSIS AUTO W/SCOPE: CPT

## 2021-07-08 PROCEDURE — 87077 CULTURE AEROBIC IDENTIFY: CPT

## 2021-07-08 RX ORDER — HYDROCODONE BITARTRATE AND ACETAMINOPHEN 5; 325 MG/1; MG/1
1 TABLET ORAL
Qty: 10 TABLET | Refills: 0 | Status: SHIPPED | OUTPATIENT
Start: 2021-07-08 | End: 2021-08-13

## 2021-07-08 RX ORDER — MORPHINE SULFATE 2 MG/ML
2 INJECTION, SOLUTION INTRAMUSCULAR; INTRAVENOUS
Status: COMPLETED | OUTPATIENT
Start: 2021-07-08 | End: 2021-07-08

## 2021-07-08 RX ORDER — LIDOCAINE 50 MG/G
PATCH TOPICAL
Qty: 30 EACH | Refills: 0 | Status: SHIPPED | OUTPATIENT
Start: 2021-07-08 | End: 2021-08-13

## 2021-07-08 RX ORDER — CEPHALEXIN 500 MG/1
500 CAPSULE ORAL 4 TIMES DAILY
Qty: 40 CAPSULE | Refills: 0 | Status: SHIPPED | OUTPATIENT
Start: 2021-07-08 | End: 2021-07-18

## 2021-07-08 RX ADMIN — WATER 1 G: 1 INJECTION INTRAMUSCULAR; INTRAVENOUS; SUBCUTANEOUS at 15:56

## 2021-07-08 RX ADMIN — SODIUM CHLORIDE 500 ML: 900 INJECTION, SOLUTION INTRAVENOUS at 11:42

## 2021-07-08 RX ADMIN — MORPHINE SULFATE 2 MG: 2 INJECTION, SOLUTION INTRAMUSCULAR; INTRAVENOUS at 11:39

## 2021-07-08 NOTE — ED PROVIDER NOTES
EMERGENCY DEPARTMENT HISTORY AND PHYSICAL EXAM    7:36 PM      Date: 7/8/2021  Patient Name: Melody Gloria    History of Presenting Illness     Chief Complaint   Patient presents with    Back Pain         History Provided By: Patient    Additional History (Context): Melody Gloria is a 80 y.o. male with With history of chronic back pain, pain management, Paget's disease, kidney disease who presents with complaint of lower abdominal pain that started about 3 weeks ago. Patient also reporting chronic back pain. Patient reports he was kicked out of pain management because he has to that lead to the room medication tested positive in the urine. Patient may have taken morphine from his son's medication. Patient denies any nausea, vomiting, urinary symptoms. Patient denies any chest pain or shortness of breath. Patient denies any difficulty with bowel movements. Patient sounds requesting pain management. PCP: Babar Chavez MD    Current Outpatient Medications   Medication Sig Dispense Refill    lidocaine (Lidoderm) 5 % Apply patch to the affected area for 12 hours a day and remove for 12 hours a day. 30 Each 0    HYDROcodone-acetaminophen (Norco) 5-325 mg per tablet Take 1 Tablet by mouth every six (6) hours as needed for Pain for up to 3 days. Max Daily Amount: 4 Tablets. 10 Tablet 0    cephALEXin (Keflex) 500 mg capsule Take 1 Capsule by mouth four (4) times daily for 10 days. 40 Capsule 0    amoxicillin (AMOXIL) 500 mg capsule       gabapentin (NEURONTIN) 300 mg capsule       DUREZOL 0.05 % ophthalmic emulsion       fentaNYL (DURAGESIC) 25 mcg/hr PATCH 1 Patch by TransDERmal route every seventy-two (72) hours. Max Daily Amount: 1 Patch. 10 Patch 0    guaiFENesin (ROBITUSSIN) 100 mg/5 mL liquid Take 5 mL by mouth every four (4) hours as needed for Cough. 118 mL 0    therapeutic multivitamin (THERAGRAN) tablet Take 1 Tab by mouth daily.  30 Tab 11    bisacodyl (DULCOLAX) 5 mg EC tablet Take 1 Tab by mouth daily as needed for Constipation. 25 Tab 2    senna-docusate (PERICOLACE) 8.6-50 mg per tablet Take 2 Tabs by mouth daily. 60 Tab 2    latanoprost (XALATAN) 0.005 % ophthalmic solution Administer 1 Drop to both eyes nightly.  loteprednol etabonate (LOTEMAX) 0.5 % drpg Administer 1 Each to both eyes daily. (1) drop in both eyes every day      bromfenac (PROLENSA) 0.07 % ophthalmic solution Administer 1 Drop to both eyes daily.  polyvinyl alcohol-povidon,PF, (REFRESH CLASSIC, PF,) 1.4-0.6 % ophthalmic solution Administer 1-2 Drops to both eyes as needed (dryness).  fentaNYL (DURAGESIC) 75 mcg/hr Apply 1 patch every 3 days for chronic pain 10 Patch 0    timolol (TIMOPTIC) 0.5 % ophthalmic solution Administer 1 Drop to both eyes two (2) times a day. Past History     Past Medical History:  Past Medical History:   Diagnosis Date    Arthritis     Back pain, chronic        Past Surgical History:  Past Surgical History:   Procedure Laterality Date    HX HEENT      relieved pressure R eye 1/2014       Family History:  Family History   Problem Relation Age of Onset    Diabetes Father     No Known Problems Mother        Social History:  Social History     Tobacco Use    Smoking status: Never Smoker    Smokeless tobacco: Never Used   Substance Use Topics    Alcohol use: No     Alcohol/week: 0.8 standard drinks     Types: 1 Standard drinks or equivalent per week    Drug use: No       Allergies:  No Known Allergies      Review of Systems       Review of Systems   Constitutional: Negative. Negative for chills and fever. Eyes: Negative. Negative for discharge. Respiratory: Negative. Negative for shortness of breath. Cardiovascular: Negative for chest pain. Gastrointestinal: Positive for abdominal pain. Negative for nausea and vomiting. Endocrine: Negative. Genitourinary: Negative. Negative for difficulty urinating, dysuria and hematuria.    Musculoskeletal: Positive for back pain. Skin: Negative. Negative for rash. Allergic/Immunologic: Negative. Neurological: Negative. Negative for dizziness, weakness and numbness. Hematological: Negative. Psychiatric/Behavioral: Negative. All other systems reviewed and are negative. Physical Exam     Visit Vitals  /68 (BP 1 Location: Left upper arm)   Pulse 99   Temp 97.8 °F (36.6 °C)   Resp 20   Wt 62 kg (136 lb 11 oz)   SpO2 99%   BMI 18.03 kg/m²         Physical Exam  Vitals reviewed. Constitutional:       General: He is awake. He is not in acute distress. Appearance: Normal appearance. He is well-developed, well-groomed and underweight. He is not ill-appearing, toxic-appearing or diaphoretic. Comments: Patient no acute distress. Nontoxic looking. Patient has thin frame. Denies any recent weight loss. HENT:      Head: Normocephalic and atraumatic. Mouth/Throat:      Lips: No lesions. Mouth: Mucous membranes are moist.   Eyes:      General: Vision grossly intact. Extraocular Movements: Extraocular movements intact. Right eye: Normal extraocular motion. Left eye: Normal extraocular motion. Conjunctiva/sclera: Conjunctivae normal.      Pupils: Pupils are equal, round, and reactive to light. Pupils are equal.   Neck:      Trachea: Trachea normal.   Cardiovascular:      Rate and Rhythm: Normal rate and regular rhythm. Heart sounds: Normal heart sounds. Pulmonary:      Effort: Pulmonary effort is normal.      Breath sounds: Normal breath sounds and air entry. Abdominal:      General: Bowel sounds are normal. There is no distension or abdominal bruit. Palpations: Abdomen is soft. Abdomen is not rigid. There is no shifting dullness, fluid wave, mass or pulsatile mass. Tenderness: There is generalized abdominal tenderness. There is no guarding. Negative signs include Mortensen's sign and McBurney's sign. Comments: Minimal generalized tenderness. Musculoskeletal:      Cervical back: Full passive range of motion without pain and normal range of motion. Lumbar back: Tenderness present. Right lower leg: No edema. Left lower leg: No edema. Comments: Generalized bilateral sacrolumbar tenderness on exam.  Patient was wearing back brace. Patient is ambulatory. Skin:     General: Skin is warm. Capillary Refill: Capillary refill takes less than 2 seconds. Neurological:      General: No focal deficit present. Mental Status: He is alert and oriented to person, place, and time. Mental status is at baseline. Psychiatric:         Attention and Perception: Attention normal.         Behavior: Behavior is cooperative. Diagnostic Study Results     Labs -  Recent Results (from the past 12 hour(s))   METABOLIC PANEL, COMPREHENSIVE    Collection Time: 07/08/21 11:27 AM   Result Value Ref Range    Sodium 144 136 - 145 mmol/L    Potassium 4.8 3.5 - 5.5 mmol/L    Chloride 113 (H) 100 - 111 mmol/L    CO2 22 21 - 32 mmol/L    Anion gap 9 3.0 - 18 mmol/L    Glucose 89 74 - 99 mg/dL    BUN 37 (H) 7.0 - 18 MG/DL    Creatinine 2.12 (H) 0.6 - 1.3 MG/DL    BUN/Creatinine ratio 17 12 - 20      GFR est AA 35 (L) >60 ml/min/1.73m2    GFR est non-AA 29 (L) >60 ml/min/1.73m2    Calcium 9.4 8.5 - 10.1 MG/DL    Bilirubin, total 1.1 (H) 0.2 - 1.0 MG/DL    ALT (SGPT) 17 16 - 61 U/L    AST (SGOT) 35 10 - 38 U/L    Alk.  phosphatase 186 (H) 45 - 117 U/L    Protein, total 9.1 (H) 6.4 - 8.2 g/dL    Albumin 3.9 3.4 - 5.0 g/dL    Globulin 5.2 (H) 2.0 - 4.0 g/dL    A-G Ratio 0.8 0.8 - 1.7     LIPASE    Collection Time: 07/08/21 11:27 AM   Result Value Ref Range    Lipase 167 73 - 393 U/L   POC LACTIC ACID    Collection Time: 07/08/21 11:36 AM   Result Value Ref Range    Lactic Acid (POC) 1.97 0.40 - 2.00 mmol/L   CBC WITH AUTOMATED DIFF    Collection Time: 07/08/21 12:51 PM   Result Value Ref Range    WBC 4.9 4.6 - 13.2 K/uL    RBC 3.86 (L) 4.35 - 5.65 M/uL HGB 10.4 (L) 13.0 - 16.0 g/dL    HCT 34.7 (L) 36.0 - 48.0 %    MCV 89.9 74.0 - 97.0 FL    MCH 26.9 24.0 - 34.0 PG    MCHC 30.0 (L) 31.0 - 37.0 g/dL    RDW 13.7 11.6 - 14.5 %    PLATELET 227 (L) 439 - 420 K/uL    MPV 11.3 9.2 - 11.8 FL    NEUTROPHILS 47 40 - 73 %    LYMPHOCYTES 34 21 - 52 %    MONOCYTES 12 (H) 3 - 10 %    EOSINOPHILS 6 (H) 0 - 5 %    BASOPHILS 1 0 - 2 %    ABS. NEUTROPHILS 2.3 1.8 - 8.0 K/UL    ABS. LYMPHOCYTES 1.7 0.9 - 3.6 K/UL    ABS. MONOCYTES 0.6 0.05 - 1.2 K/UL    ABS. EOSINOPHILS 0.3 0.0 - 0.4 K/UL    ABS. BASOPHILS 0.1 0.0 - 0.1 K/UL    DF AUTOMATED     URINALYSIS W/ RFLX MICROSCOPIC    Collection Time: 07/08/21  1:20 PM   Result Value Ref Range    Color DARK YELLOW      Appearance CLOUDY      Specific gravity 1.029 1.005 - 1.030      pH (UA) 5.0 5.0 - 8.0      Protein 30 (A) NEG mg/dL    Glucose Negative NEG mg/dL    Ketone TRACE (A) NEG mg/dL    Bilirubin SMALL (A) NEG      Blood LARGE (A) NEG      Urobilinogen 1.0 0.2 - 1.0 EU/dL    Nitrites Positive (A) NEG      Leukocyte Esterase MODERATE (A) NEG     URINE MICROSCOPIC ONLY    Collection Time: 07/08/21  1:20 PM   Result Value Ref Range    WBC 40 to 50 0 - 5 /hpf    RBC 5 to 10 0 - 5 /hpf    Epithelial cells 3+ 0 - 5 /lpf    Bacteria 4+ (A) NEG /hpf       Radiologic Studies -   CT ABD PELV WO CONT   Final Result      1. No significant diagnostic abnormality to explain patient's symptom. 2. Extensive calcified pleural plaques, suggestive of asbestos related pleural   disease. 3. Small hyperdense nodules in right kidney as probably hemorrhagic cysts. Small   hypodense nodule in left kidney is likely tiny renal cyst. Lack of IV contrast   and comparison limits the evaluation. 4. Extensive Paget's disease in the pelvis and multilevel mild compression   deformities in lumbar spine appear stable. Thank you for this referral.             Medical Decision Making   I am the first provider for this patient.     I reviewed the vital signs, available nursing notes, past medical history, past surgical history, family history and social history. Vital Signs-Reviewed the patient's vital signs. Records Reviewed: Nursing Notes and Old Medical Records     ED Course: Progress Notes, Reevaluation, and Consults:      Provider Notes (Medical Decision Making):   Patient nontoxic looking. No acute distress. Hemodynamically stable. Afebrile. Patient had normal white blood cell count. Renal function shows creatinine of 2.12, GFR 35. Patient has had poor kidney function in the past.  Slightly elevated from chemistry from February. Urine ordered which shows concern for UTI. Patient has nitrates and bacteria. Plan to treat patient for UTI. Patient given IV fluids as well as Rocephin. Patient made aware of results and advised to follow-up with his PCP. Son and patient are aware the patient has poor kidney function and needs retesting to monitor his function. CT scan of abdomen did not show any other acute intra-abdominal findings to suggest pyelo. Patient provided with follow-up with pain management. He was given a short course of pain medication. Patient also given antibiotics and Lidoderm patch. Patient advised to return anytime if his symptoms worsen. Patient and caregiver agreed with plan of care. Diagnosis     Clinical Impression:   1. Abdominal pain, generalized    2. Chronic low back pain without sciatica, unspecified back pain laterality    3.  Acute cystitis without hematuria        Disposition: home     Follow-up Information     Follow up With Specialties Details Why Contact Info    Ole Ovalle MD Internal Medicine Schedule an appointment as soon as possible for a visit   [address] [place] [address] [address] [facility] EMERGENCY DEPT Emergency Medicine  If symptoms worsen [address] [place] [address] [address] [facility]    Tamia Colón MD Physical Medicine and Rehabilitation Schedule an appointment as soon as possible for a visit in 1 day  [address] [facility]  [address] [address] [facility] [address]             Discharge Medication List as of 7/8/2021  4:41 PM      START taking these medications    Details   lidocaine (Lidoderm) 5 % Apply patch to the affected area for 12 hours a day and remove for 12 hours a day., Normal, Disp-30 Each, R-0      HYDROcodone-acetaminophen (Norco) 5-325 mg per tablet Take 1 Tablet by mouth every six (6) hours as needed for Pain for up to 3 days. Max Daily Amount: 4 Tablets., Normal, Disp-10 Tablet, R-0      cephALEXin (Keflex) 500 mg capsule Take 1 Capsule by mouth four (4) times daily for 10 days. , Normal, Disp-40 Capsule, R-0         CONTINUE these medications which have NOT CHANGED    Details   amoxicillin (AMOXIL) 500 mg capsule Historical Med      gabapentin (NEURONTIN) 300 mg capsule Historical Med      DUREZOL 0.05 % ophthalmic emulsion Historical Med, KEIRA      fentaNYL (DURAGESIC) 25 mcg/hr PATCH 1 Patch by TransDERmal route every seventy-two (72) hours. Max Daily Amount: 1 Patch., Print, Disp-10 Patch, R-0      guaiFENesin (ROBITUSSIN) 100 mg/5 mL liquid Take 5 mL by mouth every four (4) hours as needed for Cough. , Print, Disp-118 mL, R-0      therapeutic multivitamin (THERAGRAN) tablet Take 1 Tab by mouth daily. , Print, Disp-30 Tab, R-11      bisacodyl (DULCOLAX) 5 mg EC tablet Take 1 Tab by mouth daily as needed for Constipation. , Print, Disp-25 Tab, R-2      senna-docusate (PERICOLACE) 8.6-50 mg per tablet Take 2 Tabs by mouth daily. , Print, Disp-60 Tab, R-2      latanoprost (XALATAN) 0.005 % ophthalmic solution Administer 1 Drop to both eyes nightly., Historical Med      loteprednol etabonate (LOTEMAX) 0.5 % drpg Administer 1 Each to both eyes daily. (1) drop in both eyes every day, Historical Med      bromfenac (PROLENSA) 0.07 % ophthalmic solution Administer 1 Drop to both eyes daily. , Historical Med      polyvinyl alcohol-povidon,PF, (REFRESH CLASSIC, PF,) 1.4-0.6 % ophthalmic solution Administer 1-2 Drops to both eyes as needed (dryness). , Historical Med      fentaNYL (DURAGESIC) 75 mcg/hr Apply 1 patch every 3 days for chronic pain, Print, Disp-10 Patch, R-0      timolol (TIMOPTIC) 0.5 % ophthalmic solution Administer 1 Drop to both eyes two (2) times a day., Historical Med         STOP taking these medications       HYDROcodone-acetaminophen (NORCO)  mg tablet Comments:   Reason for Stopping:               Dictation disclaimer:  Please note that this dictation was completed with [employer], the [employer] voice recognition software. Quite often unanticipated grammatical, syntax, homophones, and other interpretive errors are inadvertently transcribed by the computer software. Please disregard these errors. Please excuse any errors that have escaped final proofreading.

## 2021-07-08 NOTE — ED TRIAGE NOTES
Client was previously with pain management. Lost treatment due to having unprescribed medication in system 3 weeks ago. Client has chronic back pain. Normal took hydrocodone.

## 2021-07-08 NOTE — DISCHARGE INSTRUCTIONS
Drink plenty of fluids. Take antibiotic as prescribed. Follow up with pain management as advised. Follow up with PCP for recheck of renal function.

## 2021-07-11 LAB
BACTERIA SPEC CULT: ABNORMAL
CC UR VC: ABNORMAL
SERVICE CMNT-IMP: ABNORMAL

## 2021-08-13 ENCOUNTER — APPOINTMENT (OUTPATIENT)
Dept: CT IMAGING | Age: 86
DRG: 682 | End: 2021-08-13
Attending: STUDENT IN AN ORGANIZED HEALTH CARE EDUCATION/TRAINING PROGRAM
Payer: MEDICARE

## 2021-08-13 ENCOUNTER — HOSPITAL ENCOUNTER (INPATIENT)
Age: 86
LOS: 4 days | Discharge: HOME HEALTH | DRG: 682 | End: 2021-08-17
Attending: STUDENT IN AN ORGANIZED HEALTH CARE EDUCATION/TRAINING PROGRAM | Admitting: STUDENT IN AN ORGANIZED HEALTH CARE EDUCATION/TRAINING PROGRAM
Payer: MEDICARE

## 2021-08-13 ENCOUNTER — APPOINTMENT (OUTPATIENT)
Dept: GENERAL RADIOLOGY | Age: 86
DRG: 682 | End: 2021-08-13
Attending: PHYSICIAN ASSISTANT
Payer: MEDICARE

## 2021-08-13 DIAGNOSIS — R62.7 FAILURE TO THRIVE IN ADULT: ICD-10-CM

## 2021-08-13 DIAGNOSIS — G93.41 ACUTE METABOLIC ENCEPHALOPATHY: ICD-10-CM

## 2021-08-13 DIAGNOSIS — N17.9 AKI (ACUTE KIDNEY INJURY) (HCC): ICD-10-CM

## 2021-08-13 DIAGNOSIS — Z71.89 GOALS OF CARE, COUNSELING/DISCUSSION: ICD-10-CM

## 2021-08-13 DIAGNOSIS — R53.81 DEBILITY: ICD-10-CM

## 2021-08-13 DIAGNOSIS — R41.82 ALTERED MENTAL STATUS, UNSPECIFIED ALTERED MENTAL STATUS TYPE: Primary | ICD-10-CM

## 2021-08-13 DIAGNOSIS — R54 ADVANCED AGE: ICD-10-CM

## 2021-08-13 LAB
ALBUMIN SERPL-MCNC: 3.4 G/DL (ref 3.4–5)
ALBUMIN/GLOB SERPL: 0.8 {RATIO} (ref 0.8–1.7)
ALP SERPL-CCNC: 164 U/L (ref 45–117)
ALT SERPL-CCNC: 23 U/L (ref 16–61)
ANION GAP SERPL CALC-SCNC: 8 MMOL/L (ref 3–18)
AST SERPL-CCNC: 41 U/L (ref 10–38)
BASOPHILS # BLD: 0.1 K/UL (ref 0–0.1)
BASOPHILS NFR BLD: 1 % (ref 0–2)
BILIRUB SERPL-MCNC: 1 MG/DL (ref 0.2–1)
BUN SERPL-MCNC: 52 MG/DL (ref 7–18)
BUN/CREAT SERPL: 19 (ref 12–20)
CALCIUM SERPL-MCNC: 9.2 MG/DL (ref 8.5–10.1)
CHLORIDE SERPL-SCNC: 112 MMOL/L (ref 100–111)
CK MB CFR SERPL CALC: NORMAL % (ref 0–4)
CK MB SERPL-MCNC: <1 NG/ML (ref 5–25)
CK SERPL-CCNC: 117 U/L (ref 39–308)
CO2 SERPL-SCNC: 19 MMOL/L (ref 21–32)
CREAT SERPL-MCNC: 2.73 MG/DL (ref 0.6–1.3)
DIFFERENTIAL METHOD BLD: ABNORMAL
EOSINOPHIL # BLD: 0.5 K/UL (ref 0–0.4)
EOSINOPHIL NFR BLD: 8 % (ref 0–5)
ERYTHROCYTE [DISTWIDTH] IN BLOOD BY AUTOMATED COUNT: 13.6 % (ref 11.6–14.5)
GLOBULIN SER CALC-MCNC: 4.5 G/DL (ref 2–4)
GLUCOSE SERPL-MCNC: 114 MG/DL (ref 74–99)
HCT VFR BLD AUTO: 41.5 % (ref 36–48)
HGB BLD-MCNC: 13.1 G/DL (ref 13–16)
LACTATE BLD-SCNC: 2.18 MMOL/L (ref 0.4–2)
LACTATE BLD-SCNC: 3.37 MMOL/L (ref 0.4–2)
LYMPHOCYTES # BLD: 2 K/UL (ref 0.9–3.6)
LYMPHOCYTES NFR BLD: 35 % (ref 21–52)
MAGNESIUM SERPL-MCNC: 2.7 MG/DL (ref 1.6–2.6)
MCH RBC QN AUTO: 27.3 PG (ref 24–34)
MCHC RBC AUTO-ENTMCNC: 31.6 G/DL (ref 31–37)
MCV RBC AUTO: 86.6 FL (ref 74–97)
MONOCYTES # BLD: 0.5 K/UL (ref 0.05–1.2)
MONOCYTES NFR BLD: 8 % (ref 3–10)
NEUTS SEG # BLD: 2.6 K/UL (ref 1.8–8)
NEUTS SEG NFR BLD: 47 % (ref 40–73)
PHOSPHATE SERPL-MCNC: 4 MG/DL (ref 2.5–4.9)
PLATELET # BLD AUTO: 139 K/UL (ref 135–420)
PMV BLD AUTO: 12.5 FL (ref 9.2–11.8)
POTASSIUM SERPL-SCNC: 4.7 MMOL/L (ref 3.5–5.5)
PROT SERPL-MCNC: 7.9 G/DL (ref 6.4–8.2)
RBC # BLD AUTO: 4.79 M/UL (ref 4.35–5.65)
SODIUM SERPL-SCNC: 139 MMOL/L (ref 136–145)
TROPONIN I SERPL-MCNC: 0.04 NG/ML (ref 0–0.04)
WBC # BLD AUTO: 5.6 K/UL (ref 4.6–13.2)

## 2021-08-13 PROCEDURE — 70450 CT HEAD/BRAIN W/O DYE: CPT

## 2021-08-13 PROCEDURE — 85025 COMPLETE CBC W/AUTO DIFF WBC: CPT

## 2021-08-13 PROCEDURE — 83605 ASSAY OF LACTIC ACID: CPT

## 2021-08-13 PROCEDURE — 65660000000 HC RM CCU STEPDOWN

## 2021-08-13 PROCEDURE — 99223 1ST HOSP IP/OBS HIGH 75: CPT | Performed by: STUDENT IN AN ORGANIZED HEALTH CARE EDUCATION/TRAINING PROGRAM

## 2021-08-13 PROCEDURE — 96361 HYDRATE IV INFUSION ADD-ON: CPT

## 2021-08-13 PROCEDURE — 84100 ASSAY OF PHOSPHORUS: CPT

## 2021-08-13 PROCEDURE — 87040 BLOOD CULTURE FOR BACTERIA: CPT

## 2021-08-13 PROCEDURE — 74011250636 HC RX REV CODE- 250/636: Performed by: STUDENT IN AN ORGANIZED HEALTH CARE EDUCATION/TRAINING PROGRAM

## 2021-08-13 PROCEDURE — 99285 EMERGENCY DEPT VISIT HI MDM: CPT

## 2021-08-13 PROCEDURE — 80053 COMPREHEN METABOLIC PANEL: CPT

## 2021-08-13 PROCEDURE — 96360 HYDRATION IV INFUSION INIT: CPT

## 2021-08-13 PROCEDURE — 96374 THER/PROPH/DIAG INJ IV PUSH: CPT

## 2021-08-13 PROCEDURE — 71045 X-RAY EXAM CHEST 1 VIEW: CPT

## 2021-08-13 PROCEDURE — 83735 ASSAY OF MAGNESIUM: CPT

## 2021-08-13 PROCEDURE — 93005 ELECTROCARDIOGRAM TRACING: CPT

## 2021-08-13 PROCEDURE — 82553 CREATINE MB FRACTION: CPT

## 2021-08-13 RX ORDER — SODIUM CHLORIDE 0.9 % (FLUSH) 0.9 %
5-10 SYRINGE (ML) INJECTION AS NEEDED
Status: DISCONTINUED | OUTPATIENT
Start: 2021-08-13 | End: 2021-08-16

## 2021-08-13 RX ORDER — SODIUM CHLORIDE 0.9 % (FLUSH) 0.9 %
5-40 SYRINGE (ML) INJECTION AS NEEDED
Status: DISCONTINUED | OUTPATIENT
Start: 2021-08-13 | End: 2021-08-17 | Stop reason: HOSPADM

## 2021-08-13 RX ORDER — CEFEPIME HYDROCHLORIDE 2 G/1
2 INJECTION, POWDER, FOR SOLUTION INTRAVENOUS EVERY 12 HOURS
Status: DISCONTINUED | OUTPATIENT
Start: 2021-08-13 | End: 2021-08-13

## 2021-08-13 RX ORDER — ONDANSETRON 4 MG/1
4 TABLET, ORALLY DISINTEGRATING ORAL
Status: DISCONTINUED | OUTPATIENT
Start: 2021-08-13 | End: 2021-08-17 | Stop reason: HOSPADM

## 2021-08-13 RX ORDER — SODIUM CHLORIDE 9 MG/ML
100 INJECTION, SOLUTION INTRAVENOUS CONTINUOUS
Status: DISPENSED | OUTPATIENT
Start: 2021-08-13 | End: 2021-08-14

## 2021-08-13 RX ORDER — ONDANSETRON 2 MG/ML
4 INJECTION INTRAMUSCULAR; INTRAVENOUS
Status: DISCONTINUED | OUTPATIENT
Start: 2021-08-13 | End: 2021-08-17 | Stop reason: HOSPADM

## 2021-08-13 RX ORDER — ACETAMINOPHEN 325 MG/1
650 TABLET ORAL
Status: DISCONTINUED | OUTPATIENT
Start: 2021-08-13 | End: 2021-08-17 | Stop reason: HOSPADM

## 2021-08-13 RX ORDER — HEPARIN SODIUM 5000 [USP'U]/ML
5000 INJECTION, SOLUTION INTRAVENOUS; SUBCUTANEOUS EVERY 8 HOURS
Status: DISCONTINUED | OUTPATIENT
Start: 2021-08-13 | End: 2021-08-15

## 2021-08-13 RX ORDER — SODIUM CHLORIDE 0.9 % (FLUSH) 0.9 %
5-40 SYRINGE (ML) INJECTION EVERY 8 HOURS
Status: DISCONTINUED | OUTPATIENT
Start: 2021-08-13 | End: 2021-08-17 | Stop reason: HOSPADM

## 2021-08-13 RX ORDER — POLYETHYLENE GLYCOL 3350 17 G/17G
17 POWDER, FOR SOLUTION ORAL DAILY PRN
Status: DISCONTINUED | OUTPATIENT
Start: 2021-08-13 | End: 2021-08-17 | Stop reason: HOSPADM

## 2021-08-13 RX ORDER — ACETAMINOPHEN 650 MG/1
650 SUPPOSITORY RECTAL
Status: DISCONTINUED | OUTPATIENT
Start: 2021-08-13 | End: 2021-08-17 | Stop reason: HOSPADM

## 2021-08-13 RX ORDER — CEFEPIME HYDROCHLORIDE 2 G/1
2 INJECTION, POWDER, FOR SOLUTION INTRAVENOUS ONCE
Status: COMPLETED | OUTPATIENT
Start: 2021-08-13 | End: 2021-08-13

## 2021-08-13 RX ADMIN — SODIUM CHLORIDE 1000 ML: 900 INJECTION, SOLUTION INTRAVENOUS at 16:44

## 2021-08-13 RX ADMIN — SODIUM CHLORIDE 1000 ML: 900 INJECTION, SOLUTION INTRAVENOUS at 19:28

## 2021-08-13 RX ADMIN — CEFEPIME HYDROCHLORIDE 2 G: 2 INJECTION, POWDER, FOR SOLUTION INTRAVENOUS at 17:00

## 2021-08-13 NOTE — ED PROVIDER NOTES
EMERGENCY DEPARTMENT HISTORY AND PHYSICAL EXAM    I have evaluated the patient at 4:25 PM      Date: 8/13/2021  Patient Name: Norman Banks    History of Presenting Illness     Chief Complaint   Patient presents with    Fatigue     Failure to thrive xapprox 3 weeks, noticably. Pt fell last night and appears more confused, per friend.  Altered mental status         History Provided By: Caregiver  Location/Duration/Severity/Modifying factors   80year-old male with past medical history of arthritis, chronic back pain, presenting to the emergency department for evaluation of altered mental status and failure to thrive. Patient was brought in by family for progressively worsening mental status and poor appetite. Patient is unable to provide me with any useful history. Per family, he is normally more alert and conversational.  They do not note any fever, cough or any other symptoms. Patient currently states he feels tired but offers no other complaints. Remainder of HPI is limited secondary to patient's mental status. PCP: Mer Arellano MD    Current Facility-Administered Medications   Medication Dose Route Frequency Provider Last Rate Last Admin    sodium chloride (NS) flush 5-10 mL  5-10 mL IntraVENous PRN HERBERTH Alexander        sodium chloride (NS) flush 5-10 mL  5-10 mL IntraVENous PRN Oziel Fenton,         sodium chloride 0.9 % bolus infusion 1,000 mL  1,000 mL IntraVENous ONCE Oziel Fenton,  1,000 mL/hr at 08/13/21 1928 1,000 mL at 08/13/21 1928     Current Outpatient Medications   Medication Sig Dispense Refill    lidocaine (Lidoderm) 5 % Apply patch to the affected area for 12 hours a day and remove for 12 hours a day. 30 Each 0    HYDROcodone-acetaminophen (Norco) 5-325 mg per tablet Take 1 Tablet by mouth every six (6) hours as needed for Pain for up to 3 days. Max Daily Amount: 4 Tablets.  10 Tablet 0    amoxicillin (AMOXIL) 500 mg capsule       gabapentin (NEURONTIN) 300 mg capsule       DUREZOL 0.05 % ophthalmic emulsion       fentaNYL (DURAGESIC) 25 mcg/hr PATCH 1 Patch by TransDERmal route every seventy-two (72) hours. Max Daily Amount: 1 Patch. 10 Patch 0    guaiFENesin (ROBITUSSIN) 100 mg/5 mL liquid Take 5 mL by mouth every four (4) hours as needed for Cough. 118 mL 0    therapeutic multivitamin (THERAGRAN) tablet Take 1 Tab by mouth daily. 30 Tab 11    bisacodyl (DULCOLAX) 5 mg EC tablet Take 1 Tab by mouth daily as needed for Constipation. 25 Tab 2    senna-docusate (PERICOLACE) 8.6-50 mg per tablet Take 2 Tabs by mouth daily. 60 Tab 2    latanoprost (XALATAN) 0.005 % ophthalmic solution Administer 1 Drop to both eyes nightly.  loteprednol etabonate (LOTEMAX) 0.5 % drpg Administer 1 Each to both eyes daily. (1) drop in both eyes every day      bromfenac (PROLENSA) 0.07 % ophthalmic solution Administer 1 Drop to both eyes daily.  polyvinyl alcohol-povidon,PF, (REFRESH CLASSIC, PF,) 1.4-0.6 % ophthalmic solution Administer 1-2 Drops to both eyes as needed (dryness).  fentaNYL (DURAGESIC) 75 mcg/hr Apply 1 patch every 3 days for chronic pain 10 Patch 0    timolol (TIMOPTIC) 0.5 % ophthalmic solution Administer 1 Drop to both eyes two (2) times a day.          Past History     Past Medical History:  Past Medical History:   Diagnosis Date    Arthritis     Back pain, chronic        Past Surgical History:  Past Surgical History:   Procedure Laterality Date    HX HEENT      relieved pressure R eye 1/2014       Family History:  Family History   Problem Relation Age of Onset    Diabetes Father     No Known Problems Mother        Social History:  Social History     Tobacco Use    Smoking status: Never Smoker    Smokeless tobacco: Never Used   Substance Use Topics    Alcohol use: No     Alcohol/week: 0.8 standard drinks     Types: 1 Standard drinks or equivalent per week    Drug use: No       Allergies:  No Known Allergies      Review of Systems       Review of Systems   Unable to perform ROS: Mental status change         Physical Exam     Visit Vitals  /67   Pulse 98   Temp 97.3 °F (36.3 °C)   Resp 14   Ht 6' 1\" (1.854 m)   Wt 52.2 kg (115 lb)   SpO2 100%   BMI 15.17 kg/m²         Physical Exam  Constitutional:       General: He is not in acute distress. Appearance: He is not toxic-appearing. Comments: Patient appears dehydrated, frail, cachectic but in no acute distress. He is awake and alert   HENT:      Head: Normocephalic and atraumatic. Mouth/Throat:      Mouth: Mucous membranes are moist.   Eyes:      Extraocular Movements: Extraocular movements intact. Pupils: Pupils are equal, round, and reactive to light. Comments: Sunken eyes   Cardiovascular:      Rate and Rhythm: Regular rhythm. Tachycardia present. Heart sounds: Normal heart sounds. No murmur heard. No friction rub. No gallop. Pulmonary:      Effort: Pulmonary effort is normal.      Breath sounds: Normal breath sounds. Abdominal:      General: There is no distension. Palpations: Abdomen is soft. There is no mass. Tenderness: There is no abdominal tenderness. There is no guarding. Hernia: No hernia is present. Musculoskeletal:         General: No swelling, tenderness or deformity. Cervical back: Normal range of motion and neck supple. Skin:     General: Skin is warm and dry. Capillary Refill: Capillary refill takes more than 3 seconds. Coloration: Skin is pale. Findings: No rash. Neurological:      General: No focal deficit present. Mental Status: He is alert. He is confused. Cranial Nerves: No cranial nerve deficit or dysarthria. Sensory: No sensory deficit. Psychiatric:         Mood and Affect: Mood normal. Mood is not anxious.            Diagnostic Study Results     Labs -  Recent Results (from the past 12 hour(s))   METABOLIC PANEL, COMPREHENSIVE    Collection Time: 08/13/21  4:20 PM Result Value Ref Range    Sodium 139 136 - 145 mmol/L    Potassium 4.7 3.5 - 5.5 mmol/L    Chloride 112 (H) 100 - 111 mmol/L    CO2 19 (L) 21 - 32 mmol/L    Anion gap 8 3.0 - 18 mmol/L    Glucose 114 (H) 74 - 99 mg/dL    BUN 52 (H) 7.0 - 18 MG/DL    Creatinine 2.73 (H) 0.6 - 1.3 MG/DL    BUN/Creatinine ratio 19 12 - 20      GFR est AA 26 (L) >60 ml/min/1.73m2    GFR est non-AA 22 (L) >60 ml/min/1.73m2    Calcium 9.2 8.5 - 10.1 MG/DL    Bilirubin, total 1.0 0.2 - 1.0 MG/DL    ALT (SGPT) 23 16 - 61 U/L    AST (SGOT) 41 (H) 10 - 38 U/L    Alk. phosphatase 164 (H) 45 - 117 U/L    Protein, total 7.9 6.4 - 8.2 g/dL    Albumin 3.4 3.4 - 5.0 g/dL    Globulin 4.5 (H) 2.0 - 4.0 g/dL    A-G Ratio 0.8 0.8 - 1.7     CBC WITH AUTOMATED DIFF    Collection Time: 08/13/21  4:20 PM   Result Value Ref Range    WBC 5.6 4.6 - 13.2 K/uL    RBC 4.79 4.35 - 5.65 M/uL    HGB 13.1 13.0 - 16.0 g/dL    HCT 41.5 36.0 - 48.0 %    MCV 86.6 74.0 - 97.0 FL    MCH 27.3 24.0 - 34.0 PG    MCHC 31.6 31.0 - 37.0 g/dL    RDW 13.6 11.6 - 14.5 %    PLATELET 501 214 - 056 K/uL    MPV 12.5 (H) 9.2 - 11.8 FL    NEUTROPHILS 47 40 - 73 %    LYMPHOCYTES 35 21 - 52 %    MONOCYTES 8 3 - 10 %    EOSINOPHILS 8 (H) 0 - 5 %    BASOPHILS 1 0 - 2 %    ABS. NEUTROPHILS 2.6 1.8 - 8.0 K/UL    ABS. LYMPHOCYTES 2.0 0.9 - 3.6 K/UL    ABS. MONOCYTES 0.5 0.05 - 1.2 K/UL    ABS. EOSINOPHILS 0.5 (H) 0.0 - 0.4 K/UL    ABS.  BASOPHILS 0.1 0.0 - 0.1 K/UL    DF AUTOMATED     CARDIAC PANEL,(CK, CKMB & TROPONIN)    Collection Time: 08/13/21  4:20 PM   Result Value Ref Range    CK - MB <1.0 <3.6 ng/ml    CK-MB Index  0.0 - 4.0 %     CALCULATION NOT PERFORMED WHEN RESULT IS BELOW LINEAR LIMIT     39 - 308 U/L    Troponin-I, QT 0.04 0.0 - 0.045 NG/ML   MAGNESIUM    Collection Time: 08/13/21  4:20 PM   Result Value Ref Range    Magnesium 2.7 (H) 1.6 - 2.6 mg/dL   PHOSPHORUS    Collection Time: 08/13/21  4:20 PM   Result Value Ref Range    Phosphorus 4.0 2.5 - 4.9 MG/DL   POC LACTIC ACID    Collection Time: 08/13/21  4:23 PM   Result Value Ref Range    Lactic Acid (POC) 3.37 (HH) 0.40 - 2.00 mmol/L       Radiologic Studies -   CT HEAD WO CONT   Final Result      1. No acute findings. 2. Stable appearance of the brain with chronic microvascular disease, atrophy   and chronic ventriculomegaly. 3. Stable chronic heterogeneous sclerotic marrow. Correlate with history of   prior metastatic disease. No change since 2007. XR CHEST PORT    (Results Pending)         Medical Decision Making   I am the first provider for this patient. I reviewed the vital signs, available nursing notes, past medical history, past surgical history, family history and social history. Vital Signs-Reviewed the patient's vital signs. EKG: Sinus tachycardia with a regular rate of 141. No obvious signs of acute ischemia. Normal intervals. Records Reviewed: Nursing Notes, Old Medical Records, Previous electrocardiograms, Previous Radiology Studies and Previous Laboratory Studies (Time of Review: 4:25 PM)    ED Course: Progress Notes, Reevaluation, and Consults:         Provider Notes (Medical Decision Making):   MDM  Number of Diagnoses or Management Options  Diagnosis management comments: 75-year-old male presenting for failure to thrive and altered mental status. He is hypotensive in triage with a pressure of 60/41 and tachycardic in the 140s. He does appear frail and cachectic. Code sepsis called considering his tachycardia and his hypotension. IV fluids immediately started. Patient has been given cefepime for biotic coverage. Also obtain cardiac enzymes and CT of the head. POC lactate of 3.37.      1840:  Patient's blood work is remarkable for an JULIET. Creatinine today is 2.7 (2.1 in the middle of July) patient's blood work in general demonstrates dehydration. He is improved after receiving 2 L of fluids. He currently with a heart rate of 95 and a blood pressure 142/82.   He is still altered at this time. CT of head was performed which was negative for any acute abnormalities. He is still pending urinalysis at this time. Critical Care Time:  The services I provided to this patient were to treat and/or prevent clinically significant deterioration that could result in the failure of one or more body systems and/or organ systems due to septic shock. Services included the following:  -reviewing nursing notes and old charts  -vital sign assessments  -direct patient care  -medication orders and management  -interpreting and reviewing diagnostic studies/labs  -re-evaluations  -documentation time    Aggregate critical care time was 45 minutes, which includes only time during which I was engaged in work directly related to the patient's care as described above, whether I was at bedside or elsewhere in the Emergency Department. It did not include time spent performing other reported procedures or the services of residents, students, nurses, or advance practice providers. Sukhi Pritchett DO    7:44 PM         Diagnosis     Clinical Impression: No diagnosis found. Disposition: admitted    Follow-up Information    None          Patient's Medications   Start Taking    No medications on file   Continue Taking    AMOXICILLIN (AMOXIL) 500 MG CAPSULE        BISACODYL (DULCOLAX) 5 MG EC TABLET    Take 1 Tab by mouth daily as needed for Constipation. BROMFENAC (PROLENSA) 0.07 % OPHTHALMIC SOLUTION    Administer 1 Drop to both eyes daily. DUREZOL 0.05 % OPHTHALMIC EMULSION        FENTANYL (DURAGESIC) 25 MCG/HR PATCH    1 Patch by TransDERmal route every seventy-two (72) hours. Max Daily Amount: 1 Patch. FENTANYL (DURAGESIC) 75 MCG/HR    Apply 1 patch every 3 days for chronic pain    GABAPENTIN (NEURONTIN) 300 MG CAPSULE        GUAIFENESIN (ROBITUSSIN) 100 MG/5 ML LIQUID    Take 5 mL by mouth every four (4) hours as needed for Cough.     HYDROCODONE-ACETAMINOPHEN (NORCO) 5-325 MG PER TABLET Take 1 Tablet by mouth every six (6) hours as needed for Pain for up to 3 days. Max Daily Amount: 4 Tablets. LATANOPROST (XALATAN) 0.005 % OPHTHALMIC SOLUTION    Administer 1 Drop to both eyes nightly. LIDOCAINE (LIDODERM) 5 %    Apply patch to the affected area for 12 hours a day and remove for 12 hours a day. LOTEPREDNOL ETABONATE (LOTEMAX) 0.5 % DRPG    Administer 1 Each to both eyes daily. (1) drop in both eyes every day    POLYVINYL ALCOHOL-POVIDON,PF, (REFRESH CLASSIC, PF,) 1.4-0.6 % OPHTHALMIC SOLUTION    Administer 1-2 Drops to both eyes as needed (dryness). SENNA-DOCUSATE (PERICOLACE) 8.6-50 MG PER TABLET    Take 2 Tabs by mouth daily. THERAPEUTIC MULTIVITAMIN (THERAGRAN) TABLET    Take 1 Tab by mouth daily. TIMOLOL (TIMOPTIC) 0.5 % OPHTHALMIC SOLUTION    Administer 1 Drop to both eyes two (2) times a day. These Medications have changed    No medications on file   Stop Taking    No medications on file     Disclaimer: Sections of this note are dictated using utilizing voice recognition software. Minor typographical errors may be present. If questions arise, please do not hesitate to contact me or call our department.

## 2021-08-13 NOTE — ED NOTES
Assume care of patient, patient alert and oriented x 4, patient placed on monitor, ST noted at this time, all other VS stable.   Purposeful rounding completed:    Side rails up x 2:  YES  Bed in low position and wheels locked: YES  Call bell within reach: YES  Comfort addressed: YES    Toileting needs addressed: YES  Plan of care reviewed/updated with patient and or family members: YES  IV site assessed: YES  Pain assessed and addressed: YES, 0

## 2021-08-13 NOTE — ED NOTES
I performed a brief history of the patient here in triage and I have determined that pt will need further treatment and evaluation from the main side ER physician or SANDRA. I have placed initial orders based on the history to help in expediting patients care.        Visit Vitals  BP (!) 60/41 (BP 1 Location: Left upper arm, BP Patient Position: At rest)   Pulse (!) 136   Temp 97.3 °F (36.3 °C)   Resp 20   Ht 6' 1\" (1.854 m)   Wt 52.2 kg (115 lb)   SpO2 99%   BMI 15.17 kg/m²      HERBERTH Johnson 4:19 PM

## 2021-08-14 PROBLEM — E43 SEVERE PROTEIN-CALORIE MALNUTRITION (HCC): Status: ACTIVE | Noted: 2021-08-14

## 2021-08-14 LAB
ANION GAP SERPL CALC-SCNC: 10 MMOL/L (ref 3–18)
ATRIAL RATE: 141 BPM
BUN SERPL-MCNC: 44 MG/DL (ref 7–18)
BUN/CREAT SERPL: 22 (ref 12–20)
CALCIUM SERPL-MCNC: 8 MG/DL (ref 8.5–10.1)
CALCULATED P AXIS, ECG09: 86 DEGREES
CALCULATED R AXIS, ECG10: -55 DEGREES
CALCULATED T AXIS, ECG11: 76 DEGREES
CHLORIDE SERPL-SCNC: 121 MMOL/L (ref 100–111)
CO2 SERPL-SCNC: 16 MMOL/L (ref 21–32)
CREAT SERPL-MCNC: 2.03 MG/DL (ref 0.6–1.3)
DIAGNOSIS, 93000: NORMAL
ERYTHROCYTE [DISTWIDTH] IN BLOOD BY AUTOMATED COUNT: 13.6 % (ref 11.6–14.5)
GLUCOSE SERPL-MCNC: 65 MG/DL (ref 74–99)
HCT VFR BLD AUTO: 33.9 % (ref 36–48)
HGB BLD-MCNC: 10 G/DL (ref 13–16)
LACTATE BLD-SCNC: 1.45 MMOL/L (ref 0.4–2)
MCH RBC QN AUTO: 27 PG (ref 24–34)
MCHC RBC AUTO-ENTMCNC: 29.5 G/DL (ref 31–37)
MCV RBC AUTO: 91.4 FL (ref 74–97)
P-R INTERVAL, ECG05: 124 MS
PLATELET # BLD AUTO: 104 K/UL (ref 135–420)
PMV BLD AUTO: 11.8 FL (ref 9.2–11.8)
POTASSIUM SERPL-SCNC: 4.7 MMOL/L (ref 3.5–5.5)
Q-T INTERVAL, ECG07: 288 MS
QRS DURATION, ECG06: 100 MS
QTC CALCULATION (BEZET), ECG08: 441 MS
RBC # BLD AUTO: 3.71 M/UL (ref 4.35–5.65)
SODIUM SERPL-SCNC: 147 MMOL/L (ref 136–145)
VENTRICULAR RATE, ECG03: 141 BPM
WBC # BLD AUTO: 6 K/UL (ref 4.6–13.2)

## 2021-08-14 PROCEDURE — 36415 COLL VENOUS BLD VENIPUNCTURE: CPT

## 2021-08-14 PROCEDURE — 65660000000 HC RM CCU STEPDOWN

## 2021-08-14 PROCEDURE — 83605 ASSAY OF LACTIC ACID: CPT

## 2021-08-14 PROCEDURE — 99232 SBSQ HOSP IP/OBS MODERATE 35: CPT | Performed by: EMERGENCY MEDICINE

## 2021-08-14 PROCEDURE — 80048 BASIC METABOLIC PNL TOTAL CA: CPT

## 2021-08-14 PROCEDURE — 74011250636 HC RX REV CODE- 250/636: Performed by: STUDENT IN AN ORGANIZED HEALTH CARE EDUCATION/TRAINING PROGRAM

## 2021-08-14 PROCEDURE — 85027 COMPLETE CBC AUTOMATED: CPT

## 2021-08-14 PROCEDURE — 74011000250 HC RX REV CODE- 250: Performed by: EMERGENCY MEDICINE

## 2021-08-14 PROCEDURE — 2709999900 HC NON-CHARGEABLE SUPPLY

## 2021-08-14 PROCEDURE — 92610 EVALUATE SWALLOWING FUNCTION: CPT

## 2021-08-14 PROCEDURE — 97162 PT EVAL MOD COMPLEX 30 MIN: CPT

## 2021-08-14 RX ORDER — DEXTROSE MONOHYDRATE AND SODIUM CHLORIDE 5; .45 G/100ML; G/100ML
60 INJECTION, SOLUTION INTRAVENOUS CONTINUOUS
Status: DISCONTINUED | OUTPATIENT
Start: 2021-08-14 | End: 2021-08-15

## 2021-08-14 RX ADMIN — HEPARIN SODIUM 5000 UNITS: 5000 INJECTION INTRAVENOUS; SUBCUTANEOUS at 16:57

## 2021-08-14 RX ADMIN — SODIUM CHLORIDE 100 ML/HR: 900 INJECTION, SOLUTION INTRAVENOUS at 02:02

## 2021-08-14 RX ADMIN — Medication 10 ML: at 06:19

## 2021-08-14 RX ADMIN — HEPARIN SODIUM 5000 UNITS: 5000 INJECTION INTRAVENOUS; SUBCUTANEOUS at 06:17

## 2021-08-14 RX ADMIN — Medication 10 ML: at 22:00

## 2021-08-14 RX ADMIN — Medication 10 ML: at 16:58

## 2021-08-14 RX ADMIN — DEXTROSE MONOHYDRATE AND SODIUM CHLORIDE 60 ML/HR: 5; .45 INJECTION, SOLUTION INTRAVENOUS at 16:52

## 2021-08-14 RX ADMIN — Medication 10 ML: at 02:02

## 2021-08-14 NOTE — PROGRESS NOTES
SPEECH LANGUAGE PATHOLOGY BEDSIDE SWALLOW EVALUATION    Patient: Chyna Mcgee (59 y.o. male)  Date: 8/14/2021  Primary Diagnosis: Failure to thrive in adult [R62.7]  Altered mental status [R41.82]        Precautions: aspiration precautions       PLOF: see care plan    ASSESSMENT :  Based on the objective data described below, the patient presents with mild oropharyngeal dysphagia. Bedside swallow evaluation completed. Pt easily aroused to verbal stimulus. Oral motor examination revealed pt edentulous, weak labial seal, and decreased lingual strength. Pt presented with ice chips/thin/puree/solid bolus trials. Pt with weak, delayed cough post thin liquid trial. Pt demo weak bolus cohesion with solid trials and delayed initiation of swallow. Recommending mechanical soft/thin liquid diet with strict aspiration precautions in place. SLP will continue to follow for dysphagia management. Patient will benefit from skilled intervention to address the above impairments. Patient's rehabilitation potential is considered to be Good  Factors which may influence rehabilitation potential include:   []            None noted  [x]            Mental ability/status  [x]            Medical condition  []            Home/family situation and support systems  [x]            Safety awareness  []            Pain tolerance/management  []            Other:      PLAN :  Recommendations and Planned Interventions:  See above care plan  Frequency/Duration: Patient will be followed by speech-language pathology 1-2 times per day/4-7 days per week to address goals. Discharge Recommendations: Skilled Nursing Facility     SUBJECTIVE:   Patient stated that tastes good.     OBJECTIVE:     Past Medical History:   Diagnosis Date    Arthritis     Back pain, chronic      Past Surgical History:   Procedure Laterality Date    HX HEENT      relieved pressure R eye 1/2014     Home Situation:   Home Situation  Support Systems: Spouse/Significant Other/Partner    Diet prior to admission: regular/thin  Current Diet:  mechanical soft/thin     Cognitive and Communication Status:  Neurologic State: Confused  Orientation Level: Oriented to person  Cognition: Follows commands, Impaired decision making           Oral Assessment:  Oral Assessment  Labial: Decreased rate;Decreased seal  Dentition: Edentulous  Lingual: Decreased rate;Decreased strength  Velum: Unable to visualize  Mandible: Restricted  P.O. Trials:     Vocal quality prior to P.O.: No impairment  Consistency Presented: Thin liquid; Solid;Puree  How Presented: SLP-fed/presented;Self-fed/presented;Straw;Spoon     Bolus Acceptance: No impairment  Bolus Formation/Control: No impairment     Propulsion: Delayed (# of seconds)  Oral Residue: Less than 10% of bolus  Initiation of Swallow: No impairment  Laryngeal Elevation: Functional  Aspiration Signs/Symptoms: Weak cough  Pharyngeal Phase Characteristics: Suspected pharyngeal residue  Effective Modifications: Alternate liquids/solids  Cues for Modifications: Moderate               PAIN:  Pain level pre-treatment: 0/10   Pain level post-treatment: 0/10     After treatment:   []            Patient left in no apparent distress sitting up in chair  [x]            Patient left in no apparent distress in bed  []            Call bell left within reach  [x]            Nursing notified  []            Family present  []            Caregiver present  []            Bed alarm activated    COMMUNICATION/EDUCATION:   [x]            Aspiration precautions; swallow safety; compensatory techniques. []            Patient/family have participated as able in goal setting and plan of care. []            Patient/family agree to work toward stated goals and plan of care. []            Patient understands intent and goals of therapy; neutral about participation.   []            Patient unable to participate in goal setting/plan of care; educ ongoing with interdisciplinary staff  [] Posted safety precautions in patient's room.     Thank you for this referral,  [place], SLP  Time Calculation: 12 mins

## 2021-08-14 NOTE — REMOTE MONITORING
Spoke with primary RN Taylor Joseph regarding 6 hour Sepsis bundle.     Thank you,     MASON GrimaldoN RN   [address] [facility]  Callback # [phone]

## 2021-08-14 NOTE — H&P
History & Physical    Patient: Radha Schmid MRN: [id number]  CSN: [id number]    YOB: 1927  Age: 80 y.o. Sex: male      DOA: 8/13/2021    Chief Complaint:   Chief Complaint   Patient presents with    Fatigue     Failure to thrive xapprox 3 weeks, noticably. Pt fell last night and appears more confused, per friend.  Altered mental status          HPI:     Radha Schmid is a 80 y.o.  male with hx of Paget's disease of the bone, arthritis, chronic back pain, CKD stage 4. Patient presented from home with his family. Family not present at time of admission. Girlfriend contacted for assistance with HPI as patient is currently not speaking. Per girlfriend, patient has become gradually more confused and more unsteady on feet. He has been sleeping more throughout the day. And has had poor appetite. She has been unable to get him to eat or drink. She has noticed that he has not urinated often over the past couple of days. Girlfriend states that at home he is conversational.  Family does not believe he has been febrile. He was seen in the emergency department for urinary tract infection in July. Girlfriend states that patient refused to take his antibiotic. Girlfriend does not know patient's CODE STATUS and other family could not be reached at this time.     Past Medical History:   Diagnosis Date    Arthritis     Back pain, chronic        Past Surgical History:   Procedure Laterality Date    HX HEENT      relieved pressure R eye 1/2014       Family History   Problem Relation Age of Onset    Diabetes Father     No Known Problems Mother        Social History     Socioeconomic History    Marital status:      Spouse name: Not on file    Number of children: Not on file    Years of education: Not on file    Highest education level: Not on file   Tobacco Use    Smoking status: Never Smoker    Smokeless tobacco: Never Used   Substance and Sexual Activity    Alcohol use: No     Alcohol/week: 0.8 standard drinks     Types: 1 Standard drinks or equivalent per week    Drug use: No     Social Determinants of Health     Financial Resource Strain:     Difficulty of Paying Living Expenses:    Food Insecurity:     Worried About Running Out of Food in the Last Year:     [address] in the Last Year:    Transportation Needs:     Lack of Transportation (Medical):  Lack of Transportation (Non-Medical):    Physical Activity:     Days of Exercise per Week:     Minutes of Exercise per Session:    Stress:     Feeling of Stress :    Social Connections:     Frequency of Communication with Friends and Family:     Frequency of Social Gatherings with Friends and Family:     Attends [employer] Services:     Active Member of Clubs or Organizations:     Attends Club or Organization Meetings:     Marital Status:        Prior to Admission medications    Medication Sig Start Date End Date Taking? Authorizing Provider   gabapentin (NEURONTIN) 300 mg capsule Take 300 mg by mouth three (3) times daily. Indications: neuropathic pain 18   Provider, Historical   bisacodyl (DULCOLAX) 5 mg EC tablet Take 1 Tab by mouth daily as needed for Constipation. 18   Mabel Olson MD   timolol (TIMOPTIC) 0.5 % ophthalmic solution Administer 1 Drop to both eyes two (2) times a day. 3/9/16   Provider, Historical       No Known Allergies      Review of Systems  Cannot be obtained as patient is currently altered and not speaking. Physical Exam:     Physical Exam:  Visit Vitals  /79   Pulse 98   Temp 98.3 °F (36.8 °C)   Resp 14   Ht 6' 1\" (1.854 m)   Wt 52.2 kg (115 lb)   SpO2 99%   BMI 15.17 kg/m²      O2 Device: None (Room air)    Temp (24hrs), Av.8 °F (36.6 °C), Min:97.3 °F (36.3 °C), Max:98.3 °F (36.8 °C)     1901 -  0700  In:  [I.V.:]  Out: -    No intake/output data recorded. General:   No acute distress, cachectic. Lethargic. Head: Normocephalic, without obvious abnormality, atraumatic. Eyes:  Conjunctivae/corneas clear. PERRL, EOMs intact. Sunken. Nose: Nares normal.    Neck: Supple, symmetrical, trachea midline   Lungs:   Clear to auscultation bilaterally. Heart:   Tachycardic and normal rhythm, S1, S2 normal.     Abdomen: Soft, nondistended. Extremities:  Frail. No trauma   Pulses: 2+ and symmetric all extremities. Skin:  Prolonged capillary refill of over 3 seconds   Neurologic: AAOx0, patient currently nonverbal       Labs Reviewed: All lab results for the last 24 hours reviewed. Recent Results (from the past 24 hour(s))   METABOLIC PANEL, COMPREHENSIVE    Collection Time: 08/13/21  4:20 PM   Result Value Ref Range    Sodium 139 136 - 145 mmol/L    Potassium 4.7 3.5 - 5.5 mmol/L    Chloride 112 (H) 100 - 111 mmol/L    CO2 19 (L) 21 - 32 mmol/L    Anion gap 8 3.0 - 18 mmol/L    Glucose 114 (H) 74 - 99 mg/dL    BUN 52 (H) 7.0 - 18 MG/DL    Creatinine 2.73 (H) 0.6 - 1.3 MG/DL    BUN/Creatinine ratio 19 12 - 20      GFR est AA 26 (L) >60 ml/min/1.73m2    GFR est non-AA 22 (L) >60 ml/min/1.73m2    Calcium 9.2 8.5 - 10.1 MG/DL    Bilirubin, total 1.0 0.2 - 1.0 MG/DL    ALT (SGPT) 23 16 - 61 U/L    AST (SGOT) 41 (H) 10 - 38 U/L    Alk. phosphatase 164 (H) 45 - 117 U/L    Protein, total 7.9 6.4 - 8.2 g/dL    Albumin 3.4 3.4 - 5.0 g/dL    Globulin 4.5 (H) 2.0 - 4.0 g/dL    A-G Ratio 0.8 0.8 - 1.7     CBC WITH AUTOMATED DIFF    Collection Time: 08/13/21  4:20 PM   Result Value Ref Range    WBC 5.6 4.6 - 13.2 K/uL    RBC 4.79 4.35 - 5.65 M/uL    HGB 13.1 13.0 - 16.0 g/dL    HCT 41.5 36.0 - 48.0 %    MCV 86.6 74.0 - 97.0 FL    MCH 27.3 24.0 - 34.0 PG    MCHC 31.6 31.0 - 37.0 g/dL    RDW 13.6 11.6 - 14.5 %    PLATELET 218 578 - 427 K/uL    MPV 12.5 (H) 9.2 - 11.8 FL    NEUTROPHILS 47 40 - 73 %    LYMPHOCYTES 35 21 - 52 %    MONOCYTES 8 3 - 10 %    EOSINOPHILS 8 (H) 0 - 5 %    BASOPHILS 1 0 - 2 %    ABS.  NEUTROPHILS 2.6 1.8 - 8.0 K/UL    ABS. LYMPHOCYTES 2.0 0.9 - 3.6 K/UL    ABS. MONOCYTES 0.5 0.05 - 1.2 K/UL    ABS. EOSINOPHILS 0.5 (H) 0.0 - 0.4 K/UL    ABS. BASOPHILS 0.1 0.0 - 0.1 K/UL    DF AUTOMATED     CARDIAC PANEL,(CK, CKMB & TROPONIN)    Collection Time: 08/13/21  4:20 PM   Result Value Ref Range    CK - MB <1.0 <3.6 ng/ml    CK-MB Index  0.0 - 4.0 %     CALCULATION NOT PERFORMED WHEN RESULT IS BELOW LINEAR LIMIT     39 - 308 U/L    Troponin-I, QT 0.04 0.0 - 0.045 NG/ML   MAGNESIUM    Collection Time: 08/13/21  4:20 PM   Result Value Ref Range    Magnesium 2.7 (H) 1.6 - 2.6 mg/dL   PHOSPHORUS    Collection Time: 08/13/21  4:20 PM   Result Value Ref Range    Phosphorus 4.0 2.5 - 4.9 MG/DL   POC LACTIC ACID    Collection Time: 08/13/21  4:23 PM   Result Value Ref Range    Lactic Acid (POC) 3.37 (HH) 0.40 - 2.00 mmol/L   POC LACTIC ACID    Collection Time: 08/13/21  8:28 PM   Result Value Ref Range    Lactic Acid (POC) 2.18 (HH) 0.40 - 2.00 mmol/L        XR Results (most recent):  Results from Hospital Encounter encounter on 01/25/18    XR CHEST SNGL V    Narrative  Portable chest    History: Bronchitis. Worsening shortness of breath. Dehydration. Comparison: Chest and thoracic spine radiographs 11/18/2007, 11/30/2009,  12/13/2012 and 4/15/2013; CT abdomen pelvis 7/6/2014    Findings: The cardiomediastinal silhouette is within normal limits. The pulmonary  vasculature is unremarkable. Chronic biapical pleural thickening. Dense nodules  scattered throughout the lungs are more prominent than prior exams, particularly  within the right midlung. Dense nodules in the left hemithorax are similar to  2007 and at least in part represent calcified pleural plaques demonstrated on  lung bases of prior abdomen pelvis CT. Nodules in the right hemithorax appear  more dense, confluent and more numerous than prior exams and may represent  superimposed infection.   Bronchial wall thickening also demonstrated in the  infrahilar right lung. No pleural effusion or pneumothorax. No acute osseous  abnormality. 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 Recommend follow-up to resolution. Thank you for this referral.        CT Results  (Last 48 hours)               08/13/21 1728  CT HEAD WO CONT Final result    Impression:      1. No acute findings. 2. Stable appearance of the brain with chronic microvascular disease, atrophy   and chronic ventriculomegaly. 3. Stable chronic heterogeneous sclerotic marrow. Correlate with history of   prior metastatic disease. No change since 2007. Narrative:  CT OF THE HEAD WITHOUT CONTRAST. CLINICAL HISTORY: Altered mental status. TECHNIQUE: Helical scan obtained of the head were obtained from the skull vertex   through the base of the skull without intravenous contrast.    All CT scans are   performed using dose optimization techniques as appropriate to the performed   exam including the following: Automated exposure control, adjustment of mA   and/or kV according to patient size, and use of iterative reconstructive   technique. COMPARISON: 5/10/2014. FINDINGS:        Chronic cortical moderate atrophy. Compensatory marked ventriculomegaly is   chronic. Periventricular and deep white matter hypodensities are mild and   chronic. No intracranial hemorrhage. No mass effect. Chronic opacification of a   single right mastoid air cell. Upper paranasal sinuses and mastoid air cells are   otherwise clear. Heterogeneous appearance of the marrow with mixed sclerotic   regions appear chronic and fairly stable dating back to 2007. Interval calvarial   resection at midline frontal bone. No acute fractures.                    Procedures/imaging: see electronic medical records for all procedures/Xrays and details which were not copied into this note but were reviewed prior to creation of Plan      Assessment/Plan     Active Problems:    Altered mental status (8/13/2021)      Failure to thrive in adult (8/13/2021)       Assessment  1. Altered mentation  2. Dehydration  3. Failure to thrive in adult  4. Severe protein calorie malnutrition  5. Advanced age   #1,5: Admit to telemetry. IV fluids. Nutrition consult. Sepsis work-up. Follow-up on blood cultures and urinalysis. Palliative care consultation. SLP for bedside swallow eval. CXR clear by my read. 6. Recent urinary tract infection        -Follow-up on urinalysis. Patient with a E. coli on urine culture from July 8. Isolate was pan sensitive for all except ampicillin and Unasyn. Patient was prescribed Keflex but did not take treatment. Plan  1. Monitor vital signs, weight per unit protocol  2. PT, OT eval and treat  3. Fall, aspiration precautions  4. Consult CM to assist w/ dc planning    Diet: NPO   DVT/GI Prophylaxis: Hep SQ  Code Status: Full     Contact: Girlfriend Lexus Austin - [phone]    Discussed with patient at bedside about hospital admission and plan care. He understands and agrees. All questions answered. Disclaimer: Sections of this note are dictated using utilizing voice recognition software. Minor typographical errors may be present. If questions arise, please do not hesitate to contact me or call our department.         Ana Roe, [facility]  08/13/21  10:29 PM

## 2021-08-14 NOTE — PROGRESS NOTES
[facility] Hospitalist Group  Progress Note    Patient: Ann Copeland Age: 80 y.o. : 3/14/1927 MR#: [id number] SSN: [id number]  Date/Time: 2021    Subjective:     Patient is laying in bed in no apparent distress, awake and alert. Friend is at bedside. Assessment/Plan:     Acute metabolic encephalopathy in the setting of dehydration   chronic kidney disease stage IV which is a little worse  Advanced age  Failure to thrive  Lactic acidosis at admissionimproved  Dysphagia    Plan  Continue IV fluids, diet as per speech  Monitor renal function  Nutrition evaluation  PT and OT  I tried to call patient's son at phone number listed in the chart [phone] and it states that the phone number is no longer in service. Then I tried to call the patient's emergency contact Shmuel Corley at both her numbers listed in the chart including [phone] and [phone] and left messages on both numbers.   We will consult case management to help track family  Palliative care consult   Discussed with patient     Case discussed with:  [x]Patient  []Family  [x]Nursing  []Case Management  DVT Prophylaxis:  []Lovenox  [x]Hep SQ  []SCDs  []Coumadin   []On Heparin gtt    Objective:   VS:   Visit Vitals  /76 (BP 1 Location: Right upper arm, BP Patient Position: At rest)   Pulse 73   Temp 97.2 °F (36.2 °C)   Resp 18   Ht 6' 1\" (1.854 m)   Wt 52.2 kg (115 lb)   SpO2 97%   BMI 15.17 kg/m²      Tmax/24hrs: Temp (24hrs), Av.7 °F (36.5 °C), Min:97.2 °F (36.2 °C), Max:98.3 °F (36.8 °C)    Input/Output:     Intake/Output Summary (Last 24 hours) at 2021 1559  Last data filed at 2021 192  Gross per 24 hour   Intake 2000 ml   Output    Net 2000 ml       General:  Awake,  follows commands, responds appropriately  Cardiovascular:  S1S2+, RRR  Pulmonary:  CTA b/l  GI:  Soft, BS+, NT, ND  Extremities:  trace edema  Patient is hard of hearing    Labs:    Recent Results (from the past 24 hour(s)) EKG, 12 LEAD, INITIAL    Collection Time: 08/13/21  4:17 PM   Result Value Ref Range    Ventricular Rate 141 BPM    Atrial Rate 141 BPM    P-R Interval 124 ms    QRS Duration 100 ms    Q-T Interval 288 ms    QTC Calculation (Bezet) 441 ms    Calculated P Axis 86 degrees    Calculated R Axis -55 degrees    Calculated T Axis 76 degrees    Diagnosis         Sinus tachycardia  Pulmonary disease pattern  Incomplete right bundle branch block  Left anterior fascicular block  Abnormal ECG  When compared with ECG of 25-JAN-2018 11:04,  Left anterior fascicular block is now present  Incomplete right bundle branch block has replaced Right bundle branch block  Criteria for Septal infarct are no longer present  Confirmed by Bridgette Cruz ([phone]) on 8/14/2021 11:59:45 AM     CULTURE, BLOOD    Collection Time: 08/13/21  4:20 PM    Specimen: Blood   Result Value Ref Range    Special Requests: NO SPECIAL REQUESTS      Culture result: NO GROWTH AFTER 13 HOURS     METABOLIC PANEL, COMPREHENSIVE    Collection Time: 08/13/21  4:20 PM   Result Value Ref Range    Sodium 139 136 - 145 mmol/L    Potassium 4.7 3.5 - 5.5 mmol/L    Chloride 112 (H) 100 - 111 mmol/L    CO2 19 (L) 21 - 32 mmol/L    Anion gap 8 3.0 - 18 mmol/L    Glucose 114 (H) 74 - 99 mg/dL    BUN 52 (H) 7.0 - 18 MG/DL    Creatinine 2.73 (H) 0.6 - 1.3 MG/DL    BUN/Creatinine ratio 19 12 - 20      GFR est AA 26 (L) >60 ml/min/1.73m2    GFR est non-AA 22 (L) >60 ml/min/1.73m2    Calcium 9.2 8.5 - 10.1 MG/DL    Bilirubin, total 1.0 0.2 - 1.0 MG/DL    ALT (SGPT) 23 16 - 61 U/L    AST (SGOT) 41 (H) 10 - 38 U/L    Alk.  phosphatase 164 (H) 45 - 117 U/L    Protein, total 7.9 6.4 - 8.2 g/dL    Albumin 3.4 3.4 - 5.0 g/dL    Globulin 4.5 (H) 2.0 - 4.0 g/dL    A-G Ratio 0.8 0.8 - 1.7     CBC WITH AUTOMATED DIFF    Collection Time: 08/13/21  4:20 PM   Result Value Ref Range    WBC 5.6 4.6 - 13.2 K/uL    RBC 4.79 4.35 - 5.65 M/uL    HGB 13.1 13.0 - 16.0 g/dL    HCT 41.5 36.0 - 48.0 %    MCV 86.6 74.0 - 97.0 FL    MCH 27.3 24.0 - 34.0 PG    MCHC 31.6 31.0 - 37.0 g/dL    RDW 13.6 11.6 - 14.5 %    PLATELET 329 432 - 280 K/uL    MPV 12.5 (H) 9.2 - 11.8 FL    NEUTROPHILS 47 40 - 73 %    LYMPHOCYTES 35 21 - 52 %    MONOCYTES 8 3 - 10 %    EOSINOPHILS 8 (H) 0 - 5 %    BASOPHILS 1 0 - 2 %    ABS. NEUTROPHILS 2.6 1.8 - 8.0 K/UL    ABS. LYMPHOCYTES 2.0 0.9 - 3.6 K/UL    ABS. MONOCYTES 0.5 0.05 - 1.2 K/UL    ABS. EOSINOPHILS 0.5 (H) 0.0 - 0.4 K/UL    ABS.  BASOPHILS 0.1 0.0 - 0.1 K/UL    DF AUTOMATED     CARDIAC PANEL,(CK, CKMB & TROPONIN)    Collection Time: 08/13/21  4:20 PM   Result Value Ref Range    CK - MB <1.0 <3.6 ng/ml    CK-MB Index  0.0 - 4.0 %     CALCULATION NOT PERFORMED WHEN RESULT IS BELOW LINEAR LIMIT     39 - 308 U/L    Troponin-I, QT 0.04 0.0 - 0.045 NG/ML   MAGNESIUM    Collection Time: 08/13/21  4:20 PM   Result Value Ref Range    Magnesium 2.7 (H) 1.6 - 2.6 mg/dL   PHOSPHORUS    Collection Time: 08/13/21  4:20 PM   Result Value Ref Range    Phosphorus 4.0 2.5 - 4.9 MG/DL   POC LACTIC ACID    Collection Time: 08/13/21  4:23 PM   Result Value Ref Range    Lactic Acid (POC) 3.37 (HH) 0.40 - 2.00 mmol/L   CULTURE, BLOOD    Collection Time: 08/13/21  4:30 PM    Specimen: Blood   Result Value Ref Range    Special Requests: NO SPECIAL REQUESTS      Culture result: NO GROWTH AFTER 13 HOURS     POC LACTIC ACID    Collection Time: 08/13/21  8:28 PM   Result Value Ref Range    Lactic Acid (POC) 2.18 (HH) 0.40 - 2.00 mmol/L   POC LACTIC ACID    Collection Time: 08/14/21  1:47 AM   Result Value Ref Range    Lactic Acid (POC) 1.45 0.40 - 8.90 mmol/L   METABOLIC PANEL, BASIC    Collection Time: 08/14/21  6:13 AM   Result Value Ref Range    Sodium 147 (H) 136 - 145 mmol/L    Potassium 4.7 3.5 - 5.5 mmol/L    Chloride 121 (H) 100 - 111 mmol/L    CO2 16 (L) 21 - 32 mmol/L    Anion gap 10 3.0 - 18 mmol/L    Glucose 65 (L) 74 - 99 mg/dL    BUN 44 (H) 7.0 - 18 MG/DL    Creatinine 2.03 (H) 0.6 - 1.3 MG/DL BUN/Creatinine ratio 22 (H) 12 - 20      GFR est AA 37 (L) >60 ml/min/1.73m2    GFR est non-AA 31 (L) >60 ml/min/1.73m2    Calcium 8.0 (L) 8.5 - 10.1 MG/DL   CBC W/O DIFF    Collection Time: 08/14/21  6:13 AM   Result Value Ref Range    WBC 6.0 4.6 - 13.2 K/uL    RBC 3.71 (L) 4.35 - 5.65 M/uL    HGB 10.0 (L) 13.0 - 16.0 g/dL    HCT 33.9 (L) 36.0 - 48.0 %    MCV 91.4 74.0 - 97.0 FL    MCH 27.0 24.0 - 34.0 PG    MCHC 29.5 (L) 31.0 - 37.0 g/dL    RDW 13.6 11.6 - 14.5 %    PLATELET 710 (L) 730 - 420 K/uL    MPV 11.8 9.2 - 11.8 FL     Additional Data Reviewed:      Signed By: Kae Roe MD     August 14, 2021

## 2021-08-14 NOTE — PROGRESS NOTES
Problem: Pressure Injury - Risk of  Goal: *Prevention of pressure injury  Description: Document Mynor Scale and appropriate interventions in the flowsheet. Outcome: Progressing Towards Goal  Note: Pressure Injury Interventions:       Moisture Interventions: Check for incontinence Q2 hours and as needed    Activity Interventions: Pressure redistribution bed/mattress(bed type), Increase time out of bed    Mobility Interventions: PT/OT evaluation, Pressure redistribution bed/mattress (bed type), HOB 30 degrees or less    Nutrition Interventions: Document food/fluid/supplement intake                     Problem: Falls - Risk of  Goal: *Absence of Falls  Description: Document Wagner Fall Risk and appropriate interventions in the flowsheet.   Outcome: Progressing Towards Goal  Note: Fall Risk Interventions:  Mobility Interventions: Bed/chair exit alarm, Patient to call before getting OOB    Mentation Interventions: Adequate sleep, hydration, pain control, Bed/chair exit alarm, More frequent rounding, Reorient patient, Room close to nurse's station    Medication Interventions: Bed/chair exit alarm, Patient to call before getting OOB, Teach patient to arise slowly    Elimination Interventions: Bed/chair exit alarm, Call light in reach, Stay With Me (per policy)              Problem: Patient Education: Go to Patient Education Activity  Goal: Patient/Family Education  Outcome: Progressing Towards Goal

## 2021-08-14 NOTE — CONSULTS
Comprehensive Nutrition Assessment    Type and Reason for Visit: Initial, Consult, Positive nutrition screen    Nutrition Recommendations/Plan:   - Add supplement: Ensure Enlive TID  - Encourage/ monitor po intake of meals and supplements  - Diet education provided to patient's family  - IVF per MD    Nutrition Assessment:  Pt lethargic at time of visit. Per son, has poor appetite/ po intake PTA; unplanned wt loss. Food preferences discussed. Underweight. Agreeable to nutrition supplement; drinks ensure at home. Poor po intake today; only consuming a few bites from his meals. Tolerating diet; SLP following. Son asking why pt is not on regular consistency diet; explained pt with risk for aspiration. Provided him and his wife information on mince & moist diet consistency; discussed the diet and restrictions with them. They verbalized understanding. NFPE completed    Malnutrition Assessment:  Malnutrition Status:  Severe malnutrition    Context:  Chronic illness     Findings of the 6 clinical characteristics of malnutrition:   Energy Intake:  Mild decrease in energy intake (specify) (mostly consuming Ensure drinks)  Weight Loss:  7 - Greater than 5% over 1 month     Body Fat Loss:  7 - Severe body fat loss, Orbital, Fat overlying ribs, Buccal region   Muscle Mass Loss:  7 - Severe muscle mass loss, Calf (gastrocnemius), Clavicles (pectoralis &deltoids), Hand (interosseous), Temples (temporalis)  Fluid Accumulation:  Unable to assess,     Strength:  Not performed        Nutrition History and Allergies: Past medical hx: CKD stage 4. Poor appetite / po intake PTA per son report. Wt loss of 21 lb, 15.4% x 1 month PTA per chart hx. Was in the 140- 150 lb range in 1550-7384 per chart hx. No known food allergies     Estimated Daily Nutrient Needs:  Energy (kcal): 9776-9029; Weight Used for Energy Requirements: Ideal (84 kg)  Protein (g): 52-63;  Weight Used for Protein Requirements: Admission (x1-1.2)  Fluid (ml/day): 7208-6373; Method Used for Fluid Requirements: 1 ml/kcal      Nutrition Related Findings:  BM 8/14. no edema.       IVF D5 1/2NS at 60 mL/hr (72 gm dextrose, 245 kcal per day)      Wounds:    None       Current Nutrition Therapies:  ADULT DIET Dysphagia - Minced & Moist    Anthropometric Measures:  · Height:  6' 1\" (185.4 cm)  · Current Body Wt:  52.2 kg (115 lb 1.3 oz)   · Admission Body Wt:  115 lb 1.3 oz    · Usual Body Wt:   (140-150 lb several years ago)     · Ideal Body Wt:  184 lbs:  62.5 %   · Adjusted Body Weight:   ; Weight Adjustment for: No adjustment   · BMI Category:  Underweight (BMI less than 22) age over 72       Nutrition Diagnosis:   · Severe malnutrition, In context of chronic illness related to inadequate protein-energy intake as evidenced by weight loss greater than or equal to 5% in 1 month, severe loss of subcutaneous fat, severe muscle loss    · Food & nutrition-related knowledge deficit related to  (mince and moist diet consistency) as evidenced by  (patient's family (son and his wife) having limited knowledge)      Nutrition Interventions:   Food and/or Nutrient Delivery: Continue current diet, Start oral nutrition supplement  Nutrition Education and Counseling: Education completed  Coordination of Nutrition Care: Continue to monitor while inpatient, Swallow evaluation, Speech therapy    Goals:  PO nutrition intake will meet >75% of patient's estimated nutrition needs within the next 7 days       Nutrition Monitoring and Evaluation:   Behavioral-Environmental Outcomes: None identified  Food/Nutrient Intake Outcomes: Diet advancement/tolerance, Food and nutrient intake, Supplement intake, Vitamin/mineral intake  Physical Signs/Symptoms Outcomes: Biochemical data, Chewing or swallowing, Meal time behavior, Nutrition focused physical findings    Discharge Planning:    Continue oral nutrition supplement, Continue current diet     Electronically signed by Kristopher Hodgson RD on 8/14/2021 at 5:34 PM    Contact: [phone]

## 2021-08-14 NOTE — PROGRESS NOTES
conducted an initial consultation and Spiritual Assessment for Yanely Hathaway, who is a 80 y.o.,male. Patients Primary Language is: [place]. According to the patients EMR Restoration Affiliation is: [facility].     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 Explored issues of lakeshia, belief, spirituality and Cheondoism/ritual needs while hospitalized. Listened empathically. Offered prayer and assurance of continued prayers on patient's behalf. Chart reviewed. The following outcomes where achieved:  Patient shared limited information about both their medical narrative and spiritual journey/beliefs. Patient processed feeling about current hospitalization. Patient expressed gratitude for 's visit. Assessment:  Patient does not have any Cheondoism/cultural needs that will affect patients preferences in health care. There are no spiritual or Cheondoism issues which require intervention at this time. Plan:  Chaplains will continue to follow and will provide pastoral care on an as needed/requested basis.  recommends bedside caregivers page  on duty if patient shows signs of acute spiritual or emotional distress.       [address] [facility]   [phone]

## 2021-08-14 NOTE — PROGRESS NOTES
Problem: Mobility Impaired (Adult and Pediatric)  Goal: *Acute Goals and Plan of Care (Insert Text)  Description: Physical Therapy Goals  Initiated 8/14/2021 and to be accomplished within 7 days. 1.  Patient will follow 90% of commands to maximize safety and active participation in mobility training. 2.  Patient will move from supine to sit and sit to supine in bed with supervision/set-up. 3.  Patient will maintain seated at edge of bed for 8 min with supervision/set-up to prepare for out of bed activity. 4.  Patient will perform sit to stand with supervision/set-up. 5.  Patient will transfer from bed to chair and chair to bed with supervision/set-up using the least restrictive device. 6.  Patient will ambulate with supervision/set-up for 25 feet with the least restrictive device. PLOF: Poor historian. Reports living alone in one story home and amb with ww. Outcome: Progressing Towards Goal     PHYSICAL THERAPY EVALUATION    Patient: Elizabeth Shepard (81 y.o. male)  Date: 8/14/2021  Primary Diagnosis: Failure to thrive in adult [R62.7]  Altered mental status [R41.82]  Precautions: Fall, Skin, Aspiration  ASSESSMENT :  Hard of hearing. Poor historian; reports living alone and ambulating with ww. Decreased command following and attention to task. Able to wiggle toes. Demonstrates 25% of knee flexion to command for AROM of BLE. Attempted supine to sit on EOB. Patient demonstrates long sit with min A; unable to follow commands to progress to short sit at EOB.  bpm in long sit. Returned to supine and deferred further mobility. HR decreased to 114 bpm post mobility. Call bell in reach. Patient will benefit from skilled intervention to address the above impairments.   Patient's rehabilitation potential is considered to be Fair  Factors which may influence rehabilitation potential include:   []         None noted  []         Mental ability/status  [x]         Medical condition  [] Home/family situation and support systems  []         Safety awareness  []         Pain tolerance/management  []         Other:      PLAN :  Recommendations and Planned Interventions:   [x]           Bed Mobility Training             [x]    Neuromuscular Re-Education  [x]           Transfer Training                   []    Orthotic/Prosthetic Training  [x]           Gait Training                          []    Modalities  [x]           Therapeutic Exercises           []    Edema Management/Control  [x]           Therapeutic Activities            [x]    Family Training/Education  [x]           Patient Education  []           Other (comment):    Frequency/Duration: Patient will be followed by physical therapy 3-5 times a week to address goals. Discharge Recommendations: Rehab  Further Equipment Recommendations for Discharge: TBD with OOB     SUBJECTIVE:   Patient stated What?    OBJECTIVE DATA SUMMARY:     Past Medical History:   Diagnosis Date    Arthritis     Back pain, chronic      Past Surgical History:   Procedure Laterality Date    HX HEENT      relieved pressure R eye 1/2014     Barriers to Learning/Limitations: yes;  sensory deficits-vision/hearing/speech and altered mental status (i.e.Sedation, Confusion)  Compensate with: Visual Cues, Verbal Cues, Tactile Cues and Kinesthetic Cues    Home Situation:  Home Situation  Home Environment: Private residence  One/Two Story Residence: One story  Support Systems: Spouse/Significant Other/Partner    Critical Behavior:  Neurologic State: Confused  Orientation Level: Oriented to person  Cognition: Decreased command following;Decreased attention/concentration     Psychosocial  Purposeful Interaction: Yes  Caring Interventions: Reassure  Reassure: Informing; Acceptance;Quiet presence    Strength:    BLE grossly 3/5; unable to formally assess d/t command following   Range Of Motion:  BLE AROM limited by command following  Functional Mobility:  Bed Mobility:  Supine to Sit: Minimum assistance (long sit)  Balance:   Sitting: Impaired  Sitting - Static: Fair (occasional)  Pain:  Pain level pre-treatment: 0/10   Pain level post-treatment: 0/10     Activity Tolerance:   Poor     After treatment:   []         Patient left in no apparent distress sitting up in chair  [x]         Patient left in no apparent distress in bed  [x]         Call bell left within reach  [x]         Nursing notified  []         Caregiver present  [x]         Bed alarm activated  []         SCDs applied    COMMUNICATION/EDUCATION:   [x]         Role of physical therapy and plan of care in the acute care setting. [x]         Fall prevention education was provided and the patient/caregiver indicated understanding. [x]         Patient/family have participated as able in goal setting and plan of care. []         Patient/family agree to work toward stated goals and plan of care. []         Patient understands intent and goals of therapy, but is neutral about his/her participation. []         Patient is unable to participate in goal setting/plan of care: ongoing with therapy staff.     Thank you for this referral.  Dawood Garcia, PT   Time Calculation: 9 mins    Eval Complexity: History: MEDIUM  Complexity : 1-2 comorbidities / personal factors will impact the outcome/ POC Exam:MEDIUM Complexity : 3 Standardized tests and measures addressing body structure, function, activity limitation and / or participation in recreation  Presentation: MEDIUM Complexity : Evolving with changing characteristics  Clinical Decision Making:Medium Complexity   Clinical judgement; ROM, MMT, functional mobility Overall Complexity:MEDIUM

## 2021-08-15 LAB
ALBUMIN SERPL-MCNC: 2.7 G/DL (ref 3.4–5)
ANION GAP SERPL CALC-SCNC: 6 MMOL/L (ref 3–18)
ANION GAP SERPL CALC-SCNC: 7 MMOL/L (ref 3–18)
BASOPHILS # BLD: 0 K/UL (ref 0–0.1)
BASOPHILS NFR BLD: 0 % (ref 0–2)
BUN SERPL-MCNC: 25 MG/DL (ref 7–18)
BUN SERPL-MCNC: 30 MG/DL (ref 7–18)
BUN/CREAT SERPL: 17 (ref 12–20)
BUN/CREAT SERPL: 21 (ref 12–20)
CALCIUM SERPL-MCNC: 8.2 MG/DL (ref 8.5–10.1)
CALCIUM SERPL-MCNC: 8.7 MG/DL (ref 8.5–10.1)
CHLORIDE SERPL-SCNC: 117 MMOL/L (ref 100–111)
CHLORIDE SERPL-SCNC: 119 MMOL/L (ref 100–111)
CO2 SERPL-SCNC: 17 MMOL/L (ref 21–32)
CO2 SERPL-SCNC: 18 MMOL/L (ref 21–32)
CREAT SERPL-MCNC: 1.44 MG/DL (ref 0.6–1.3)
CREAT SERPL-MCNC: 1.46 MG/DL (ref 0.6–1.3)
DIFFERENTIAL METHOD BLD: ABNORMAL
EOSINOPHIL # BLD: 0.3 K/UL (ref 0–0.4)
EOSINOPHIL NFR BLD: 7 % (ref 0–5)
ERYTHROCYTE [DISTWIDTH] IN BLOOD BY AUTOMATED COUNT: 13.7 % (ref 11.6–14.5)
GLUCOSE SERPL-MCNC: 82 MG/DL (ref 74–99)
GLUCOSE SERPL-MCNC: 96 MG/DL (ref 74–99)
HCT VFR BLD AUTO: 34.3 % (ref 36–48)
HGB BLD-MCNC: 10.6 G/DL (ref 13–16)
INR PPP: 1.2 (ref 0.8–1.2)
LYMPHOCYTES # BLD: 1.2 K/UL (ref 0.9–3.6)
LYMPHOCYTES NFR BLD: 26 % (ref 21–52)
MAGNESIUM SERPL-MCNC: 1.9 MG/DL (ref 1.6–2.6)
MCH RBC QN AUTO: 27.8 PG (ref 24–34)
MCHC RBC AUTO-ENTMCNC: 30.9 G/DL (ref 31–37)
MCV RBC AUTO: 90 FL (ref 74–97)
MONOCYTES # BLD: 0.4 K/UL (ref 0.05–1.2)
MONOCYTES NFR BLD: 9 % (ref 3–10)
NEUTS SEG # BLD: 2.6 K/UL (ref 1.8–8)
NEUTS SEG NFR BLD: 57 % (ref 40–73)
PHOSPHATE SERPL-MCNC: 2.4 MG/DL (ref 2.5–4.9)
PLATELET # BLD AUTO: 100 K/UL (ref 135–420)
PMV BLD AUTO: 12.7 FL (ref 9.2–11.8)
POTASSIUM SERPL-SCNC: 3.6 MMOL/L (ref 3.5–5.5)
POTASSIUM SERPL-SCNC: 4.2 MMOL/L (ref 3.5–5.5)
PROTHROMBIN TIME: 15.1 SEC (ref 11.5–15.2)
RBC # BLD AUTO: 3.81 M/UL (ref 4.35–5.65)
SODIUM SERPL-SCNC: 141 MMOL/L (ref 136–145)
SODIUM SERPL-SCNC: 143 MMOL/L (ref 136–145)
WBC # BLD AUTO: 4.6 K/UL (ref 4.6–13.2)

## 2021-08-15 PROCEDURE — 85610 PROTHROMBIN TIME: CPT

## 2021-08-15 PROCEDURE — 74011250636 HC RX REV CODE- 250/636: Performed by: INTERNAL MEDICINE

## 2021-08-15 PROCEDURE — 74011000250 HC RX REV CODE- 250: Performed by: INTERNAL MEDICINE

## 2021-08-15 PROCEDURE — 36415 COLL VENOUS BLD VENIPUNCTURE: CPT

## 2021-08-15 PROCEDURE — 83735 ASSAY OF MAGNESIUM: CPT

## 2021-08-15 PROCEDURE — 82784 ASSAY IGA/IGD/IGG/IGM EACH: CPT

## 2021-08-15 PROCEDURE — 74011250636 HC RX REV CODE- 250/636: Performed by: STUDENT IN AN ORGANIZED HEALTH CARE EDUCATION/TRAINING PROGRAM

## 2021-08-15 PROCEDURE — 2709999900 HC NON-CHARGEABLE SUPPLY

## 2021-08-15 PROCEDURE — 80069 RENAL FUNCTION PANEL: CPT

## 2021-08-15 PROCEDURE — 80048 BASIC METABOLIC PNL TOTAL CA: CPT

## 2021-08-15 PROCEDURE — 99232 SBSQ HOSP IP/OBS MODERATE 35: CPT | Performed by: EMERGENCY MEDICINE

## 2021-08-15 PROCEDURE — 97166 OT EVAL MOD COMPLEX 45 MIN: CPT

## 2021-08-15 PROCEDURE — 65660000000 HC RM CCU STEPDOWN

## 2021-08-15 PROCEDURE — 83883 ASSAY NEPHELOMETRY NOT SPEC: CPT

## 2021-08-15 PROCEDURE — 85025 COMPLETE CBC W/AUTO DIFF WBC: CPT

## 2021-08-15 PROCEDURE — 97535 SELF CARE MNGMENT TRAINING: CPT

## 2021-08-15 RX ADMIN — Medication 10 ML: at 20:28

## 2021-08-15 RX ADMIN — Medication 10 ML: at 05:45

## 2021-08-15 RX ADMIN — SODIUM BICARBONATE: 84 INJECTION, SOLUTION INTRAVENOUS at 07:54

## 2021-08-15 RX ADMIN — SODIUM BICARBONATE: 84 INJECTION, SOLUTION INTRAVENOUS at 20:26

## 2021-08-15 RX ADMIN — Medication 10 ML: at 13:07

## 2021-08-15 RX ADMIN — HEPARIN SODIUM 5000 UNITS: 5000 INJECTION INTRAVENOUS; SUBCUTANEOUS at 13:07

## 2021-08-15 NOTE — PROGRESS NOTES
Problem: Pressure Injury - Risk of  Goal: *Prevention of pressure injury  Description: Document Mynor Scale and appropriate interventions in the flowsheet. Outcome: Progressing Towards Goal  Note: Pressure Injury Interventions:  Sensory Interventions: Float heels, Keep linens dry and wrinkle-free, Maintain/enhance activity level, Minimize linen layers, Pad between skin to skin, Pressure redistribution bed/mattress (bed type), Turn and reposition approx. every two hours (pillows and wedges if needed), Assess changes in LOC    Moisture Interventions: Absorbent underpads, Apply protective barrier, creams and emollients, Internal/External urinary devices, Maintain skin hydration (lotion/cream), Minimize layers, Moisture barrier, Check for incontinence Q2 hours and as needed, Assess need for specialty bed    Activity Interventions: Assess need for specialty bed, Pressure redistribution bed/mattress(bed type), PT/OT evaluation    Mobility Interventions: Assess need for specialty bed, Float heels, HOB 30 degrees or less, Pressure redistribution bed/mattress (bed type), PT/OT evaluation, Turn and reposition approx. every two hours(pillow and wedges)    Nutrition Interventions: Document food/fluid/supplement intake, Discuss nutritional consult with provider                     Problem: Patient Education: Go to Patient Education Activity  Goal: Patient/Family Education  Outcome: Progressing Towards Goal     Problem: Falls - Risk of  Goal: *Absence of Falls  Description: Document Chip Armor Fall Risk and appropriate interventions in the flowsheet.   Outcome: Progressing Towards Goal  Note: Fall Risk Interventions:  Mobility Interventions: Bed/chair exit alarm, Communicate number of staff needed for ambulation/transfer    Mentation Interventions: Door open when patient unattended, Bed/chair exit alarm, Adequate sleep, hydration, pain control, Evaluate medications/consider consulting pharmacy, More frequent rounding, Reorient patient, Room close to nurse's station, Toileting rounds, Update white board    Medication Interventions: Bed/chair exit alarm, Evaluate medications/consider consulting pharmacy, Teach patient to arise slowly    Elimination Interventions: Bed/chair exit alarm, Call light in reach, Toileting schedule/hourly rounds

## 2021-08-15 NOTE — PROGRESS NOTES
Consultation noted , discussed with Dr Tawanna Jimenez , chart reviewed, orders placed, A/P as below:    HPI :  Patient is a 70-year-old -American male with history of Paget's disease, arthritis, chronic kidney disease stage IV with recent baseline creat of 1.5-2.0 presented with altered metal status . Patient was noted to have poor appetite, was more sleepy, decreased in urination. Noted that patient patient had an episode of UTI in July but had refused to take antibiotics or complete his course.     Assessment and plan    #1 JULIET on CKD 4, likely etiology prerenal azotemia in the setting of poor intake and dehydration, improving  #2 chronic kidney disease stage IV  #3 thrombocytopenia/mild leukopenia  #4 possible UTI but urine culture negative so far, defer to primary, defer to primary  #5 non-anion gap metabolic acidosis, likely from JULIET  #6 lactic acidosis resolved  #7 abnormal liver enzymes  #8 failure to thrive  #9 mild hypernatremia  #10 bilateral renal cysts on CT    Plan:  #1 switch to hypotonic bicarb drip  #2 monitor electrolytes and trend renal parameters  #3 renal ultrasound to evaluate for obstructive uropathy  #4 check urine protein creatinine ratio  #5 check paraprotein work-up  #6 encourage p.o. intake  #7 urine culture     Patient will be staffed in the morning by Dr. Charles Thrasher    Please call with questions    Selam Robertson MD FASN  Cell [phone]  Pager: [phone]

## 2021-08-15 NOTE — PROGRESS NOTES
[facility] Hospitalist Group  Progress Note    Patient: Chandrika Grant Age: 80 y.o. : 3/14/1927 MR#: [id number] SSN: [id number]  Date/Time: 8/15/2021    Subjective:     Patient is laying in bed in no apparent distress, somnolent but awakens to verbal commands. Denies any distress    Assessment/Plan:     Acute metabolic encephalopathy in the setting of dehydration   Acute kidney injury on top of chronic kidney disease stage III  Advanced age  Failure to thrive  Lactic acidosis at admissionimproved  Dysphagia    Plan  IV fluids per nephrology  Monitor renal function  Diet as per speech  Nutrition evaluation  PT and OT  I called patient's son at phone #[phone] and left a message. I then called the other number listed for patient's son at phone #[phone] and updated him regarding patient's care. I discussed with him regarding the therapy recommendations for SNF/rehab.    consulted  Palliative care consult   Discussed with patient     Case discussed with:  [x]Patient  []Family  [x]Nursing  []Case Management  DVT Prophylaxis:  []Lovenox  [x]Hep SQ  []SCDs  []Coumadin   []On Heparin gtt    Objective:   VS:   Visit Vitals  /84 (BP 1 Location: Right upper arm, BP Patient Position: At rest)   Pulse (!) 107   Temp 97.2 °F (36.2 °C)   Resp 18   Ht 6' 1\" (1.854 m)   Wt 55.5 kg (122 lb 5.7 oz)   SpO2 100%   BMI 16.14 kg/m²      Tmax/24hrs: Temp (24hrs), Av.5 °F (36.4 °C), Min:97.2 °F (36.2 °C), Max:97.9 °F (36.6 °C)    Input/Output:     Intake/Output Summary (Last 24 hours) at 8/15/2021 1438  Last data filed at 8/15/2021 8459  Gross per 24 hour   Intake 847 ml   Output    Net 847 ml       General: Sleepy, awakens to verbal commands  Cardiovascular:  S1S2+, RRR  Pulmonary:  CTA b/l  GI:  Soft, BS+, NT, ND  Extremities:  trace edema  Hard of hearing    Labs:    Recent Results (from the past 24 hour(s))   CBC WITH AUTOMATED DIFF    Collection Time: 08/15/21  1:59 AM   Result Value Ref Range    WBC 4.6 4.6 - 13.2 K/uL    RBC 3.81 (L) 4.35 - 5.65 M/uL    HGB 10.6 (L) 13.0 - 16.0 g/dL    HCT 34.3 (L) 36.0 - 48.0 %    MCV 90.0 74.0 - 97.0 FL    MCH 27.8 24.0 - 34.0 PG    MCHC 30.9 (L) 31.0 - 37.0 g/dL    RDW 13.7 11.6 - 14.5 %    PLATELET 146 (L) 045 - 420 K/uL    MPV 12.7 (H) 9.2 - 11.8 FL    NEUTROPHILS 57 40 - 73 %    LYMPHOCYTES 26 21 - 52 %    MONOCYTES 9 3 - 10 %    EOSINOPHILS 7 (H) 0 - 5 %    BASOPHILS 0 0 - 2 %    ABS. NEUTROPHILS 2.6 1.8 - 8.0 K/UL    ABS. LYMPHOCYTES 1.2 0.9 - 3.6 K/UL    ABS. MONOCYTES 0.4 0.05 - 1.2 K/UL    ABS. EOSINOPHILS 0.3 0.0 - 0.4 K/UL    ABS.  BASOPHILS 0.0 0.0 - 0.1 K/UL    DF AUTOMATED     METABOLIC PANEL, BASIC    Collection Time: 08/15/21  1:59 AM   Result Value Ref Range    Sodium 143 136 - 145 mmol/L    Potassium 4.2 3.5 - 5.5 mmol/L    Chloride 119 (H) 100 - 111 mmol/L    CO2 17 (L) 21 - 32 mmol/L    Anion gap 7 3.0 - 18 mmol/L    Glucose 82 74 - 99 mg/dL    BUN 30 (H) 7.0 - 18 MG/DL    Creatinine 1.46 (H) 0.6 - 1.3 MG/DL    BUN/Creatinine ratio 21 (H) 12 - 20      GFR est AA 54 (L) >60 ml/min/1.73m2    GFR est non-AA 45 (L) >60 ml/min/1.73m2    Calcium 8.2 (L) 8.5 - 10.1 MG/DL   MAGNESIUM    Collection Time: 08/15/21  1:59 AM   Result Value Ref Range    Magnesium 1.9 1.6 - 2.6 mg/dL   PROTHROMBIN TIME + INR    Collection Time: 08/15/21  1:59 AM   Result Value Ref Range    Prothrombin time 15.1 11.5 - 15.2 sec    INR 1.2 0.8 - 1.2     RENAL FUNCTION PANEL    Collection Time: 08/15/21  9:32 AM   Result Value Ref Range    Sodium 141 136 - 145 mmol/L    Potassium 3.6 3.5 - 5.5 mmol/L    Chloride 117 (H) 100 - 111 mmol/L    CO2 18 (L) 21 - 32 mmol/L    Anion gap 6 3.0 - 18 mmol/L    Glucose 96 74 - 99 mg/dL    BUN 25 (H) 7.0 - 18 MG/DL    Creatinine 1.44 (H) 0.6 - 1.3 MG/DL    BUN/Creatinine ratio 17 12 - 20      GFR est AA 55 (L) >60 ml/min/1.73m2    GFR est non-AA 46 (L) >60 ml/min/1.73m2    Calcium 8.7 8.5 - 10.1 MG/DL    Phosphorus 2.4 (L) 2.5 - 4.9 MG/DL    Albumin 2.7 (L) 3.4 - 5.0 g/dL     Additional Data Reviewed:      Signed By: Sherie Meade MD     August 15, 2021

## 2021-08-15 NOTE — PROGRESS NOTES
8/14/2021  19:45- Report received from previous nurse, Alanna Shrestha, [address] [place] [address].  20:00- Assessment completed- see flow sheet. Call light in reach-continue to monitor, Bed alarm on for safety. 8/15/2021 05:25- Patient reported that he had to go to the bathroom- noted that both his hands had stool on them and patient incontinent of large, liquid, brown, stool- complete bed bath and incontinence care given and changed gown and all linen. Continue to monitor,  Incontinent of urine-condom catheter applied . 06:15- Noted condom catheter is off.   07:35- Bedside report including that urine spec is needed (see orders) given to oncoming nurse, Alanna Shrestha RN.

## 2021-08-15 NOTE — PROGRESS NOTES
Problem: Self Care Deficits Care Plan (Adult)  Goal: *Acute Goals and Plan of Care (Insert Text)  Description: Occupational Therapy Goals  Initiated 8/15/2021 within 7 day(s). 1.  Patient will perform grooming with supervision/set-up seated EOB for 10 min with Good balance. 2.  Patient will perform upper body dressing with supervision/set-up. 3.  Patient will perform bed mobility to EOB for ADls with supervision/set-up. 4.  Patient will perform toilet transfers with minimal assistance/contact guard assist.  5.  Patient will perform all aspects of toileting with minimal assistance/contact guard assist.  6.  Patient will participate in upper extremity therapeutic exercise/activities with supervision/set-up for 8 minutes. 7.  Patient will utilize energy conservation techniques during functional activities with verbal cues. Prior Level of Function: Pt is not a good historian, reports he lives alone, however, then says he receives assistance for bathing and LB dressing, unable to specify how much assistance and who is providing it. Outcome: Progressing Towards Goal  OCCUPATIONAL THERAPY EVALUATION    Patient: Norman Banks (81 y.o. male)  Date: 8/15/2021  Primary Diagnosis: Failure to thrive in adult [R62.7]  Altered mental status [R41.82]        Precautions:   Fall, Skin    ASSESSMENT :  Pt cleared to participate in OT evaluation by RN. Upon entering room, pt received in bed, alert, pleasantly confused, and agreeable to OT eval/treatment. Based on the objective data described below, the patient presents with decreased endurance and functional activity tolerance, generalized weakness, confusion, decreased functional mobility, decreased sitting and standing balance, limiting his participation and independence with ADLs. Pt noted to be heavily soiled with BM, requied Max Afor elmer-care, was able to maneuver in bed with SBA-CGA during task.  Pt maneuver to EOB and required Max A to don socks in preparation for functional txfrs training. Pt maneuvered to std with Min A and performed simulated toilet txfr with HHA x2 with Min A. Pt's HR assessed in std, found to be 155 bpm, assisted pt back to sup, HR decreased to 104 bpm within 2 min. Patient will benefit from skilled intervention to address the above impairments. Patient's rehabilitation potential is considered to be Fair  Factors which may influence rehabilitation potential include:   []             None noted  [x]             Mental ability/status  [x]             Medical condition  [x]             Home/family situation and support systems  [x]             Safety awareness  [x]             Pain tolerance/management  []             Other:      PLAN :  Recommendations and Planned Interventions:   [x]               Self Care Training                  [x]      Therapeutic Activities  [x]               Functional Mobility Training   [x]      Cognitive Retraining  [x]               Therapeutic Exercises           [x]      Endurance Activities  [x]               Balance Training                    [x]      Neuromuscular Re-Education  []               Visual/Perceptual Training     [x]      Home Safety Training  [x]               Patient Education                   [x]      Family Training/Education  []               Other (comment):    Frequency/Duration: Patient will be followed by occupational therapy 1-2 times per day/3-5 days per week to address goals. Discharge Recommendations: [facility]  Further Equipment Recommendations for Discharge: bedside commode, hospital bed, and rolling walker     SUBJECTIVE:   Patient stated I am so cold, I can't believe it.     OBJECTIVE DATA SUMMARY:     Past Medical History:   Diagnosis Date    Arthritis     Back pain, chronic      Past Surgical History:   Procedure Laterality Date    HX HEENT      relieved pressure R eye 1/2014     Barriers to Learning/Limitations: yes;  altered mental status (i.e.Sedation, Confusion)  Compensate with: visual, verbal, tactile, kinesthetic cues/model    Home Situation:   Home Situation  Home Environment: Private residence  One/Two Story Residence: One story  Support Systems: Spouse/Significant Other/Partner  Current DME Used/Available at Home: Walker  []  Right hand dominant   []  Left hand dominant    Cognitive/Behavioral Status:  Neurologic State: Alert;Confused  Orientation Level: Oriented to person  Cognition: Decreased command following; Follows commands  Safety/Judgement: Awareness of environment    Skin: visible skin intact  Edema: none noted    Vision/Perceptual:       Pt is able to attend to TV screen  Coordination: BUE  Coordination: Generally decreased, functional  Fine Motor Skills-Upper: Left Intact; Right Intact    Gross Motor Skills-Upper: Left Intact; Right Intact    Balance:  Sitting: Impaired; With support  Sitting - Static: Good (unsupported)  Sitting - Dynamic: Fair (occasional)  Standing: Impaired; With support  Standing - Static: Fair  Standing - Dynamic : Poor    Strength: BUE  Strength: Generally decreased, functional   Tone & Sensation: BUE  Tone: Normal  Sensation: Intact   Range of Motion: BUE  AROM: Generally decreased, functional      Functional Mobility and Transfers for ADLs:  Bed Mobility:  Rolling: Stand-by assistance  Supine to Sit: Minimum assistance; Additional time  Sit to Supine: Contact guard assistance     Transfers:  Sit to Stand: Minimum assistance  Stand to Sit: Minimum assistance   Toilet Transfer : Minimum assistance (simulated)    ADL Assessment:   Feeding: Stand-by assistance  Oral Facial Hygiene/Grooming: Stand-by assistance  Bathing:  Moderate assistance  Upper Body Dressing: Minimum assistance  Lower Body Dressing: Maximum assistance  Toileting: Maximum assistance   ADL Intervention:     Cognitive Retraining  Safety/Judgement: Awareness of environment    Pain:  Pain level pre-treatment: 0/10   Pain level post-treatment: 0/10 Activity Tolerance:   Fair  Please refer to the flowsheet for vital signs taken during this treatment. After treatment:   [] Patient left in no apparent distress sitting up in chair  [x] Patient left in no apparent distress in bed  [x] Call bell left within reach  [x] Nursing notified  [] Caregiver present  [x] Bed alarm activated    COMMUNICATION/EDUCATION:   [x] Role of Occupational Therapy in the acute care setting  [x] Home safety education was provided and the patient/caregiver indicated understanding. [x] Patient/family have participated as able in goal setting and plan of care. [x] Patient/family agree to work toward stated goals and plan of care. [] Patient understands intent and goals of therapy, but is neutral about his/her participation. [] Patient is unable to participate in goal setting and plan of care. Thank you for this referral.  Kathe Soto, OTR/L  Time Calculation: 24 mins    Eval Complexity: History: MEDIUM Complexity : Expanded review of history including physical, cognitive and psychosocial  history ; Examination: MEDIUM Complexity : 3-5 performance deficits relating to physical, cognitive , or psychosocial skils that result in activity limitations and / or participation restrictions; Decision Making:MEDIUM Complexity : Patient may present with comorbidities that affect occupational performnce.  Miniml to moderate modification of tasks or assistance (eg, physical or verbal ) with assesment(s) is necessary to enable patient to complete evaluation

## 2021-08-15 NOTE — CONSULTS
Consult Note    Assessment:   · JULIET on CKD 3B. Etio- volume depletion due to poor oral intake. Was hypotensive on arrival but doubt progression to ischemic atn given improving renal function. · CKD 3 B due to most likely age related decline in the gfr. .  · Hypovolemic hypernatremia. Improving. · Normal ag met acidosis due to juliet. · Failure to thrive. · Altered ms. No overt infection. Recommendations:   · Continue hypotonic [place]. · Monitor renal function. · F/u on the result of renal us. · F/u on ua. · Avoid NSAID's, IV dye. · Avoid fleets enemas due to concern for acute phosphate nephropathy. · Please dose all medications for approximate creatinine clearance  45-30. Thank you. Consult requested by: Carla Silver MD    ADMIT DATE: 8/13/2021  CONSULT DATE: August 15, 2021                 Admission diagnosis: poor oral intake. Reason for Nephrology Consultation: juliet. HPI: Marcos Peña is a 80 y.o. male BLACK/ AMERICANwith h/o of paget's disease and uti diagnosed at the time of ed visit on 7/9 (reportedly didn't finish keflex) who was brought to ED by the family due to worsening fatigue, poor oral intake, worsening mental status and incontinence. H/o is based on the medical records. Patient was hypotensive and tachycardic on arrival. Patient was admitted, he is more stable this am with ivf. Baseline scr appears to be in the mid one's. On admission scr was 2.73, it is down to 1.46 today. Na was 147, 143 today.         Past Medical History:   Diagnosis Date    Arthritis     Back pain, chronic       Past Surgical History:   Procedure Laterality Date    HX HEENT      relieved pressure R eye 1/2014       Social History     Socioeconomic History    Marital status:      Spouse name: Not on file    Number of children: Not on file    Years of education: Not on file    Highest education level: Not on file   Occupational History    Not on file Tobacco Use    Smoking status: Never Smoker    Smokeless tobacco: Never Used   Substance and Sexual Activity    Alcohol use: No     Alcohol/week: 0.8 standard drinks     Types: 1 Standard drinks or equivalent per week    Drug use: No    Sexual activity: Not on file   Other Topics Concern    Not on file   Social History Narrative    Not on file     Social Determinants of Health     Financial Resource Strain:     Difficulty of Paying Living Expenses:    Food Insecurity:     Worried About Running Out of Food in the Last Year:     Ran Out of Food in the Last Year:    Transportation Needs:     Lack of Transportation (Medical):  Lack of Transportation (Non-Medical):    Physical Activity:     Days of Exercise per Week:     Minutes of Exercise per Session:    Stress:     Feeling of Stress :    Social Connections:     Frequency of Communication with Friends and Family:     Frequency of Social Gatherings with Friends and Family:     Attends [employer] Services:     Active Member of Clubs or Organizations:     Attends Club or Organization Meetings:     Marital Status:    Intimate Partner Violence:     Fear of Current or Ex-Partner:     Emotionally Abused:     Physically Abused:     Sexually Abused:        Family History   Problem Relation Age of Onset    Diabetes Father     No Known Problems Mother      No Known Allergies     Home Medications:     Medications Prior to Admission   Medication Sig    gabapentin (NEURONTIN) 300 mg capsule Take 300 mg by mouth three (3) times daily. Indications: neuropathic pain    bisacodyl (DULCOLAX) 5 mg EC tablet Take 1 Tab by mouth daily as needed for Constipation.  timolol (TIMOPTIC) 0.5 % ophthalmic solution Administer 1 Drop to both eyes two (2) times a day.        Current Inpatient Medications:     Current Facility-Administered Medications   Medication Dose Route Frequency    sodium bicarbonate (8.4%) 75 mEq in dextrose 5% 1,000 mL infusion   IntraVENous CONTINUOUS    sodium chloride (NS) flush 5-10 mL  5-10 mL IntraVENous PRN    sodium chloride (NS) flush 5-10 mL  5-10 mL IntraVENous PRN    sodium chloride (NS) flush 5-40 mL  5-40 mL IntraVENous Q8H    sodium chloride (NS) flush 5-40 mL  5-40 mL IntraVENous PRN    acetaminophen (TYLENOL) tablet 650 mg  650 mg Oral Q6H PRN    Or    acetaminophen (TYLENOL) suppository 650 mg  650 mg Rectal Q6H PRN    polyethylene glycol (MIRALAX) packet 17 g  17 g Oral DAILY PRN    ondansetron (ZOFRAN ODT) tablet 4 mg  4 mg Oral Q8H PRN    Or    ondansetron (ZOFRAN) injection 4 mg  4 mg IntraVENous Q6H PRN    heparin (porcine) injection 5,000 Units  5,000 Units SubCUTAneous Q8H       Review of Systems:   No sob, no cp. Hungry. Unable to obtain any additional information. Physical Assessment:     Vitals:    08/14/21 2050 08/15/21 0030 08/15/21 0505 08/15/21 0525   BP: (!) 144/79 131/81 139/67    Pulse: 76 76 76    Resp: 16 16 18    Temp: 97.9 °F (36.6 °C) 97.3 °F (36.3 °C) 97.5 °F (36.4 °C)    SpO2: 99% 95% 100%    Weight:    55.5 kg (122 lb 5.7 oz)   Height:         Last 3 Recorded Weights in this Encounter    08/13/21 1605 08/15/21 0525   Weight: 52.2 kg (115 lb) 55.5 kg (122 lb 5.7 oz)     Admission weight: Weight: 52.2 kg (115 lb) (08/13/21 1605)      Intake/Output Summary (Last 24 hours) at 8/15/2021 0841  Last data filed at 8/15/2021 0659  Gross per 24 hour   Intake 847 ml   Output    Net 847 ml       Patient is in no apparent distress. Cachectic. HEENT: Head is normocephalic and atraumatic. Pupils are round, equal, reactive to light. Sclerae are anicteric. Dry oral mucosa. Neck: no cervical lymphadenopathy or thyromegaly. Lungs: good air entry, clear to auscultation bilaterally. Trachea at the midline. Cardiovascular system: S1, S2, regular rate and rhythm. No murmurs, gallops or rubs. No jvd. Carotid upstroke 2 + bilaterally. Abdomen: soft, non tender, non distended. Positive bowel sounds.  No hepatosplenomegaly. No abdominal bruits. Extremities: no clubbing, cyanosis or edema. Strong dorsalis pedis pulses. Brisk capillary refill on the toes bilaterally. Integumentary: skin is grossly intact. Neurologic: Alert, oriented time three. Cooperative and appropriate. No gross motor or sensory deficits. Data Review:    Labs: Results:       Chemistry Recent Labs     08/15/21  0159 08/14/21  0621  1620   GLU 82 65* 114*    147* 139   K 4.2 4.7 4.7   * 121* 112*   CO2 17* 16* 19*   BUN 30* 44* 52*   CREA 1.46* 2.03* 2.73*   CA 8.2* 8.0* 9.2   AGAP 7 10 8   BUCR 21* 22* 19   AP  --   --  164*   TP  --   --  7.9   ALB  --   --  3.4   GLOB  --   --  4.5*   AGRAT  --   --  0.8   PHOS  --   --  4.0         CBC w/Diff Recent Labs     08/15/21  0159 08/14/21  0621  1620   WBC 4.6 6.0 5.6   RBC 3.81* 3.71* 4.79   HGB 10.6* 10.0* 13.1   HCT 34.3* 33.9* 41.5   * 104* 139   GRANS 57  --  47   LYMPH 26  --  35   EOS 7*  --  8*         Iron/Ferritin No results for input(s): IRON in the last 72 hours. No lab exists for component: TIBCCALC   PTH/VIT D No results for input(s): PTH in the last 72 hours.     No lab exists for component: VITD           Genevia Apgar, M.D  [facility]  Office [phone]  Pager [phone]    August 15, 2021

## 2021-08-15 NOTE — PROGRESS NOTES
Problem: Pressure Injury - Risk of  Goal: *Prevention of pressure injury  Description: Document Mynor Scale and appropriate interventions in the flowsheet. Outcome: Progressing Towards Goal  Note: Pressure Injury Interventions:  Sensory Interventions: Assess changes in LOC, Keep linens dry and wrinkle-free, Minimize linen layers, Pressure redistribution bed/mattress (bed type), Turn and reposition approx. every two hours (pillows and wedges if needed)    Moisture Interventions: Absorbent underpads    Activity Interventions: Increase time out of bed, Pressure redistribution bed/mattress(bed type), PT/OT evaluation    Mobility Interventions: Pressure redistribution bed/mattress (bed type), Turn and reposition approx. every two hours(pillow and wedges)    Nutrition Interventions: Document food/fluid/supplement intake                     Problem: Falls - Risk of  Goal: *Absence of Falls  Description: Document Marie Romeo Fall Risk and appropriate interventions in the flowsheet.   Outcome: Progressing Towards Goal  Note: Fall Risk Interventions:  Mobility Interventions: Bed/chair exit alarm    Mentation Interventions: Bed/chair exit alarm, More frequent rounding, Reorient patient, Room close to nurse's station, Update white board, Toileting rounds    Medication Interventions: Bed/chair exit alarm, Patient to call before getting OOB, Teach patient to arise slowly    Elimination Interventions: Bed/chair exit alarm, Call light in reach, Toileting schedule/hourly rounds, Urinal in reach              Problem: Patient Education: Go to Patient Education Activity  Goal: Patient/Family Education  Outcome: Progressing Towards Goal

## 2021-08-16 ENCOUNTER — HOSPITAL ENCOUNTER (INPATIENT)
Dept: ULTRASOUND IMAGING | Age: 86
Discharge: HOME | DRG: 682 | End: 2021-08-16
Attending: INTERNAL MEDICINE
Payer: MEDICARE

## 2021-08-16 LAB
ALBUMIN SERPL-MCNC: 2.5 G/DL (ref 3.4–5)
ANION GAP SERPL CALC-SCNC: 6 MMOL/L (ref 3–18)
APPEARANCE UR: CLEAR
BILIRUB UR QL: NEGATIVE
BUN SERPL-MCNC: 20 MG/DL (ref 7–18)
BUN/CREAT SERPL: 15 (ref 12–20)
CALCIUM SERPL-MCNC: 8.2 MG/DL (ref 8.5–10.1)
CHLORIDE SERPL-SCNC: 112 MMOL/L (ref 100–111)
CO2 SERPL-SCNC: 22 MMOL/L (ref 21–32)
COLOR UR: YELLOW
CREAT SERPL-MCNC: 1.3 MG/DL (ref 0.6–1.3)
CREAT UR-MCNC: 58 MG/DL (ref 30–125)
GLUCOSE SERPL-MCNC: 120 MG/DL (ref 74–99)
GLUCOSE UR STRIP.AUTO-MCNC: NEGATIVE MG/DL
HGB UR QL STRIP: NEGATIVE
KAPPA LC FREE SER-MCNC: 70 MG/L (ref 3.3–19.4)
KAPPA LC FREE/LAMBDA FREE SER: 2.01 {RATIO} (ref 0.26–1.65)
KETONES UR QL STRIP.AUTO: NEGATIVE MG/DL
LAMBDA LC FREE SERPL-MCNC: 34.9 MG/L (ref 5.7–26.3)
LEUKOCYTE ESTERASE UR QL STRIP.AUTO: NEGATIVE
MAGNESIUM SERPL-MCNC: 1.6 MG/DL (ref 1.6–2.6)
NITRITE UR QL STRIP.AUTO: NEGATIVE
PH UR STRIP: >8.5 [PH] (ref 5–8)
PHOSPHATE SERPL-MCNC: 2.1 MG/DL (ref 2.5–4.9)
POTASSIUM SERPL-SCNC: 3.4 MMOL/L (ref 3.5–5.5)
PROT UR STRIP-MCNC: NEGATIVE MG/DL
PROT UR-MCNC: 14 MG/DL
SODIUM SERPL-SCNC: 140 MMOL/L (ref 136–145)
SP GR UR REFRACTOMETRY: 1.01 (ref 1–1.03)
UROBILINOGEN UR QL STRIP.AUTO: 0.2 EU/DL (ref 0.2–1)

## 2021-08-16 PROCEDURE — 36415 COLL VENOUS BLD VENIPUNCTURE: CPT

## 2021-08-16 PROCEDURE — 81003 URINALYSIS AUTO W/O SCOPE: CPT

## 2021-08-16 PROCEDURE — 80069 RENAL FUNCTION PANEL: CPT

## 2021-08-16 PROCEDURE — 84156 ASSAY OF PROTEIN URINE: CPT

## 2021-08-16 PROCEDURE — 82570 ASSAY OF URINE CREATININE: CPT

## 2021-08-16 PROCEDURE — 77030040831 HC BAG URINE DRNG MDII -A

## 2021-08-16 PROCEDURE — 74011250636 HC RX REV CODE- 250/636: Performed by: INTERNAL MEDICINE

## 2021-08-16 PROCEDURE — 65660000000 HC RM CCU STEPDOWN

## 2021-08-16 PROCEDURE — 74011000250 HC RX REV CODE- 250: Performed by: INTERNAL MEDICINE

## 2021-08-16 PROCEDURE — 99232 SBSQ HOSP IP/OBS MODERATE 35: CPT | Performed by: EMERGENCY MEDICINE

## 2021-08-16 PROCEDURE — 74011250637 HC RX REV CODE- 250/637: Performed by: INTERNAL MEDICINE

## 2021-08-16 PROCEDURE — 76450000000

## 2021-08-16 PROCEDURE — 76770 US EXAM ABDO BACK WALL COMP: CPT

## 2021-08-16 PROCEDURE — 83735 ASSAY OF MAGNESIUM: CPT

## 2021-08-16 PROCEDURE — 99223 1ST HOSP IP/OBS HIGH 75: CPT | Performed by: NURSE PRACTITIONER

## 2021-08-16 PROCEDURE — 92526 ORAL FUNCTION THERAPY: CPT

## 2021-08-16 PROCEDURE — 2709999900 HC NON-CHARGEABLE SUPPLY

## 2021-08-16 PROCEDURE — 87086 URINE CULTURE/COLONY COUNT: CPT

## 2021-08-16 RX ORDER — SODIUM,POTASSIUM PHOSPHATES 280-250MG
1 POWDER IN PACKET (EA) ORAL 2 TIMES DAILY
Status: DISCONTINUED | OUTPATIENT
Start: 2021-08-16 | End: 2021-08-17 | Stop reason: HOSPADM

## 2021-08-16 RX ADMIN — Medication 10 ML: at 17:37

## 2021-08-16 RX ADMIN — POTASSIUM & SODIUM PHOSPHATES POWDER PACK 280-160-250 MG 1 PACKET: 280-160-250 PACK at 17:37

## 2021-08-16 RX ADMIN — SODIUM BICARBONATE: 84 INJECTION, SOLUTION INTRAVENOUS at 06:59

## 2021-08-16 RX ADMIN — Medication 10 ML: at 22:00

## 2021-08-16 NOTE — PROGRESS NOTES
In Patient Progress note      Admit Date: 8/13/2021    Impression:     1. JULIET on CKD 3B. Etio- prerenal . improving  2. CKD 3 B due to most likely age related decline in the gfr. .  3. Hypovolemic hypernatremia. Improving. 4. Normal ag met acidosis due to juliet. 5. Failure to thrive. 6. Altered ms. Better  7. Hypophosphatasia  8. Hypokalemia      Recommendations:      1. D/c IVF   2. Monitor renal function and electrolytes daily  3. F/u on the result of renal us, pending  4. UA still pending , reorder  5. Avoid NSAID's, IV dye. Please call with questions,    Lynsey Horner MD FASN  Cell [phone]  Pager: [phone]    Subjective:     - No acute over night events. - respiratory - stable  - hemodynamics - stable, no pressrs  - UOP-good  - Nutrition -ok    Objective:     Visit Vitals  /71 (BP 1 Location: Left upper arm, BP Patient Position: At rest)   Pulse (!) 110   Temp 97.8 °F (36.6 °C)   Resp 18   Ht 6' 1\" (1.854 m)   Wt 55.5 kg (122 lb 5.7 oz)   SpO2 98%   BMI 16.14 kg/m²         Intake/Output Summary (Last 24 hours) at 8/16/2021 1401  Last data filed at 8/16/2021 0659  Gross per 24 hour   Intake 2275.42 ml   Output    Net 2275.42 ml       Physical Exam:     Patient is in no apparent distress. Cachectic. HEENT: Head is normocephalic and atraumatic. Neck: no cervical lymphadenopathy or thyromegaly. Lungs: good air entry, clear to auscultation bilaterally. Trachea at the midline. Cardiovascular system: S1, S2, regular rate and rhythm. Abdomen: soft, non tender, non distended. Positive bowel sounds. Extremities: no clubbing, cyanosis or edema. Integumentary: skin is grossly intact.    Neurologic: Alert, oriented time three.      Data Review:    Recent Labs     08/15/21  0159   WBC 4.6   RBC 3.81*   HCT 34.3*   MCV 90.0   MCH 27.8   MCHC 30.9*   RDW 13.7     Recent Labs     08/16/21  0301 08/15/21  0932 08/15/21  0159 08/14/21  0613 08/13/21  1620   BUN 20* 25* 30* 44* 52*   CREA 1.30 1.44* 1.46* 2.03* 2.73*   CA 8.2* 8.7 8.2* 8.0* 9.2   ALB 2.5* 2.7*  --   --  3.4   K 3.4* 3.6 4.2 4.7 4.7    141 143 147* 139   * 117* 119* 121* 112*   CO2 22 18* 17* 16* 19*   PHOS 2.1* 2.4*  --   --  4.0   * 96 82 65* 114*       Betito Jeter MD

## 2021-08-16 NOTE — ROUTINE PROCESS
0730 Received report from [email or web address]. Patient alert and oriented to self. 1150 Condom catheter applied. Patient advised on urine specimen collection. 1330 Patient of the floor to Ultrasound. 1440 Patient back in the room. Family at bedside. Called Palliative care team and made them aware that Family are at bedside. 1600 Patient is now a DNR - arm band applied for safety. 1945 Bedside and Verbal shift change report given to Larry VIGIL (oncoming nurse) by me (offgoing nurse). Report included the following information SBAR, Kardex, Intake/Output, MAR, Recent Results and Cardiac Rhythm NSR.

## 2021-08-16 NOTE — CONSULTS
Palliative Medicine  [facility]: [phone] ([phone]  \[facility]\"": [address]: [phone]    Patient Name: Kwesi Mckeon  YOB: 1927    Date of Initial Consult: 8/16/2921   Reason for Consult: goals of care discussions   Requesting Provider: Dr Nick Cohen   Primary Care Physician: Elsy Morales MD      SUMMARY:   Kwesi Mckeon is a 80y.o. year old with a past history of Paget's disease of the bone, arthritis, chronic back pain, CKD stage 3B , who was admitted on 8/13/2021 from home  with a diagnosis of acute metabolic encephalopathy in the setting of dehydration. Current medical issues leading to Palliative Medicine involvement include: 80year old male who is admitted with altered mental status and dehydration. Palliative medicine is consulted for goals of care discussions. PALLIATIVE DIAGNOSES:   1. Goals of care   2. Acute metabolic encephalopathy   3. Chronic kidney disease stage III with acute kidney injury  4. Advanced age / debility        PLAN:   1. Goals of care; Mr. Nic Parker seen x2 today, alert knows he is in the hospital even with hearing aids very hard of hearing. This am. briefly discuss goals of care he shook his head no to resuscitation however not sure he can understand benefits and burdens. Spoke with his son Armand Sotomayor. Azucena Goodrich. who later arrived with patient's partner Ms. Claudy Fernandez and his caregiver Mr. Steven Pemberton and Mr. Kory Elizabeth  wife. Patient was able to recognize all family members and name them by name. There is no AMD on file he is not  and has 2 sons. Ask patient who he would like to make his medical decisions if he could not he very clearly and immediately said Santiago Goodrich. When I asked to point him out in the room he was able to correctly identify Mr. Kyung Sharma. AMD was completed naming Santiago Goodrich. is primary medical power of .   Patient was able to identify his other son Freddy Canchola is secondary medical power of . AMD signed by patient. Discussion was turned to benefits and burdens of resuscitation and advanced age patient was not able to understand this discussion. After discussing with son, caregiver, partner and daughter-in-law they all agreed they would not want to subject him to CPR or intubation for any reason. Post form introduced and  completed for DNR/DNI, limited interventions no feeding tubes, signed by son Consuelo Cortes. Evie Barroso. Copy to family and chart. Bedside RN and attending aware of goals of care changes. 2. Acute metabolic encephalopathy seems to be improving with hydration. However suspect some baseline cognitive dysfunction. 3. Chronic kidney disease with acute kidney injury. Nephrology on board. Creatinine improving with hydration   4. Advanced age / debility per son spends most of his time in bed. He is able to ambulate \" wall walks\" to bathroom. Incontinent at times here in hospital. PPS around 50 indicating bed to chair existence. 5. Initial consult note routed to primary continuity provider  6. Communicated plan of care with: Palliative IDT, son Mr Evie Barroso, life partner, Julianna Hernandez and attending     Patient/Health Care Proxy Stated Goals: Prolong life      TREATMENT PREFERENCES:   Code Status: DNR/DNI   Advance Care Planning:  [] [facility] Interdisciplinary Team has updated the ACP Navigator with [address] and Patient Capacity    Primary Decision [facility] ([address]): amd completed mike Gamez named as primary MPOA   [phone]  Mike Canchola named as secondary MPOA      Medical Interventions: Limited additional interventions     Artificially Administered Nutrition: No feeding tube     Other:  As far as possible, the palliative care team has discussed with patient / health care proxy about goals of care / treatment preferences for patient.      HISTORY:     History obtained from: chart, patient and family    CHIEF COMPLAINT: altered mental status, poor po    HPI/SUBJECTIVE:    The patient is:   [x] Verbal and participatory however limited with complex discussions  [] Non-participatory due to:   Please see summary  Alert answers some questions. Discussed     Clinical Pain Assessment (nonverbal scale for nonverbal patients): Clinical Pain Assessment  Severity: 0          Duration: for how long has pt been experiencing pain (e.g., 2 days, 1 month, years)  Frequency: how often pain is an issue (e.g., several times per day, once every few days, constant)     FUNCTIONAL ASSESSMENT:     Palliative Performance Scale (PPS):  PPS: 50    ECOG  ECOG Status : Limited self-care     PSYCHOSOCIAL/SPIRITUAL SCREENING:      Any spiritual / Latter-day concerns:  [] Yes /  [x] No    Caregiver Burnout:  [] Yes /  [x] No /  [] No Caregiver Present      Anticipatory grief assessment:   [x] Normal  / [] Maladaptive        REVIEW OF SYSTEMS:     Positive and pertinent negative findings in ROS are noted above in HPI. The following systems were [x] reviewed / [] unable to be reviewed as noted in HPI limited due to hearing   Other findings are noted below. Systems: constitutional, ears/nose/mouth/throat, respiratory, gastrointestinal, genitourinary, musculoskeletal, integumentary, neurologic, psychiatric, endocrine. Positive findings noted below. Modified ESAS Completed by: provider   Fatigue: 4 Drowsiness: 0   Depression: 0 Pain: 0   Anxiety: 0 Nausea: 0   Anorexia: 6 Dyspnea: 0           Stool Occurrence(s): 1        PHYSICAL EXAM:     Wt Readings from Last 3 Encounters:   08/16/21 55.5 kg (122 lb 5.7 oz)   07/08/21 62 kg (136 lb 11 oz)   02/05/18 68.9 kg (152 lb)     Blood pressure 111/74, pulse (!) 105, temperature 97.5 °F (36.4 °C), resp. rate 18, height 6' 1\" (1.854 m), weight 55.5 kg (122 lb 5.7 oz), SpO2 99 %.   Pain:  Pain Scale 1: Numeric (0 - 10)  Pain Intensity 1: 0                 Last bowel movement:  X 2 8/15/2021     Constitutional: elderly very [place] gentleman who is reclining in bed in NAD   Eyes: pupils equal   ENMT: moist MM  Cardiovascular: RRR  Respiratory: respirations not labored   Skin: warm and dry   Neurologic: alert, knows he is in hospital, [facility] difficultly with complex discussions, follows simple commands   Psychiatric: full affect, no hallucinations  Other:       HISTORY:     Active Problems:    Altered mental status (8/13/2021)      Failure to thrive in adult (8/13/2021)      Severe protein-calorie malnutrition ([address] (8/14/2021)      Past Medical History:   Diagnosis Date    Arthritis     Back pain, chronic       Past Surgical History:   Procedure Laterality Date    HX HEENT      relieved pressure R eye 1/2014      Family History   Problem Relation Age of Onset    Diabetes Father     No Known Problems Mother      History reviewed, no pertinent family history.   Social History     Tobacco Use    Smoking status: Never Smoker    Smokeless tobacco: Never Used   Substance Use Topics    Alcohol use: No     Alcohol/week: 0.8 standard drinks     Types: 1 Standard drinks or equivalent per week     No Known Allergies   Current Facility-Administered Medications   Medication Dose Route Frequency    potassium, sodium phosphates (NEUTRA-PHOS) packet 1 Packet  1 Packet Oral BID    sodium chloride (NS) flush 5-40 mL  5-40 mL IntraVENous Q8H    sodium chloride (NS) flush 5-40 mL  5-40 mL IntraVENous PRN    acetaminophen (TYLENOL) tablet 650 mg  650 mg Oral Q6H PRN    Or    acetaminophen (TYLENOL) suppository 650 mg  650 mg Rectal Q6H PRN    polyethylene glycol (MIRALAX) packet 17 g  17 g Oral DAILY PRN    ondansetron (ZOFRAN ODT) tablet 4 mg  4 mg Oral Q8H PRN    Or    ondansetron (ZOFRAN) injection 4 mg  4 mg IntraVENous Q6H PRN        LAB AND IMAGING FINDINGS:     Lab Results   Component Value Date/Time    WBC 4.6 08/15/2021 01:59 AM    HGB 10.6 (L) 08/15/2021 01:59 AM    PLATELET 100 (L) 08/15/2021 01:59 AM     Lab Results   Component Value Date/Time    Sodium 140 08/16/2021 03:01 AM    Potassium 3.4 (L) 08/16/2021 03:01 AM    Chloride 112 (H) 08/16/2021 03:01 AM    CO2 22 08/16/2021 03:01 AM    BUN 20 (H) 08/16/2021 03:01 AM    Creatinine 1.30 08/16/2021 03:01 AM    Calcium 8.2 (L) 08/16/2021 03:01 AM    Magnesium 1.6 08/16/2021 03:01 AM    Phosphorus 2.1 (L) 08/16/2021 03:01 AM      Lab Results   Component Value Date/Time    Alk. phosphatase 164 (H) 08/13/2021 04:20 PM    Protein, total 7.9 08/13/2021 04:20 PM    Albumin 2.5 (L) 08/16/2021 03:01 AM    Globulin 4.5 (H) 08/13/2021 04:20 PM     Lab Results   Component Value Date/Time    INR 1.2 08/15/2021 01:59 AM    Prothrombin time 15.1 08/15/2021 01:59 AM      Lab Results   Component Value Date/Time    Iron 25 (L) 04/20/2013 03:15 AM    TIBC 182 (L) 04/20/2013 03:15 AM    Iron % saturation 14 (L) 04/20/2013 03:15 AM      No results found for: PH, PCO2, PO2  No components found for: [place]   Lab Results   Component Value Date/Time     08/13/2021 04:20 PM    CK - MB <1.0 08/13/2021 04:20 PM              Total time: 70 minutes   Counseling / coordination time, spent as noted above:   > 50% counseling / coordination: yes with patient and family     Prolonged service was provided for  []30 min   []75 min in face to face time in the presence of the patient, spent as noted above.   Time Start:   Time End:

## 2021-08-16 NOTE — PROGRESS NOTES
Problem: Dysphagia (Adult)  Goal: *Acute Goals and Plan of Care (Insert Text)  Description:   Patient will:  1. Tolerate PO trials with 0 s/s overt distress in 4/5 trials  2. Utilize compensatory swallow strategies/maneuvers (decrease bite/sip, size/rate, alt. liq/sol) with min cues in 4/5 trials  3. Perform oral-motor/laryngeal exercises to increase oropharyngeal swallow function with min cues  4. Complete an objective swallow study (i.e., MBSS) to assess swallow integrity, r/o aspiration, and determine of safest LRD, min A as indicated/ordered by MD     Rec:     Minced/moist diet with thin liquids  Aspiration precautions  HOB >45 during po intake, remain >30 for 30-45 minutes after po   Small bites/sips; alternate liquid/solid with slow feeding rate   Oral care TID  Meds per pt preference  Outcome: Not Progressing Towards Goal   SPEECH LANGUAGE PATHOLOGY DYSPHAGIA TREATMENT    Patient: Ottoniel Uribe (52 y.o. male)  Date: 8/16/2021  Diagnosis: Failure to thrive in adult [R62.7]  Altered mental status [R41.82] <principal problem not specified>       Precautions: aspiration Fall, Skin  PLOF: As per H&P      ASSESSMENT:  Pt was seen at bedside for follow up dysphagia management. Pt tolerated one small sip of ensure and one tsp of mech soft solid presentation from lunch tray with max encouragement. Laryngeal elevation appeared functional to palpation. Pt refused all further PO despite max encouragement from SLP. Suspect limited oral intake. Recommend to continue minced/moist with thin liquids, aspiration precautions, oral care TID, and meds as tolerated. ST will continue to follow for further dysphagia management.      Progression toward goals:  []         Improving appropriately and progressing toward goals  []         Improving slowly and progressing toward goals  [x]         Not making progress toward goals - continue to monitor     PLAN:  Recommendations and Planned Interventions: See above  Patient continues to benefit from skilled intervention to address the above impairments. Continue treatment per established plan of care. Discharge Recommendations:  Hoang Alberto and To Be Determined     SUBJECTIVE:   Patient stated I just ate a cheeseburger. I don't know why you're trying to feed me more.     OBJECTIVE:   Cognitive and Communication Status:  Neurologic State: Alert, Confused, Eyes open spontaneously  Orientation Level: Oriented to person, Oriented to place, Disoriented to time  Cognition: Follows commands  Perception: Appears intact  Perseveration: No perseveration noted  Safety/Judgement: Awareness of environment    Dysphagia Treatment: see above    PAIN:  Start of Tx: appeared to be in no pain or discomfort  End of Tx: appeared to be in no pain or discomfort     After treatment:   []              Patient left in no apparent distress sitting up in chair  [x]              Patient left in no apparent distress in bed  [x]              Call bell left within reach  [x]              Nursing notified  []              Family present  []              Caregiver present  []              Bed alarm activated    COMMUNICATION/EDUCATION:   [x] Aspiration precautions; swallow safety; compensatory techniques  [x]        Patient/family able to participate in training and education   []  Patient unable to participate in training and education, education ongoing with staff   [] Patient understands goals and intent of therapy; neutral about participation     Thank you for this referral.    Manda King M.S. CCC-SLP/L  Speech-Language Pathologist

## 2021-08-16 NOTE — PROGRESS NOTES
[facility] Hospitalist Group  Progress Note    Patient: Yanely Hathaway Age: 80 y.o. : 3/14/1927 MR#: [id number] SSN: [id number]  Date/Time: 2021    Subjective:     Patient is laying in bed in no apparent distress, awake, follows simple commands. Hard of hearing    Assessment/Plan:     Acute metabolic encephalopathy in the setting of dehydration   Acute kidney injury on top of chronic kidney disease stage III  Advanced age  Failure to thrive  Lactic acidosis at admissionimproved  Dysphagia    Plan  Monitor renal function. Nephrology input appreciated  Diet as per speech  Nutrition evaluation  PT and OT  Discussed with patient's son in patient's room. Son wants patient to come home with home health care. Son states that he will have 24/ care at home  Discussed with   Palliative care input appreciated.   Patient is DNR and DNI  Dispositionlikely home tomorrow    Case discussed with:  [x]Patient  []Family  [x]Nursing  []Case Management  DVT Prophylaxis:  []Lovenox  [x]Hep SQ  []SCDs  []Coumadin   []On Heparin gtt    Objective:   VS:   Visit Vitals  /74 (BP 1 Location: Left upper arm, BP Patient Position: At rest)   Pulse (!) 105   Temp 97.5 °F (36.4 °C)   Resp 18   Ht 6' 1\" (1.854 m)   Wt 55.5 kg (122 lb 5.7 oz)   SpO2 99%   BMI 16.14 kg/m²      Tmax/24hrs: Temp (24hrs), Av.8 °F (36.6 °C), Min:97.5 °F (36.4 °C), Max:98.1 °F (36.7 °C)    Input/Output:     Intake/Output Summary (Last 24 hours) at 2021 1755  Last data filed at 2021 1500  Gross per 24 hour   Intake 3077.09 ml   Output    Net 3077.09 ml       General:  Awake, follows simple commands  Cardiovascular:  S1S2+, RRR  Pulmonary:  CTA b/l  GI:  Soft, BS+, NT, ND  Extremities:  No edema  Hard of hearing    Labs:    Recent Results (from the past 24 hour(s))   RENAL FUNCTION PANEL    Collection Time: 21  3:01 AM   Result Value Ref Range    Sodium 140 136 - 145 mmol/L    Potassium 3.4 (L) 3.5 - 5.5 mmol/L    Chloride 112 (H) 100 - 111 mmol/L    CO2 22 21 - 32 mmol/L    Anion gap 6 3.0 - 18 mmol/L    Glucose 120 (H) 74 - 99 mg/dL    BUN 20 (H) 7.0 - 18 MG/DL    Creatinine 1.30 0.6 - 1.3 MG/DL    BUN/Creatinine ratio 15 12 - 20      GFR est AA >60 >60 ml/min/1.73m2    GFR est non-AA 51 (L) >60 ml/min/1.73m2    Calcium 8.2 (L) 8.5 - 10.1 MG/DL    Phosphorus 2.1 (L) 2.5 - 4.9 MG/DL    Albumin 2.5 (L) 3.4 - 5.0 g/dL   MAGNESIUM    Collection Time: 08/16/21  3:01 AM   Result Value Ref Range    Magnesium 1.6 1.6 - 2.6 mg/dL   URINALYSIS W/ RFLX MICROSCOPIC    Collection Time: 08/16/21  3:20 PM   Result Value Ref Range    Color YELLOW      Appearance CLEAR      Specific gravity 1.008 1.005 - 1.030      pH (UA) >8.5 (H) 5.0 - 8.0    Protein Negative NEG mg/dL    Glucose Negative NEG mg/dL    Ketone Negative NEG mg/dL    Bilirubin Negative NEG      Blood Negative NEG      Urobilinogen 0.2 0.2 - 1.0 EU/dL    Nitrites Negative NEG      Leukocyte Esterase Negative NEG     PROTEIN URINE, RANDOM    Collection Time: 08/16/21  3:20 PM   Result Value Ref Range    Protein, urine random 14 (H) <11.9 mg/dL   CREATININE, UR, RANDOM    Collection Time: 08/16/21  3:20 PM   Result Value Ref Range    Creatinine, urine 58.00 30 - 125 mg/dL     Additional Data Reviewed:      Signed By: Jagdish Crowder MD     August 16, 2021

## 2021-08-16 NOTE — PROGRESS NOTES
Problem: Pressure Injury - Risk of  Goal: *Prevention of pressure injury  Description: Document Mynor Scale and appropriate interventions in the flowsheet. Outcome: Progressing Towards Goal  Note: Pressure Injury Interventions:  Sensory Interventions: Pressure redistribution bed/mattress (bed type), Turn and reposition approx. every two hours (pillows and wedges if needed), Pad between skin to skin, Minimize linen layers, Maintain/enhance activity level, Keep linens dry and wrinkle-free, Float heels, Discuss PT/OT consult with provider, Assess need for specialty bed, Assess changes in LOC    Moisture Interventions: Moisture barrier, Minimize layers, Maintain skin hydration (lotion/cream), Internal/External urinary devices, Check for incontinence Q2 hours and as needed, Assess need for specialty bed, Apply protective barrier, creams and emollients, Absorbent underpads    Activity Interventions: PT/OT evaluation, Pressure redistribution bed/mattress(bed type), Increase time out of bed, Assess need for specialty bed    Mobility Interventions: PT/OT evaluation, Pressure redistribution bed/mattress (bed type), HOB 30 degrees or less, Float heels, Assess need for specialty bed, Turn and reposition approx. every two hours(pillow and wedges)    Nutrition Interventions: Document food/fluid/supplement intake, Discuss nutritional consult with provider, Offer support with meals,snacks and hydration                     Problem: Patient Education: Go to Patient Education Activity  Goal: Patient/Family Education  Outcome: Progressing Towards Goal     Problem: Falls - Risk of  Goal: *Absence of Falls  Description: Document Juan Lester Fall Risk and appropriate interventions in the flowsheet.   Outcome: Progressing Towards Goal  Note: Fall Risk Interventions:  Mobility Interventions: Bed/chair exit alarm, Patient to call before getting OOB, PT Consult for mobility concerns, Communicate number of staff needed for ambulation/transfer    Mentation Interventions: Evaluate medications/consider consulting pharmacy, Door open when patient unattended, Bed/chair exit alarm, Adequate sleep, hydration, pain control, Eyeglasses and hearing aids, Update white board, More frequent rounding, Increase mobility    Medication Interventions: Teach patient to arise slowly, Evaluate medications/consider consulting pharmacy, Bed/chair exit alarm    Elimination Interventions:  Toileting schedule/hourly rounds, Patient to call for help with toileting needs, Call light in reach, Bed/chair exit alarm              Problem: Patient Education: Go to Patient Education Activity  Goal: Patient/Family Education  Outcome: Progressing Towards Goal

## 2021-08-16 NOTE — CONSULTS
[facility]  Good Help to Those in Need  [phone]    Consult Note  Patient Name: Radha Schmid  YOB: 1927  Age: 80 y.o. Date of Visit: 08/16/21  Patient Room: 205/01     Attending: Elizabeth Ferraro MD   Diagnosis: Failure to thrive in adult [R62.7]  Altered mental status [R41.82]        Witnessed goals of care discussions between patient and patient's son Janina Rose, along with other family and Susana Teran NP. Patient signed an AMD today naming his son Janina Rose as primary MPOA. Our team does not believe patient understands the complexities of further goals of care decisions. Discussed goals of care with patient's Orma Bing who wishes for DNR/DNI, limited interventions, no feeding tubes. POST form was completed reflecting these measures. Please refer to MELI Colbert note for detailed goals of care discussions. Thank you for the opportunity to assist in the care of this patient and their family. Please contact me at [phone] if you have any further questions.      Inna Boothe NP  Palliative Medicine   [place], [place]

## 2021-08-16 NOTE — PROGRESS NOTES
8/15/2021  19:45- Report received from previous nurse, Carina Ramirez RN.    20:15- Assessment completed- see flow sheet. Call light in reach-continue to monitor, Bed alarm on for safety. 8/16/2021 00:05- No change in conditon-continue to monitor-call light in reach. 02:00- Patient incontinent of small, loose, brown stool- was not enough stool to send a specimen to the lab. Continue to monitor-bed alarm on for safety. Call light in reach. 07:40-Bedside report given to oncoming nurse, Earnest Dodson RN.

## 2021-08-16 NOTE — PROGRESS NOTES
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  She is currently on methadone 130 mg daily   The mother's and the baby's UDS was positive for methadone  The cord blood toxicology is pending from 6/15  On DOL # 2, infant's JOSH scores were 12, 10 , and 12  The baby was admitted to the NICU for further management of  abstinence syndrome and morphine was started at 0 04 mg/kg Q3H on   Wean of morphine per protocol was initiated on   JOSH scores in the past 24 hours were  2, 2, 2, 5, 5, 5  High probability of life threatening clinical deterioration in infant's condition without treatment    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 Outcome: Progressing Towards Goal  Note: Pressure Injury Interventions:  Sensory Interventions: Assess changes in LOC, Keep linens dry and wrinkle-free, Turn and reposition approx. every two hours (pillows and wedges if needed)    Moisture Interventions: Absorbent underpads, Apply protective barrier, creams and emollients, Check for incontinence Q2 hours and as needed, Maintain skin hydration (lotion/cream), Moisture barrier    Activity Interventions: Increase time out of bed, Pressure redistribution bed/mattress(bed type), PT/OT evaluation    Mobility Interventions: HOB 30 degrees or less, Pressure redistribution bed/mattress (bed type), PT/OT evaluation, Turn and reposition approx. every two hours(pillow and wedges)    Nutrition Interventions: Document food/fluid/supplement intake    Friction and Shear Interventions: HOB 30 degrees or less                Problem: Falls - Risk of  Goal: *Absence of Falls  Description: Document Wagner Fall Risk and appropriate interventions in the flowsheet.   Outcome: Progressing Towards Goal  Note: Fall Risk Interventions:  Mobility Interventions: Bed/chair exit alarm, OT consult for ADLs, Patient to call before getting OOB, PT Consult for mobility concerns, PT Consult for assist device competence    Mentation Interventions: Bed/chair exit alarm, Door open when patient unattended, More frequent rounding    Medication Interventions: Bed/chair exit alarm, Patient to call before getting OOB    Elimination Interventions: Bed/chair exit alarm, Call light in reach, Patient to call for help with toileting needs              Problem: Nutrition Deficit  Goal: *Optimize nutritional status  Outcome: Progressing Towards Goal

## 2021-08-17 ENCOUNTER — HOME HEALTH ADMISSION (OUTPATIENT)
Dept: HOME HEALTH SERVICES | Facility: HOME HEALTH | Age: 86
End: 2021-08-17
Payer: MEDICARE

## 2021-08-17 VITALS
HEART RATE: 105 BPM | BODY MASS INDEX: 17.33 KG/M2 | OXYGEN SATURATION: 65 % | SYSTOLIC BLOOD PRESSURE: 96 MMHG | TEMPERATURE: 97.3 F | RESPIRATION RATE: 18 BRPM | WEIGHT: 130.73 LBS | HEIGHT: 73 IN | DIASTOLIC BLOOD PRESSURE: 61 MMHG

## 2021-08-17 LAB
ALBUMIN SERPL-MCNC: 2.6 G/DL (ref 3.4–5)
ANION GAP SERPL CALC-SCNC: 7 MMOL/L (ref 3–18)
BUN SERPL-MCNC: 16 MG/DL (ref 7–18)
BUN/CREAT SERPL: 13 (ref 12–20)
CALCIUM SERPL-MCNC: 8.3 MG/DL (ref 8.5–10.1)
CHLORIDE SERPL-SCNC: 113 MMOL/L (ref 100–111)
CO2 SERPL-SCNC: 22 MMOL/L (ref 21–32)
CREAT SERPL-MCNC: 1.28 MG/DL (ref 0.6–1.3)
GLUCOSE SERPL-MCNC: 82 MG/DL (ref 74–99)
PHOSPHATE SERPL-MCNC: 2.3 MG/DL (ref 2.5–4.9)
POTASSIUM SERPL-SCNC: 3.3 MMOL/L (ref 3.5–5.5)
SODIUM SERPL-SCNC: 142 MMOL/L (ref 136–145)

## 2021-08-17 PROCEDURE — 97530 THERAPEUTIC ACTIVITIES: CPT

## 2021-08-17 PROCEDURE — 36415 COLL VENOUS BLD VENIPUNCTURE: CPT

## 2021-08-17 PROCEDURE — 74011250637 HC RX REV CODE- 250/637: Performed by: INTERNAL MEDICINE

## 2021-08-17 PROCEDURE — 80069 RENAL FUNCTION PANEL: CPT

## 2021-08-17 PROCEDURE — 2709999900 HC NON-CHARGEABLE SUPPLY

## 2021-08-17 PROCEDURE — 99239 HOSP IP/OBS DSCHRG MGMT >30: CPT | Performed by: EMERGENCY MEDICINE

## 2021-08-17 RX ORDER — POLYETHYLENE GLYCOL 3350 17 G/17G
17 POWDER, FOR SOLUTION ORAL
Qty: 15 PACKET | Refills: 0 | Status: SHIPPED | OUTPATIENT
Start: 2021-08-17 | End: 2021-10-06

## 2021-08-17 RX ORDER — THERA TABS 400 MCG
1 TAB ORAL DAILY
Status: DISCONTINUED | OUTPATIENT
Start: 2021-08-17 | End: 2021-08-17 | Stop reason: HOSPADM

## 2021-08-17 RX ORDER — THERA TABS 400 MCG
1 TAB ORAL DAILY
Qty: 30 TABLET | Refills: 0 | Status: SHIPPED | OUTPATIENT
Start: 2021-08-17 | End: 2021-10-06

## 2021-08-17 RX ORDER — POLYETHYLENE GLYCOL 3350 17 G/17G
17 POWDER, FOR SOLUTION ORAL
Qty: 15 PACKET | Refills: 0 | Status: SHIPPED | OUTPATIENT
Start: 2021-08-17 | End: 2021-08-17 | Stop reason: SDUPTHER

## 2021-08-17 RX ADMIN — POTASSIUM BICARBONATE 20 MEQ: 782 TABLET, EFFERVESCENT ORAL at 09:36

## 2021-08-17 RX ADMIN — Medication 40 ML: at 09:36

## 2021-08-17 RX ADMIN — POTASSIUM & SODIUM PHOSPHATES POWDER PACK 280-160-250 MG 1 PACKET: 280-160-250 PACK at 09:36

## 2021-08-17 NOTE — PROGRESS NOTES
Problem: Pressure Injury - Risk of  Goal: *Prevention of pressure injury  Description: Document Mynor Scale and appropriate interventions in the flowsheet. 8/17/2021 0127 by Herbert Paul RN  Outcome: Progressing Towards Goal  Note: Pressure Injury Interventions:  Sensory Interventions: Assess changes in LOC    Moisture Interventions: Absorbent underpads, Internal/External urinary devices    Activity Interventions: Pressure redistribution bed/mattress(bed type), PT/OT evaluation    Mobility Interventions: HOB 30 degrees or less, PT/OT evaluation    Nutrition Interventions: Document food/fluid/supplement intake    Friction and Shear Interventions: HOB 30 degrees or less             8/17/2021 0127 by Herbert Paul RN  Outcome: Progressing Towards Goal  Note: Pressure Injury Interventions:  Sensory Interventions: Assess changes in LOC    Moisture Interventions: Absorbent underpads, Internal/External urinary devices    Activity Interventions: Pressure redistribution bed/mattress(bed type), PT/OT evaluation    Mobility Interventions: HOB 30 degrees or less, PT/OT evaluation    Nutrition Interventions: Document food/fluid/supplement intake    Friction and Shear Interventions: HOB 30 degrees or less                Problem: Patient Education: Go to Patient Education Activity  Goal: Patient/Family Education  Outcome: Progressing Towards Goal     Problem: Falls - Risk of  Goal: *Absence of Falls  Description: Document Wagner Fall Risk and appropriate interventions in the flowsheet.   Outcome: Progressing Towards Goal  Note: Fall Risk Interventions:  Mobility Interventions: Bed/chair exit alarm    Mentation Interventions: Bed/chair exit alarm, More frequent rounding, Reorient patient    Medication Interventions: Bed/chair exit alarm    Elimination Interventions: Call light in reach, Bed/chair exit alarm

## 2021-08-17 NOTE — PROGRESS NOTES
Nutrition Assessment     Type and Reason for Visit: Reassess, Positive nutrition screen, Consult    Nutrition Recommendations/Plan:  - Continue minced and moist dysphagia diet with Ensure Enlive, TID.  - Monitor and encourage po intake as tolerated. - Add daily multivitamin. Nutrition Assessment:  Pt not eating meals but consuming 50%-100% of supplements. Minced and moist diet with thin liquids per SLP. Goals of care include DNR/DNI, limited interventions and no feeding tubes. Pt alert and sitting up in bed today, breakfast offered but pt refused. States he is going home today, no familiy at bedside today    Malnutrition Assessment:  Malnutrition Status: Severe malnutrition     Estimated Daily Nutrient Needs:  Energy (kcal):  4599-9535  Protein (g):  52-63       Fluid (ml/day):  9185-3453    Nutrition Related Findings:  BM 8/16-loose. Neutra phos.  Hypokalemia replaced with 20 mEq K this morning      Current Nutrition Therapies:  ADULT DIET Dysphagia - Minced & Moist  ADULT ORAL NUTRITION SUPPLEMENT Breakfast, Lunch, Dinner; Standard High Calorie/High Protein    Anthropometric Measures:  · Height:  6' 1\" (185.4 cm)  · Current Body Wt:  59 kg (130 lb)  · BMI: 17.2    Nutrition Diagnosis:   · Severe malnutrition, In context of chronic illness related to inadequate protein-energy intake as evidenced by weight loss greater than or equal to 5% in 1 month, severe loss of subcutaneous fat, severe muscle loss    · Food & nutrition-related knowledge deficit related to  (mince and moist diet consistency) as evidenced by  (patient's family (son and his wife) having limited knowledge)    Nutrition Intervention:  Food and/or Nutrient Delivery: Continue current diet, Continue oral nutrition supplement, Vitamin supplement  Nutrition Education and Counseling: Education completed  Coordination of Nutrition Care: Continue to monitor while inpatient, Speech therapy    Goals:  1. PO nutrition intake will meet >75% of patient estimated nutritional needs within the next 7 days. 2. Provide nutrition intervention as appropriate with goals of care for the next 7 days.        Nutrition Monitoring and Evaluation:   Behavioral-Environmental Outcomes: None identified  Food/Nutrient Intake Outcomes: Food and nutrient intake, Supplement intake  Physical Signs/Symptoms Outcomes: Biochemical data, Chewing or swallowing, Meal time behavior, Nutrition focused physical findings    Discharge Planning:    Continue oral nutrition supplement, Continue current diet     Electronically signed by Dario Cadena RD on 8/17/2021 at 10:41 AM    Contact Number: [phone]

## 2021-08-17 NOTE — PROGRESS NOTES
Discharge order noted for today. Pt has been accepted to [id number] agency. Met with son via phone and are agreeable to the transition plan today. Transport has been arranged through family. Patient's discharge summary and home health  orders have been forwarded to [facility] home health  agency via [address] and call. Updated bedside RN, Umesh Friedman,  to the transition plan.   Discharge information has been documented on the AVS.       Ramon Washington RN BSN  Outcomes Manager    Pager # [phone]

## 2021-08-17 NOTE — HOME CARE
Received home health referral for [facility] for (SN, PT, OT, ST, HHA). Spoke with patient; girlfriend and caregiver in room;  patient identifiers verified. Explained home care services and routines. Demographics verified including insurance, phone and address confirmed. Patient has the following DME: [facility] and shower chair in home for use. Noted hospital bed suggested by therapy, per CM states, no additional equipment needed at this time. Caregivers available girlfriend, and caregiver. Orders noted and arranged to be processed to central intake. yes

## 2021-08-17 NOTE — ROUTINE PROCESS
Bedside and verbal report received from Becky Jimenez (offgoing nurse). Report included the following information; SBAR, MAR, LABS, Intake/output, Kardex, and summary of care. Patient resting quietly on his back. No noted distress. Call bell within reach.

## 2021-08-17 NOTE — ROUTINE PROCESS
Bedside and Verbal shift change report given to Rosanna (oncoming nurse) by Juventino Santiago (offgoing nurse). Report included the following information SBAR, Kardex, Intake/Output, MAR and Recent Results. Patient resting quietly in bed. No noted distress.

## 2021-08-17 NOTE — PROGRESS NOTES
Problem: Mobility Impaired (Adult and Pediatric)  Goal: *Acute Goals and Plan of Care (Insert Text)  Description: Physical Therapy Goals  Initiated 8/14/2021 and to be accomplished within 7 days. 1.  Patient will follow 90% of commands to maximize safety and active participation in mobility training. 2.  Patient will move from supine to sit and sit to supine in bed with supervision/set-up. 3.  Patient will maintain seated at edge of bed for 8 min with supervision/set-up to prepare for out of bed activity. 4.  Patient will perform sit to stand with supervision/set-up. 5.  Patient will transfer from bed to chair and chair to bed with supervision/set-up using the least restrictive device. 6.  Patient will ambulate with supervision/set-up for 25 feet with the least restrictive device. PLOF: Poor historian. Reports living alone in one story home and amb with ww. Outcome: Progressing Towards Goal    PHYSICAL THERAPY TREATMENT    Patient: Norman Banks (11 y.o. male)  Date: 8/17/2021  Diagnosis: Failure to thrive in adult [R62.7]  Altered mental status [R41.82] <principal problem not specified>       Precautions: Fall, Skin      ASSESSMENT:  Pt cleared to participate in PT session, pt received semi-reclined in bed and agreeable to therapy session. Pt's HR at rest 105-115. Pt needing hearing aide in L ear to hear PT this session. Pt completing bed mobility with CGA, sitting EOB pt's HR up to 150s and maintaining between 120-140 at EOB. Deferred standing activities due to HR, pt returned to supine. Pt declined eating breakfast. Pt positioned for comfort and educated to call for assist before getting up, pt verbalized understanding. Pt left with all needs met and call bell in reach. RN notified of position and participation. Bed alarm activated.      Progression toward goals:   []      Improving appropriately and progressing toward goals  [x]      Improving slowly and progressing toward goals  [] Not making progress toward goals and plan of care will be adjusted     PLAN:  Patient continues to benefit from skilled intervention to address the above impairments. Continue treatment per established plan of care. Discharge Recommendations:  Skilled Nursing Facility  Further Equipment Recommendations for Discharge:  rolling walker     SUBJECTIVE:   Patient stated I was sleeping.     OBJECTIVE DATA SUMMARY:   Critical Behavior:  Neurologic State: Alert  Orientation Level: Oriented to person  Cognition: Follows commands  Safety/Judgement: Awareness of environment  Functional Mobility Training:  Bed Mobility:     Supine to Sit: Contact guard assistance; Additional time  Sit to Supine: Contact guard assistance  Scooting: Contact guard assistance    Balance:  Sitting: Intact  Sitting - Static: Good (unsupported)     Posture:   Posture (WDL): Exceptions to WDL   Posture Assessment: Forward head;Trunk flexion      Pain:  Pain level pre-treatment: 0/10  Pain level post-treatment: 0/10     Activity Tolerance:   Fair activity tolerance, limited due to HR    Please refer to the flowsheet for vital signs taken during this treatment. After treatment:   [] Patient left in no apparent distress sitting up in chair  [x] Patient left in no apparent distress in bed  [x] Call bell left within reach  [x] Nursing notified  [] Caregiver present  [x] Bed alarm activated  [] SCDs applied      COMMUNICATION/EDUCATION:   [x]         Role of Physical Therapy in the acute care setting. [x]         Fall prevention education was provided and the patient/caregiver indicated understanding. [x]         Patient/family have participated as able in working toward goals and plan of care. [x]         Patient/family agree to work toward stated goals and plan of care. []         Patient understands intent and goals of therapy, but is neutral about his/her participation.   []         Patient is unable to participate in stated goals/plan of care: ongoing with therapy staff.  []         Other:        Mi Bowels, PT   Time Calculation: 18 mins

## 2021-08-17 NOTE — DISCHARGE INSTRUCTIONS
Patient Education     Altered Mental Status: Care Instructions  Your Care Instructions  Altered mental status is a change in how well your brain is working. As a result, you may be confused, be less alert than usual, or act in odd ways. This may include seeing or hearing things that aren't really there (hallucinations). A mental status change has many possible causes. For example, it may be the result of an infection, an imbalance of chemicals in the body, or a chronic disease such as diabetes or COPD. It can also be caused by things such as a head injury, taking certain medicines, or using alcohol or drugs. The doctor may do tests to look for the cause. These tests may include urine tests, blood tests, and imaging tests such as a CT scan. Sometimes a clear cause isn't found. But tests can help the doctor rule out a serious cause of your symptoms. A change in mental status can be scary. But mental status will often return to normal when the cause is treated. So it is important to get any follow-up testing or treatment the doctor has suggested. The doctor has checked you carefully, but problems can develop later. If you notice any problems or new symptoms, get medical treatment right away. Follow-up care is a key part of your treatment and safety. Be sure to make and go to all appointments, and call your doctor if you are having problems. It's also a good idea to know your test results and keep a list of the medicines you take. How can you care for yourself at home? · Be safe with medicines. Take your medicines exactly as prescribed. Call your doctor if you think you are having a problem with your medicine. · Have another adult stay with you until you are better. This can help keep you safe. Ask that person to watch for signs that your mental status is getting worse. When should you call for help? Call 911 anytime you think you may need emergency care.  For example, call if:  · You passed out (lost consciousness). Call your doctor now or seek immediate medical care if:  · Your mental status is getting worse. · You have new symptoms, such as a fever, chills, or shortness of breath. · You do not feel safe. Watch closely for changes in your health, and be sure to contact your doctor if:  · You do not get better as expected. Where can you learn more? Go to [employer]  Enter J452 in the search box to learn more about \"Altered Mental Status: Care Instructions. \"   © 1607-6602 Healthwise, Incorporated. Care instructions adapted under license by [employer] (which disclaims liability or warranty for this information). This care instruction is for use with your licensed healthcare professional. If you have questions about a medical condition or this instruction, always ask your healthcare professional. [address] any warranty or liability for your use of this information. Content Version: 37.0.130535; Current as of: November 20, 2015           Patient Education        Advance Directives: Care Instructions  Overview  An advance directive is a legal way to state your wishes at the end of your life. It tells your family and your doctor what to do if you can't say what you want. There are two main types of advance directives. You can change them any time your wishes change. Living will. This form tells your family and your doctor your wishes about life support and other treatment. The form is also called a declaration. Medical power of . This form lets you name a person to make treatment decisions for you when you can't speak for yourself. This person is called a health care agent (health care proxy, health care surrogate). The form is also called a durable power of  for health care. If you do not have an advance directive, decisions about your medical care may be made by a family member, or by a doctor or a  who doesn't know you.   It may help to think of an advance directive as a gift to the people who care for you. If you have one, they won't have to make tough decisions by themselves. Follow-up care is a key part of your treatment and safety. Be sure to make and go to all appointments, and call your doctor if you are having problems. It's also a good idea to know your test results and keep a list of the medicines you take. What should you include in an advance directive? Many states have a unique advance directive form. (It may ask you to address specific issues.) Or you might use a universal form that's approved by many states. If your form doesn't tell you what to address, it may be hard to know what to include in your advance directive. Use the questions below to help you get started. · Who do you want to make decisions about your medical care if you are not able to? · What life-support measures do you want if you have a serious illness that gets worse over time or can't be cured? · What are you most afraid of that might happen? (Maybe you're afraid of having pain, losing your independence, or being kept alive by machines.)  · Where would you prefer to die? (Your home? A hospital? A nursing home?)  · Do you want to donate your organs when you die? · Do you want certain Latter day practices performed before you die? When should you call for help? Be sure to contact your doctor if you have any questions. Where can you learn more? Go to http://www.gray.com/  Enter R264 in the search box to learn more about \"Advance Directives: Care Instructions. \"  Current as of: July 17, 2020               Content Version: 12.8  © 7402-7705 Healthwise, Incorporated. Care instructions adapted under license by [employer] (which disclaims liability or warranty for this information).  If you have questions about a medical condition or this instruction, always ask your healthcare professional. Rachel Reis, [employer] disclaims any warranty or liability for your use of this information. Patient Education        Fatigue in Children: Care Instructions  Your Care Instructions     Fatigue is a feeling of tiredness, exhaustion, or lack of energy. Your child may feel this way because of too much or not enough activity. It can also come from stress, lack of sleep, boredom, and poor diet. Many medical problems, including viral infections, can cause fatigue. Emotional problems, especially depression, are often the cause. Fatigue is usually a symptom of another problem. Treatment depends on the cause. For example, if your child has fatigue because of a health problem, treating the health problem also treats the fatigue. If depression or anxiety is the cause, treatment may help. Follow-up care is a key part of your child's treatment and safety. Be sure to make and go to all appointments, and call your doctor if your child is having problems. It's also a good idea to know your child's test results and keep a list of the medicines your child takes. How can you care for your child at home? · Make sure your child gets regular exercise. But don't let him or her overdo it. Go back and forth between rest and exercise. · Make sure your child gets plenty of rest.  · Help your child eat a healthy diet. Do not let your child skip meals, especially breakfast.  · Limit medicines that can cause fatigue. These include ones for colds or allergies. When should you call for help? Watch closely for changes in your child's health, and be sure to contact your doctor if your child is not getting better as expected. Where can you learn more? Go to http://www.gray.com/  Enter T214 in the search box to learn more about \"Fatigue in Children: Care Instructions. \"  Current as of: February 26, 2020               Content Version: 12.8  © 9815-0019 [employer].    Care instructions adapted under license by [employer] (which disclaims liability or warranty for this information). If you have questions about a medical condition or this instruction, always ask your healthcare professional. [address] any warranty or liability for your use of this information.

## 2021-08-17 NOTE — PROGRESS NOTES
Patient unable to sign. Son Mery Montoya  received 2nd IM letter informing them of their right to appeal the discharge via telephone.

## 2021-08-17 NOTE — PROGRESS NOTES
Reason for Admission:  Failure to thrive in adult [R62.7]  Altered mental status [R41.82]                 RUR Score:    13            Plan for utilizing home health:    yes                      Likelihood of Readmission:   LOW                         Transition of Care Plan:              Initial assessment completed with relative(s). Son Mr Venancio Lopez Cognitive status of patient: disoriented. Dementia    Face sheet information confirmed:  yes. The patient designates son  Per Adv Directive to participate in his discharge plan and to receive any needed information. This patient lives in a single family home with adult caretaker and other:  Sig other. Patient is not able to navigate steps as needed. Prior to hospitalization, patient was considered to be independent with ADLs/IADLS : no . If not independent,  patient needs assist with : dressing, bathing, food preparation, cooking, toileting and grooming    Patient has a current ACP document on file: yes      Healthcare Decision Maker:     Click here to complete [employer] including selection of the Healthcare Decision Maker Relationship (ie \"Primary\")    The son will be available to transport patient home upon discharge. The patient already has [employer], and  medical equipment available in the home. Patient is not currently active with home health. .  Patient has not stayed in a skilled nursing facility or rehab. Was  stay within last 60 days : no. This patient is on dialysis :no      List of available Home Health agencies were provided and reviewed with the patient prior to discharge. Freedom of choice signed: yes, for  [facility]. Currently, the discharge plan is Home with [address] [place] [address]. The patient states that he can obtain his medications from the pharmacy, and take his medications as directed. Patient's current insurance is Medicare and [address] Management Interventions  PCP Verified by CM:  Yes ( [place])  Mode of Transport at Discharge: Self (son)  Transition of Care Consult (CM Consult): Home Health  Current Support Network: Own Home, Family Lives Nearby, Has Personal Caregivers  Confirm Follow Up Transport: Family  The Plan for Transition of Care is Related to the Following Treatment Goals : home health  The Patient and/or Patient Representative was Provided with a Choice of Provider and Agrees with the Discharge Plan?: Yes  Freedom of Choice List was Provided with Basic Dialogue that Supports the Patient's Individualized Plan of Care/Goals, Treatment Preferences and Shares the Quality Data Associated with the Providers?: Yes  Discharge Location  Discharge Placement: Home with home health        Ramon Washington RN BSN  Outcomes Manager    Pager # [phone]

## 2021-08-17 NOTE — DISCHARGE SUMMARY
[address] [place] [address] [facility], [address]     DISCHARGE SUMMARY    Name: Atiya García MRN: [id number]   Age / Sex: 80 y.o. / male CSN: [id number]   YOB: 1927 Length of Stay: 4 days   Admit Date: 8/13/2021 Discharge Date:        PRIMARY CARE PHYSICIAN: Zonia Laws MD      DISCHARGE DIAGNOSES:    Acute metabolic encephalopathy in the setting of dehydration   Acute kidney injury on top of chronic kidney disease stage III  Advanced age  Failure to thrive  Lactic acidosis at admissionimproved  Dysphagia    CONSULTS CALLED: Nephrology      PROCEDURES DONE: None      COURSE IN [facility] [address]: This is a 60-year-old male who was brought to the ED with some fatigue and change in mental status. Patient was evaluated and was admitted. Patient was noted to have acute metabolic encephalopathy. Patient was noted to have dehydration and had some acute kidney injury on top of chronic kidney disease stage III. Patient received IV fluids. Labs and electrolytes were monitored. Renal function showed improvement. Nephrology also saw the patient. Patient had some dysphagia and was placed on a diet as per speech therapy. Patient's mentation returned back to baseline. Case management was involved. Palliative care was also consulted. Patient was made DNR and DNI. Patient's son completed a post form. PT OT were consulted. Patient's son wished for the patient to come home and stated he would provide 24/7 care. Arrangements were made and patient was discharged home. MEDICATIONS ON DISCHARGE:    Current Discharge Medication List      START taking these medications    Details   polyethylene glycol (MIRALAX) 17 gram packet Take 1 Packet by mouth daily as needed for Constipation.   Qty: 15 Packet, Refills: 0  Start date: 8/17/2021         CONTINUE these medications which have NOT CHANGED    Details   bisacodyl (DULCOLAX) 5 mg EC tablet Take 1 Tab by mouth daily as needed for Constipation. Qty: 25 Tab, Refills: 2      timolol (TIMOPTIC) 0.5 % ophthalmic solution Administer 1 Drop to both eyes two (2) times a day. STOP taking these medications       gabapentin (NEURONTIN) 300 mg capsule Comments:   Reason for Stopping:                   CONDITION ON DISCHARGE: Stable. DISPOSITION: Home with home health care      FOLLOW-UP RECOMMENDATIONS:   Follow-up Information     Follow up With Specialties Details Why Contact Info    Albina Pisano MD Internal Medicine   [address] [place] [address]  [phone]            OTHER INSTRUCTIONS:        TIME SPENT ON DISCHARGE ACTIVITIES: More than 35 minutes. Dragon medical dictation software was used for portions of this report. Unintended errors may occur.       Signed:  Luis Carlos Lundberg MD      8/17/2021

## 2021-08-18 ENCOUNTER — HOME CARE VISIT (OUTPATIENT)
Dept: SCHEDULING | Facility: HOME HEALTH | Age: 86
End: 2021-08-18
Payer: MEDICARE

## 2021-08-18 VITALS
SYSTOLIC BLOOD PRESSURE: 98 MMHG | DIASTOLIC BLOOD PRESSURE: 60 MMHG | TEMPERATURE: 99.1 F | OXYGEN SATURATION: 97 % | RESPIRATION RATE: 17 BRPM | HEART RATE: 97 BPM

## 2021-08-18 LAB
ALBUMIN SERPL ELPH-MCNC: 3 G/DL (ref 2.9–4.4)
ALBUMIN/GLOB SERPL: 1.1 {RATIO} (ref 0.7–1.7)
ALPHA1 GLOB SERPL ELPH-MCNC: 0.2 G/DL (ref 0–0.4)
ALPHA2 GLOB SERPL ELPH-MCNC: 0.6 G/DL (ref 0.4–1)
B-GLOBULIN SERPL ELPH-MCNC: 0.8 G/DL (ref 0.7–1.3)
BACTERIA SPEC CULT: ABNORMAL
CC UR VC: ABNORMAL
GAMMA GLOB SERPL ELPH-MCNC: 1.4 G/DL (ref 0.4–1.8)
GLOBULIN SER-MCNC: 2.9 G/DL (ref 2.2–3.9)
IGA SERPL-MCNC: 255 MG/DL (ref 61–437)
IGG SERPL-MCNC: 1401 MG/DL (ref 603–1613)
IGM SERPL-MCNC: 55 MG/DL (ref 15–143)
INTERPRETATION SERPL IEP-IMP: ABNORMAL
M PROTEIN SERPL ELPH-MCNC: ABNORMAL G/DL
PROT SERPL-MCNC: 5.9 G/DL (ref 6–8.5)
SERVICE CMNT-IMP: ABNORMAL

## 2021-08-18 PROCEDURE — 3331090001 HH PPS REVENUE CREDIT

## 2021-08-18 PROCEDURE — 400018 HH-NO PAY CLAIM PROCEDURE

## 2021-08-18 PROCEDURE — 3331090002 HH PPS REVENUE DEBIT

## 2021-08-18 PROCEDURE — G0151 HHCP-SERV OF PT,EA 15 MIN: HCPCS

## 2021-08-18 PROCEDURE — 400013 HH SOC

## 2021-08-18 NOTE — HOME HEALTH
PT called into Dr. Rosanna Sanchez office and spoke to [place] regarding the following:  low BP 98/60 with possible dehydration, temp 100.4 and 99.1 (both temples),  loose stoolsx3 in the past 2 days, as well as pt in 7/10 pain and has no pain meds on board due to hospital DC. PT requested pain treatment and Dr. Rosanna Sanchez advised he can take Tylenol Extra Strength 500 mg as directed to help with pain until seen on Friday in office. PT made Dr. Rosanna Sanchez aware that OT and HHA are DC as no need and CG/son/pt declined. Both son and pt's life partner Morena Correia were left voicemails in regards to Tylenol. ADMISSION OASIS  Admission Summary: Mr. Steve Ortiz is a 80year old male being admitted to home health for PT/SN/OT/HHA services. PT called into Dr. Rosanna Sanchez office and spoke to [place] regarding the following: PT made Dr. Rosanna Sanchez aware that OT and HHA are DC as no need and CG/son/pt declined. The patient's caregiver/representative present, & able and willing to provide care daily, (life partner, son, and friend). Patient/CG participated in goal setting and care planning and are agreeable to the care plan. Discharge planning/training was initiated for return to PLOF to max potential.  Admission booklet reviewed with patient; including review of rights and responsibilities, discharge/transfer policies, and contact information for , , Medicare, [facility], and outside resources for independence. PMHx: Arthritis    Back pain, chronic   SUBJECTIVE: Pt having a difficult time hearing PT today as hearing aide battery is dead. PT did best attempt to commnicate and pt's CG present to help answer questions. Pt did reports his back hurt 7/10. See Medication section below for MD call and resolution.     LIVING SITUATION: lives with life partner, has son local and supportive and has another CG that assists pt on Sat/Sun/Wedn/Fri.   REQUIRES CAREGIVER ASSISTANCE FOR: meals, ADLs, transportation, bed mob, transfers, ambulation, toileting and meds  PLOF:   Pt lives with life partner and has gentleman that stays with him through the days to help pt. Pt lives in 1 level home with steps to enter with rail. Pt was needing some assist with showers and ADLs, as well as life partner does all the home tasks including cooking, laundry, and cleaning. Pt life partner manages medications. Pt was able to ambulate with cane in home and in community. Pt needed someone with him in community. DME: cane, shower chair  MEDICATIONS REVIEWED AND UPDATED: Medications reconciled and all medications are available in the home this visit. (Pt has Miralax prescription but advised CG that she does not need to fill as he has Ducolox ordered and pt is having loose stools. Dr Emre Boone agreed when spoke to MD on the phone. The following education was provided regarding medications,  medication interactions, and look a like medications: Continue medication as prescribed by MD. Medications are effective at this time. PT requested pain treatment and Dr. Hannah Altamirano advised he can take Tylenol Extra Strength 500 mg as directed to help with pain until seen on Friday in office. NEXT MD APPT:  Dr. Agnes Henry office 8/19/21  ROM:B LE and UE WNL   STRENGTH:  unable to assess due to GAGANDEEP [facility] and not following commands. WOUNDS: skin intact and no breakdown noted. BED MOBILITY: sup > sit min A to SBA, and sit > sup min A with LEs. pt was sitting up and down several times in bed due to back pain. TRANSFERS: sit to stand from bed with close SBA to CGA/min A for safety. Pt impulsive with stands and instructed to slow down and use hands for push off. GAIT: pt ambulated 25' x2 to bathroom and back holding onto walls and CG. Pt became incontinent of stool on his way to bathroom. CG assisted pt to clean up with hygiene and changing clothes. Pt dependent at this time for hygiene. STAIRS: NT  BALANCE: Unable to test balance today due to GAG[facility]EP [facility] and not following commands.   Pt balance unsteady and using walls and min A to ambulate 25'x2  PATIENT EDUCATION PROVIDED THIS VISIT: safety, heat for back pain with barrier between back and heat pad for 20 min on and 40 min off. Also can alternate with ice with same frequency and protection instructed. No written HEP given at this time due to inability for pt to follow along due to GAGANDEEP [facility] CONTINUED NEED FOR THE FOLLOWING SKILLS: HH PT is medically necessary to address back pain,  decreased strength/endurance,  decreased independence with functional transfers, impaired gait, impaired stair negotiation, and impaired balance in order to improve functional independence, quality of life, return to PLOF, and reduce the risk for falls. PLAN: 1w1, 2w3.     DISCHARGE PLANNING DISCUSSED: Discharge to self and family under MD supervision once all goals have been met or patient has reached max potential

## 2021-08-19 ENCOUNTER — HOME CARE VISIT (OUTPATIENT)
Dept: HOME HEALTH SERVICES | Facility: HOME HEALTH | Age: 86
End: 2021-08-19
Payer: MEDICARE

## 2021-08-19 ENCOUNTER — HOME CARE VISIT (OUTPATIENT)
Dept: SCHEDULING | Facility: HOME HEALTH | Age: 86
End: 2021-08-19
Payer: MEDICARE

## 2021-08-19 PROCEDURE — G0299 HHS/HOSPICE OF RN EA 15 MIN: HCPCS

## 2021-08-19 PROCEDURE — 3331090002 HH PPS REVENUE DEBIT

## 2021-08-19 PROCEDURE — 3331090001 HH PPS REVENUE CREDIT

## 2021-08-20 ENCOUNTER — HOME CARE VISIT (OUTPATIENT)
Dept: HOME HEALTH SERVICES | Facility: HOME HEALTH | Age: 86
End: 2021-08-20
Payer: MEDICARE

## 2021-08-20 ENCOUNTER — HOME CARE VISIT (OUTPATIENT)
Dept: SCHEDULING | Facility: HOME HEALTH | Age: 86
End: 2021-08-20
Payer: MEDICARE

## 2021-08-20 PROCEDURE — 3331090002 HH PPS REVENUE DEBIT

## 2021-08-20 PROCEDURE — 3331090001 HH PPS REVENUE CREDIT

## 2021-08-20 NOTE — HOME HEALTH
The visit was missed due to the following reason(s): MD appointment. pt called to tell we would be getting lab work, cg stating we would not due to MD appointment pt is seeing PCP today and does not need visit due to duplication of services, cg stating they will get labs if the MD wants them at the office. visit will be missed visit.  aware.

## 2021-08-20 NOTE — HOME HEALTH
Skilled reason for visit: assessment, teaching, vitals, Initial evaluation CKD, failure to thrive, weight loss. Caregiver involvement: significant other present for visit. Son is POA. Girlfriend provides daily care- assist with meds, meals, ADLS, transportation. Reports some sundowning with confusion at night. Medications reviewed and all medications are available in the home this visit. The following education was provided regarding medications, medication interactions, and look alike medications (specify): all meds reviewed with patient including purpose of each, how and when to take.  megace and tylenol added to med list- taking at home. Takes tylenol PRN for back pain. Medications  are somewhat effective at this time. Pt/cg report some increase in appetite when using megace- ate half sandwich today, drinking ensure during SN eval visit. Encouraged to drink several times daily and eat small frequent meals, sitting on side of bed. Home health supplies by type and quantity ordered/delivered this visit include: NA  Patient education provided this visit: per care plan. Home safety and fall prevention. Infection control, hand washing and hygeine. Importance of med compliance and MD follow up. Reviewed turning and repositioning every 2 hours to prevent skin breakdown, keeping skin clean and dry and promptly removing soiled diapers and linens to protect skin integrity. Using pillows and wedges to prop patient up, floating heels. Nutrition and hydration. Pain management including positioning, medications, heat/ice therapy. Skilled Care Performed this visit: assessment, teaching, vitals, skin assessment-no skin breakdown, using zinc ointment for protection. med review see above meds added to list, SN initial evaluation performed with care plan and orders entered.  Scheduling notified of visit frequency 1w5, 2prn Called  to augie and she will request Telehealth orders- for weight monitoring, vitals daily   Agency Progress toward goals: Initial eval this visit  Patient's Progress towards personal goals: Initial eval this visit  Home exercise program: per therapy- PT. Up for meals, stand/walk hourly. HEP for breathing/incontinence exerces provided/reviewed with admit handbook. Using IS every hour while awake. Continued need for the following skills: Nursing, Physical Therapy and Occupational Therapy  Plan for next visit: assessment, teaching, vitals, nutrition assessment/weight, med review. Patient and/or caregiver notified and agrees to changes in the Plan of Care YES    The following discharge planning was discussed with the pt/caregiver: Pt will be dc when goals met, teaching complete, pt is knowledgable regarding medications and disease process and management.

## 2021-08-21 PROCEDURE — 3331090002 HH PPS REVENUE DEBIT

## 2021-08-21 PROCEDURE — 3331090001 HH PPS REVENUE CREDIT

## 2021-08-22 VITALS
SYSTOLIC BLOOD PRESSURE: 102 MMHG | HEART RATE: 92 BPM | OXYGEN SATURATION: 97 % | TEMPERATURE: 99.2 F | RESPIRATION RATE: 18 BRPM | DIASTOLIC BLOOD PRESSURE: 58 MMHG

## 2021-08-22 PROCEDURE — 3331090001 HH PPS REVENUE CREDIT

## 2021-08-22 PROCEDURE — 3331090002 HH PPS REVENUE DEBIT

## 2021-08-23 PROCEDURE — 3331090002 HH PPS REVENUE DEBIT

## 2021-08-23 PROCEDURE — 3331090001 HH PPS REVENUE CREDIT

## 2021-08-24 ENCOUNTER — HOME CARE VISIT (OUTPATIENT)
Dept: SCHEDULING | Facility: HOME HEALTH | Age: 86
End: 2021-08-24
Payer: MEDICARE

## 2021-08-24 ENCOUNTER — HOSPITAL ENCOUNTER (EMERGENCY)
Age: 86
Discharge: HOME | End: 2021-08-24
Attending: EMERGENCY MEDICINE
Payer: MEDICARE

## 2021-08-24 ENCOUNTER — HOME CARE VISIT (OUTPATIENT)
Dept: HOME HEALTH SERVICES | Facility: HOME HEALTH | Age: 86
End: 2021-08-24
Payer: MEDICARE

## 2021-08-24 ENCOUNTER — APPOINTMENT (OUTPATIENT)
Dept: GENERAL RADIOLOGY | Age: 86
End: 2021-08-24
Attending: PHYSICIAN ASSISTANT
Payer: MEDICARE

## 2021-08-24 VITALS
RESPIRATION RATE: 19 BRPM | DIASTOLIC BLOOD PRESSURE: 68 MMHG | OXYGEN SATURATION: 100 % | SYSTOLIC BLOOD PRESSURE: 109 MMHG | HEART RATE: 90 BPM | TEMPERATURE: 97.2 F

## 2021-08-24 DIAGNOSIS — E86.0 SEVERE DEHYDRATION: Primary | ICD-10-CM

## 2021-08-24 LAB
ABO + RH BLD: NORMAL
ALBUMIN SERPL-MCNC: 2.5 G/DL (ref 3.4–5)
ALBUMIN/GLOB SERPL: 0.7 {RATIO} (ref 0.8–1.7)
ALP SERPL-CCNC: 144 U/L (ref 45–117)
ALT SERPL-CCNC: 15 U/L (ref 16–61)
ANION GAP SERPL CALC-SCNC: 11 MMOL/L (ref 3–18)
APPEARANCE UR: CLEAR
ARTERIAL PATENCY WRIST A: POSITIVE
AST SERPL-CCNC: 28 U/L (ref 10–38)
ATRIAL RATE: 133 BPM
BACTERIA URNS QL MICRO: NEGATIVE /HPF
BASE DEFICIT BLD-SCNC: 3.6 MMOL/L
BASOPHILS # BLD: 0 K/UL (ref 0–0.1)
BASOPHILS NFR BLD: 0 % (ref 0–2)
BDY SITE: ABNORMAL
BILIRUB SERPL-MCNC: 0.5 MG/DL (ref 0.2–1)
BILIRUB UR QL: NEGATIVE
BLOOD GROUP ANTIBODIES SERPL: NORMAL
BODY TEMPERATURE: 97.2
BUN SERPL-MCNC: 43 MG/DL (ref 7–18)
BUN/CREAT SERPL: 17 (ref 12–20)
CALCIUM SERPL-MCNC: 7.7 MG/DL (ref 8.5–10.1)
CALCULATED P AXIS, ECG09: 81 DEGREES
CALCULATED R AXIS, ECG10: 67 DEGREES
CALCULATED T AXIS, ECG11: 72 DEGREES
CHLORIDE SERPL-SCNC: 113 MMOL/L (ref 100–111)
CO2 SERPL-SCNC: 16 MMOL/L (ref 21–32)
COLOR UR: YELLOW
CREAT SERPL-MCNC: 2.49 MG/DL (ref 0.6–1.3)
DIAGNOSIS, 93000: NORMAL
DIFFERENTIAL METHOD BLD: ABNORMAL
EOSINOPHIL # BLD: 0.3 K/UL (ref 0–0.4)
EOSINOPHIL NFR BLD: 3 % (ref 0–5)
EPITH CASTS URNS QL MICRO: NORMAL /LPF (ref 0–5)
ERYTHROCYTE [DISTWIDTH] IN BLOOD BY AUTOMATED COUNT: 14.1 % (ref 11.6–14.5)
GAS FLOW.O2 O2 DELIVERY SYS: ABNORMAL L/MIN
GLOBULIN SER CALC-MCNC: 3.7 G/DL (ref 2–4)
GLUCOSE SERPL-MCNC: 98 MG/DL (ref 74–99)
GLUCOSE UR STRIP.AUTO-MCNC: NEGATIVE MG/DL
HCO3 BLD-SCNC: 19 MMOL/L (ref 22–26)
HCT VFR BLD AUTO: 31 % (ref 36–48)
HGB BLD-MCNC: 10.1 G/DL (ref 13–16)
HGB UR QL STRIP: NEGATIVE
KETONES UR QL STRIP.AUTO: ABNORMAL MG/DL
LEUKOCYTE ESTERASE UR QL STRIP.AUTO: NEGATIVE
LIPASE SERPL-CCNC: 104 U/L (ref 73–393)
LYMPHOCYTES # BLD: 1.1 K/UL (ref 0.9–3.6)
LYMPHOCYTES NFR BLD: 10 % (ref 21–52)
MAGNESIUM SERPL-MCNC: 2.5 MG/DL (ref 1.6–2.6)
MCH RBC QN AUTO: 27.2 PG (ref 24–34)
MCHC RBC AUTO-ENTMCNC: 32.6 G/DL (ref 31–37)
MCV RBC AUTO: 83.3 FL (ref 78–100)
MONOCYTES # BLD: 0.3 K/UL (ref 0.05–1.2)
MONOCYTES NFR BLD: 3 % (ref 3–10)
NEUTS SEG # BLD: 9.3 K/UL (ref 1.8–8)
NEUTS SEG NFR BLD: 84 % (ref 40–73)
NITRITE UR QL STRIP.AUTO: NEGATIVE
P-R INTERVAL, ECG05: 130 MS
PCO2 BLD: 24.6 MMHG (ref 35–45)
PH BLD: 7.49 [PH] (ref 7.35–7.45)
PH UR STRIP: 6 [PH] (ref 5–8)
PLATELET # BLD AUTO: 229 K/UL (ref 135–420)
PLATELET COMMENTS,PCOM: ABNORMAL
PMV BLD AUTO: 11.2 FL (ref 9.2–11.8)
PO2 BLD: 119 MMHG (ref 80–100)
POTASSIUM SERPL-SCNC: 4.1 MMOL/L (ref 3.5–5.5)
PROT SERPL-MCNC: 6.2 G/DL (ref 6.4–8.2)
PROT UR STRIP-MCNC: 30 MG/DL
Q-T INTERVAL, ECG07: 372 MS
QRS DURATION, ECG06: 106 MS
QTC CALCULATION (BEZET), ECG08: 553 MS
RBC # BLD AUTO: 3.72 M/UL (ref 4.35–5.65)
RBC #/AREA URNS HPF: NEGATIVE /HPF (ref 0–5)
RBC MORPH BLD: ABNORMAL
SAO2 % BLD: 99.1 % (ref 92–97)
SERVICE CMNT-IMP: ABNORMAL
SODIUM SERPL-SCNC: 140 MMOL/L (ref 136–145)
SP GR UR REFRACTOMETRY: 1.02 (ref 1–1.03)
SPECIMEN EXP DATE BLD: NORMAL
SPECIMEN TYPE: ABNORMAL
TROPONIN I SERPL-MCNC: 0.07 NG/ML (ref 0–0.04)
UROBILINOGEN UR QL STRIP.AUTO: 1 EU/DL (ref 0.2–1)
VENTRICULAR RATE, ECG03: 133 BPM
WBC # BLD AUTO: 11 K/UL (ref 4.6–13.2)
WBC URNS QL MICRO: NEGATIVE /HPF (ref 0–4)

## 2021-08-24 PROCEDURE — 3331090002 HH PPS REVENUE DEBIT

## 2021-08-24 PROCEDURE — 93005 ELECTROCARDIOGRAM TRACING: CPT

## 2021-08-24 PROCEDURE — 3331090001 HH PPS REVENUE CREDIT

## 2021-08-24 PROCEDURE — G0157 HHC PT ASSISTANT EA 15: HCPCS

## 2021-08-24 PROCEDURE — 83690 ASSAY OF LIPASE: CPT

## 2021-08-24 PROCEDURE — 96360 HYDRATION IV INFUSION INIT: CPT

## 2021-08-24 PROCEDURE — 86901 BLOOD TYPING SEROLOGIC RH(D): CPT

## 2021-08-24 PROCEDURE — G0299 HHS/HOSPICE OF RN EA 15 MIN: HCPCS

## 2021-08-24 PROCEDURE — 36600 WITHDRAWAL OF ARTERIAL BLOOD: CPT

## 2021-08-24 PROCEDURE — 99285 EMERGENCY DEPT VISIT HI MDM: CPT

## 2021-08-24 PROCEDURE — 80053 COMPREHEN METABOLIC PANEL: CPT

## 2021-08-24 PROCEDURE — 84484 ASSAY OF TROPONIN QUANT: CPT

## 2021-08-24 PROCEDURE — 85025 COMPLETE CBC W/AUTO DIFF WBC: CPT

## 2021-08-24 PROCEDURE — 71045 X-RAY EXAM CHEST 1 VIEW: CPT

## 2021-08-24 PROCEDURE — 83735 ASSAY OF MAGNESIUM: CPT

## 2021-08-24 PROCEDURE — 82803 BLOOD GASES ANY COMBINATION: CPT

## 2021-08-24 PROCEDURE — 81001 URINALYSIS AUTO W/SCOPE: CPT

## 2021-08-24 PROCEDURE — 74011250636 HC RX REV CODE- 250/636: Performed by: EMERGENCY MEDICINE

## 2021-08-24 RX ADMIN — SODIUM CHLORIDE, SODIUM LACTATE, POTASSIUM CHLORIDE, AND CALCIUM CHLORIDE 1000 ML: 600; 310; 30; 20 INJECTION, SOLUTION INTRAVENOUS at 16:54

## 2021-08-24 NOTE — CASE COMMUNICATION
contacted patient to schedule appointment, caregiver states they are in ED unsure if they will keep him or send him home. SLP to follow up tomorrow 8-.

## 2021-08-24 NOTE — ED NOTES
Pt arrived in wheelchair to Room 11. A/O x 4, c/o stomach pain and back pain. Unable to specify location and unable to describe pain.

## 2021-08-24 NOTE — ED TRIAGE NOTES
Patient reports to er with friend. Friend states his home health nurse wanted him to come in for low blood pressure as well as high heart rate. Patient seen here Friday for abdominal pain and vomiting.  Denies chest pain

## 2021-08-24 NOTE — Clinical Note
Thanks for update Alexandro MELARA. Gasper Wise   ----- Message -----  From: VERONA Nails  Sent: 8/24/2021   7:26 PM EDT  To: Jamie Telles PT      contacted patient to schedule appointment, caregiver states they are in ED unsure if they will keep him or send him home. SLP to follow up tomorrow 8-.

## 2021-08-24 NOTE — HOME HEALTH
Skilled reason for visit: assessment, teaching, vitals    Caregiver involvement: significant other present for visit. Medications reviewed and all medications are available in the home this visit. started flagyl 500 3 x daily and loperamide 2mg 3 times daily- added to med list  The following education was provided regarding medications, medication interactions, and look alike medications (specify): all meds reviewed with patient including purpose of each, how and when to take. Medications  are somewhat effective at this time. Home health supplies by type and quantity ordered/delivered this visit include: NA    Patient education provided this visit: per care plan. Home safety and fall prevention. Infection control, hand washing and hygeine. Importance of med compliance and MD follow up. Reviewed turning and repositioning every 2 hours to prevent skin breakdown, keeping skin clean and dry and promptly removing soiled diapers and linens to protect skin integrity. Using pillows and wedges to prop patient up, floating heels. Nutrition and hydration. Discussed daily weight with pt/cg due to pt failure to thrive, decrease appetite. per cg, refused TH- going to get scale so they can weigh him. Skilled Care Performed this visit: assessment, teaching, vitals BP lower than normal this visit, encouraged adequate hydration and cg to push fluids. Explained loose stools will worsen dehydration. Just started flagyl and loperamide. Discussed vitals with PT staff later in the day and BP was lower, Denel notifying MD.     Agency Progress toward goals: progressing. 9/15 PCP     Patient's Progress towards personal goals: slow progress. Pt reports wanting to get feeding tube placed, encouraged pt/cg to discuss with MD and refer for nutrition or GI to [facility] for this. Home exercise program: per therapy- PT OT ST. Up for meals, stand/walk hourly. HEP for breathing/incontinence exerces provided/reviewed with admit handbook. Using IS every hour while awake.     Continued need for the following skills: Nursing, Physical Therapy, Occupational Therapy and Speech Language Pathology    Plan for next visit: assessment,teaching, vitals, med review    Patient and/or caregiver notified and agrees to changes in the Plan of Care N/A      The following discharge planning was discussed with the pt/caregiver: Pt will be dc when goals met, teaching complete, pt is knowledgable regarding medications and disease process and management

## 2021-08-25 ENCOUNTER — HOME CARE VISIT (OUTPATIENT)
Dept: SCHEDULING | Facility: HOME HEALTH | Age: 86
End: 2021-08-25
Payer: MEDICARE

## 2021-08-25 VITALS
TEMPERATURE: 98.9 F | OXYGEN SATURATION: 97 % | DIASTOLIC BLOOD PRESSURE: 60 MMHG | SYSTOLIC BLOOD PRESSURE: 98 MMHG | RESPIRATION RATE: 18 BRPM | HEART RATE: 115 BPM

## 2021-08-25 VITALS
SYSTOLIC BLOOD PRESSURE: 88 MMHG | TEMPERATURE: 98.4 F | OXYGEN SATURATION: 97 % | HEART RATE: 105 BPM | DIASTOLIC BLOOD PRESSURE: 62 MMHG

## 2021-08-25 PROCEDURE — 3331090001 HH PPS REVENUE CREDIT

## 2021-08-25 PROCEDURE — G0153 HHCP-SVS OF S/L PATH,EA 15MN: HCPCS

## 2021-08-25 PROCEDURE — 3331090002 HH PPS REVENUE DEBIT

## 2021-08-25 NOTE — HOME HEALTH
SUBJECTIVE: the pt seen lying in the bed upon today's arrival. \"it's not a good day. \" The pts CG states that the pt is not eating and verbalizes wanting a feeding tube. pt states it has been 2-3 days since he has eaten anything. Bruce Garrett PAIN: see pain assessment. Bruce DONALDSON MD APPT: TBD  . CAREGIVER ASSISTANCE NEEDED FOR: ADLs, IADLs, gait and transfers - safety. Bruce Garrett HEP consisting of: APs, deep breathing and positional changes every 30-60 mins. .  ASSESSMENT AND PROGRESS TOWARD GOALS:  minimal participation on this date due to lack of nutrition for strength with c/o stomach pain limiting tolerance to activity and decreased BP with increased HR. Telephone to MD with Dr. Dianna Bailey suggesting the ER to address possible dehydration as well as the pts desire for a feeding tube. The pts CG stated that she would be speaking to the pts son and deciding on the ER visit later today. The pt will benefit from continued HHPT for increasing strength to improve safety with functional mobility and reduce the risks of falls and overall burden of care. .  CONTINUED NEED FOR THE FOLLOWING SKILLS: HH PT is medically necessary to address pain, decreased ROM, decreased strength,  impaired bed mobility, decreased independence with functional transfers, impaired gait, impaired stair negotiation, and impaired balance in order to improve functional independence, quality of life, return to PLOF, reduce the risk for falls, and reduce pain. [address] LAST COMPLETED ON:  Nikhil Polanco RN and Dr. Bustamante  8/24/21. Bruce Garrett PLAN: Will plan to address transfers and safety with functional mobility next visit. DISCHARGE PLANNING DISCUSSED: Discharge to self and family under MD supervision once all goals have been met or patient has reached maximum potential. Discussed with the pt and the CG, Rachell, the POC of 1w1, 2w3 with 1 visit this week and 2w2 remaining. Both the pt and the CG verbalize understanding.

## 2021-08-25 NOTE — ED PROVIDER NOTES
EMERGENCY DEPARTMENT HISTORY AND PHYSICAL EXAM      Date: 8/24/2021  Patient Name: Rikki Springer    History of Presenting Illness     Chief Complaint   Patient presents with    Hypotension       History (Context): Rikki Springer is a 80 y.o. ***who presents with generalized weakness that is gradual in onset, progressive, severe, constant and without exacerbating/relieving features or other associated symptoms. On review of systems, the patient denies fever, chills, rashes, headache, chest pain, abdominal pain, nausea, vomiting, diarrhea, dysuria, hematuria, bleeding, drug use, recent changes to medications, jaundice, recent travel. PCP: Jagjit Dodson MD    Current Outpatient Medications   Medication Sig Dispense Refill    acetaminophen (TYLENOL) 500 mg tablet Take 500 mg by mouth every six (6) hours as needed for Pain.  megestroL (MEGACE) 400 mg/10 mL (10 mL) suspension Take 400 mg by mouth daily.  gabapentin (NEURONTIN) 300 mg capsule Take 300 mg by mouth three (3) times daily. by mouth      polyethylene glycol (MIRALAX) 17 gram packet Take 1 Packet by mouth daily as needed for Constipation. 15 Packet 0    therapeutic multivitamin (THERAGRAN) tablet Take 1 Tablet by mouth daily. 30 Tablet 0    bisacodyl (DULCOLAX) 5 mg EC tablet Take 1 Tab by mouth daily as needed for Constipation. 25 Tab 2    timolol (TIMOPTIC) 0.5 % ophthalmic solution Administer 1 Drop to both eyes two (2) times a day.          Past History     Past Medical History:  Past Medical History:   Diagnosis Date    Arthritis     Back pain, chronic        Past Surgical History:  Past Surgical History:   Procedure Laterality Date    HX HEENT      relieved pressure R eye 1/2014       Family History:  Family History   Problem Relation Age of Onset    Diabetes Father     No Known Problems Mother        Social History:  Social History     Tobacco Use    Smoking status: Never Smoker    Smokeless tobacco: Never Used Substance Use Topics    Alcohol use: No     Alcohol/week: 0.8 standard drinks     Types: 1 Standard drinks or equivalent per week    Drug use: No       Allergies:  No Known Allergies    PMH, PSH, family history, social history, allergies reviewed with the patient with significant items noted above. Review of Systems   As per HPI, otherwise reviewed and negative. Physical Exam     Vitals:    08/24/21 1830 08/24/21 1845 08/24/21 1900 08/24/21 1915   BP: 109/65 106/71 112/67 113/66   Pulse: 87 88 89 88   Resp: 19 15 15 18   Temp:       SpO2: 100% 100% 100% 100%       Gen: Well-appearing, in no acute distress ***  HEENT: Normocephalic, sclera anicteric  Cardiovascular: ***Normal rate, regular rhythm, no murmurs, rubs, gallops. Pulses intact and equal distally. Pulmonary: No respiratory distress. No stridor. Clear lungs. ***  ABD: Soft, nontender, nondistended. Neuro: Alert. Normal speech. Normal mentation. PERRLA. Cranial nerves II through XII intact. Strength:***. Normal sensation throughout. Psych: Normal thought content and thought processes. : No CVA tenderness  EXT: Moves all extremities well. No cyanosis or clubbing. Skin: Warm and well-perfused.           Diagnostic Study Results     Labs -     Recent Results (from the past 12 hour(s))   EKG, 12 LEAD, INITIAL    Collection Time: 08/24/21  3:22 PM   Result Value Ref Range    Ventricular Rate 133 BPM    Atrial Rate 133 BPM    P-R Interval 130 ms    QRS Duration 106 ms    Q-T Interval 372 ms    QTC Calculation (Bezet) 553 ms    Calculated P Axis 81 degrees    Calculated R Axis 67 degrees    Calculated T Axis 72 degrees    Diagnosis       Sinus tachycardia  Incomplete right bundle branch block  Prolonged QT  When compared with ECG of 13-AUG-2021 16:17,  Left anterior fascicular block is no longer present  Confirmed by Brie Bush MD, Felipa Leo ([id number]) on 8/24/2021 4:00:05 PM     CBC WITH AUTOMATED DIFF    Collection Time: 08/24/21  3:33 PM   Result Value Ref Range    WBC 11.0 4.6 - 13.2 K/uL    RBC 3.72 (L) 4.35 - 5.65 M/uL    HGB 10.1 (L) 13.0 - 16.0 g/dL    HCT 31.0 (L) 36.0 - 48.0 %    MCV 83.3 78.0 - 100.0 FL    MCH 27.2 24.0 - 34.0 PG    MCHC 32.6 31.0 - 37.0 g/dL    RDW 14.1 11.6 - 14.5 %    PLATELET 240 190 - 609 K/uL    MPV 11.2 9.2 - 11.8 FL    NEUTROPHILS 84 (H) 40 - 73 %    LYMPHOCYTES 10 (L) 21 - 52 %    MONOCYTES 3 3 - 10 %    EOSINOPHILS 3 0 - 5 %    BASOPHILS 0 0 - 2 %    ABS. NEUTROPHILS 9.3 (H) 1.8 - 8.0 K/UL    ABS. LYMPHOCYTES 1.1 0.9 - 3.6 K/UL    ABS. MONOCYTES 0.3 0.05 - 1.2 K/UL    ABS. EOSINOPHILS 0.3 0.0 - 0.4 K/UL    ABS. BASOPHILS 0.0 0.0 - 0.1 K/UL    DF MANUAL      PLATELET COMMENTS ADEQUATE PLATELETS      RBC COMMENTS NORMOCYTIC, NORMOCHROMIC     METABOLIC PANEL, COMPREHENSIVE    Collection Time: 08/24/21  3:33 PM   Result Value Ref Range    Sodium 140 136 - 145 mmol/L    Potassium 4.1 3.5 - 5.5 mmol/L    Chloride 113 (H) 100 - 111 mmol/L    CO2 16 (L) 21 - 32 mmol/L    Anion gap 11 3.0 - 18 mmol/L    Glucose 98 74 - 99 mg/dL    BUN 43 (H) 7.0 - 18 MG/DL    Creatinine 2.49 (H) 0.6 - 1.3 MG/DL    BUN/Creatinine ratio 17 12 - 20      GFR est AA 29 (L) >60 ml/min/1.73m2    GFR est non-AA 24 (L) >60 ml/min/1.73m2    Calcium 7.7 (L) 8.5 - 10.1 MG/DL    Bilirubin, total 0.5 0.2 - 1.0 MG/DL    ALT (SGPT) 15 (L) 16 - 61 U/L    AST (SGOT) 28 10 - 38 U/L    Alk.  phosphatase 144 (H) 45 - 117 U/L    Protein, total 6.2 (L) 6.4 - 8.2 g/dL    Albumin 2.5 (L) 3.4 - 5.0 g/dL    Globulin 3.7 2.0 - 4.0 g/dL    A-G Ratio 0.7 (L) 0.8 - 1.7     LIPASE    Collection Time: 08/24/21  3:33 PM   Result Value Ref Range    Lipase 104 73 - 393 U/L   TROPONIN I    Collection Time: 08/24/21  3:33 PM   Result Value Ref Range    Troponin-I, QT 0.07 (H) 0.0 - 0.045 NG/ML   MAGNESIUM    Collection Time: 08/24/21  3:33 PM   Result Value Ref Range    Magnesium 2.5 1.6 - 2.6 mg/dL   TYPE & SCREEN    Collection Time: 08/24/21  4:02 PM   Result Value Ref Range Crossmatch Expiration 08/27/2021,2359     ABO/Rh(D) AB POSITIVE     Antibody screen NEG    BLOOD GAS, ARTERIAL POC    Collection Time: 08/24/21  5:19 PM   Result Value Ref Range    Device: ROOM AIR      pH (POC) 7.49 (H) 7.35 - 7.45      pCO2 (POC) 24.6 (L) 35.0 - 45.0 MMHG    pO2 (POC) 119 (H) 80 - 100 MMHG    HCO3 (POC) 19.0 (L) 22 - 26 MMOL/L    sO2 (POC) 99.1 (H) 92 - 97 %    Base deficit (POC) 3.6 mmol/L    Allens test (POC) Positive      Site RIGHT RADIAL      Patient temp. 97.2      Specimen type (POC) ARTERIAL      Performed by Mendez Ahr        Radiologic Studies -   XR CHEST PORT    (Results Pending)     CT Results  (Last 48 hours)    None        CXR Results  (Last 48 hours)    None            Medical Decision Making   I am the first provider for this patient. I reviewed the vital signs, available nursing notes, past medical history, past surgical history, family history and social history. Vital Signs-Reviewed the patient's vital signs. EKG: Interpreted by myself. ***     Records Reviewed: Personally, on initial evaluation    MDM:   Patient presents with generalized weakness. Exam significant for ***. DDX considered: The differential is broad but includes toxic, metabolic, infectious, primary neurologic, endocrine, functional etiologies. We will need to perform a broad diagnostic work-up.     Patient condition on initial evaluation: Severely ill    Plan:   Close Observation  Cardiac monitoring    Orders as below:  Orders Placed This Encounter    XR CHEST PORT    CBC WITH AUTOMATED DIFF    METABOLIC PANEL, COMPREHENSIVE    LIPASE    Troponin I    URINALYSIS W/ RFLX MICROSCOPIC    MAGNESIUM    BLOOD GAS, ARTERIAL    DIET NPO    CARDIAC MONITORING    BLOOD GAS, ARTERIAL POC    EKG, 12 LEAD, INITIAL    TYPE & SCREEN    INSERT PERIPHERAL IV ONE TIME STAT    lactated ringers bolus infusion 1,000 mL        ED Course:          Patient condition at time of disposition: ***  DISCHARGE NOTE:   Pt has been reexamined. *** Patient has no new complaints, changes, or physical findings. Care plan outlined and precautions discussed. Results were reviewed with the patient. All medications were reviewed with the patient; will d/c home with ***. All of pt's questions and concerns were addressed. Alarm symptoms and return precautions associated with chief complaint and evaluation were reviewed with the patient in detail. The patient demonstrated adequate understanding. Patient was instructed and agrees to follow up with ***, as well as to return to the ED upon further deterioration. Patient is ready to go home. The patient is ***happy with this plan    Follow-up Information    None         Current Discharge Medication List          Procedures:  Procedures      Critical Care Time:     Disposition: ***    Diagnosis     Clinical Impression: No diagnosis found. Ruthann Armenta MD  Emergency Physician  [facility]    As a voice dictation software was utilized to dictate this note, minor word transpositions can occur. I apologize for confusing wording and typographic errors. Please feel free to contact me for clarification.

## 2021-08-26 ENCOUNTER — HOME CARE VISIT (OUTPATIENT)
Dept: SCHEDULING | Facility: HOME HEALTH | Age: 86
End: 2021-08-26
Payer: MEDICARE

## 2021-08-26 VITALS
DIASTOLIC BLOOD PRESSURE: 54 MMHG | OXYGEN SATURATION: 96 % | HEART RATE: 88 BPM | SYSTOLIC BLOOD PRESSURE: 96 MMHG | TEMPERATURE: 98.2 F

## 2021-08-26 VITALS
OXYGEN SATURATION: 98 % | SYSTOLIC BLOOD PRESSURE: 101 MMHG | HEART RATE: 85 BPM | DIASTOLIC BLOOD PRESSURE: 67 MMHG | RESPIRATION RATE: 18 BRPM | TEMPERATURE: 98.2 F

## 2021-08-26 VITALS
SYSTOLIC BLOOD PRESSURE: 94 MMHG | HEART RATE: 84 BPM | DIASTOLIC BLOOD PRESSURE: 66 MMHG | OXYGEN SATURATION: 98 % | RESPIRATION RATE: 18 BRPM | TEMPERATURE: 97.6 F

## 2021-08-26 PROCEDURE — 3331090001 HH PPS REVENUE CREDIT

## 2021-08-26 PROCEDURE — G0153 HHCP-SVS OF S/L PATH,EA 15MN: HCPCS

## 2021-08-26 PROCEDURE — 3331090002 HH PPS REVENUE DEBIT

## 2021-08-26 PROCEDURE — G0157 HHC PT ASSISTANT EA 15: HCPCS

## 2021-08-26 NOTE — HOME HEALTH
SUBJECTIVE: pt seen lying in the bed upont today's arrival. The CG states that the pt only gets OOB fo ruse of the bathroom. PAIN: see pain assessment. NEXT MD APPT: TBD    CAREGIVER ASSISTANCE NEEDED FOR: ADLs, IADLs, gait and transfers - safety. HEP consisting of: APs, deep breathing and positional changes every 30-60 mins. ASSESSMENT AND PROGRESS TOWARD GOALS: The pt presents with self limiting behaviors and decreased desire for participation on this date as indicated by limiting ther exs, not open to suggestions for use of the cane with ambulation, limiting ambulation distance as well as reporting pain. The pt will benefit from continued HHPT to increase strength and improve balance and endurance to reduce the risks of falls as well as overall burden of care to improve the pts safety with functional mobility. CONTINUED NEED FOR THE FOLLOWING SKILLS: HH PT is medically necessary to address pain, decreased ROM, decreased strength,  impaired bed mobility, decreased independence with functional transfers, impaired gait, impaired stair negotiation, and impaired balance in order to improve functional independence, quality of life, return to PLOF, reduce the risk for falls, and reduce pain. [address] LAST COMPLETED ON:  Adria Winters, PT 8/25/21. PLAN: plan to address the stairs for exitng the home, next visit. DISCHARGE PLANNING DISCUSSED: Discharge to self and family under MD supervision once all goals have been met or patient has reached maximum potential. Discussed with the pt and the CG, Rachell, the POC of 1w1, 2w3 with 2w2 remaining. Both the pt and the CG verbalize understanding.

## 2021-08-26 NOTE — HOME HEALTH
[facility]  RECENT HX OF CURRENT ILLNESS/REASON FOR REFERRAL:  Patient referred to [facility]  speech therapy to assess swallow function following F2F encounter with Dr Roula Zelaya on 8-. Juan Mina PLOF/DIET/COMMUNICATION:  Poor PO intake, history of PEG tube several years ago  . PAST MEDICAL HISTORY:  arthritis, back pain  . CURRENT RESIDENCE/LIVING SITUATION: Patient lives in single story with friend who provides assistance with medications, meals and ADLs  . EDUCATIONAL LEVEL:     ___[employer]  . SUBJECTIVE (PT/FAMILY COMMENTS, THERAPIST OBSERVATIONS): Patient's friend is present assisting with history and meal preparation. Patient states I can't swallow, I'd rather have a feeding tube\"  . MEDICATIONS REVIEWED:  NO PROBLEMS   . CAREGIVER INVOLVEMENT:caregivers provide assistance with medications, meals and ADLs  . ASSESSMENT OF INTERVENTION / PATIENT PROGRESS / [place] / PLAN:  Patient received swallow evaluation utilizing the Swallow Ability and Function Examination which is remarkable for moderate oral pharyngeal dysphagia characterized by multiple swallows to clear bolus pharyngeally on small bolus, poor mastication with mechanical soft items, suspect esophageal dysphagia, consider silent aspiration due to laryngeal and esophageal dysphagia. Recommend Full liquid diet with strict aspiration precautions for QOL and alternative conduit for primary nutrition hydration, GI consult and MBS to further assess swallow function. .  MD NOTIFIED OF POC AND FREQUENCY  contacted Dr Carmelita El who will order MBS and referral to GI Dr  .  PATIENT GOALS: swallow  . ASSESSMENT/RESPONSE TO TEACHING:  Patient states he wants a feeding tube  . HOME EXERCISE PROGRAM: strategies to increase safe PO nutrition  . EDUCATION PROVIDED:  STPOC  .   DISCHARGE PLANNING - (ANTICIPATED DC DATE, DC PLAN): 3 more visits or when goals met max benefits achieved

## 2021-08-27 PROCEDURE — 3331090002 HH PPS REVENUE DEBIT

## 2021-08-27 PROCEDURE — 3331090001 HH PPS REVENUE CREDIT

## 2021-08-27 NOTE — HOME HEALTH
SUBJECTIVE  Caregiver states - he drank and ensure this morning, patient states \"I need that tube'. .  Caregiver involvement: caregiver provide assistance with medications, meals and ADLs. .  Medications reconciled and all medications are not available in the home this visit. The following education was provided regarding medications, medication interactions, and look a like medications: no new medications, continue to crush, take as scheduled. Medications  are effective at this time. .  Home health supplies by type and quantity ordered/delivered this visit include: n/a  . Patient education provided this visit:  referrals to [facility] [address] follow up with Dr Kamilah Koenig for tube placement  . ASSESSMENT  Patient demonstrates progress in therapy as indicated by compliance with safe swallow strategies with assistance from SLP, patient caregiver are not compliant with diet recommendation as patient has lunch meat sandwich and cup where bolus had been expectorated at bedside upon SLP arrival.  Speech therapy is medically necessary to increase safe PO nutrition on least restrictive diet with recommendation for alternative conduit for primary nutrition/hydration, thin liquids or QOL. Yessy Charles   PLAN  Home exercise program:   written safe swallow strategies provided, approved PO intake    Continued need for the following skills: Speech Language Pathology    The following discharge planning was discussed with the pt/caregiver: 2 more visits or when goals met/max benefits

## 2021-08-28 PROCEDURE — 3331090002 HH PPS REVENUE DEBIT

## 2021-08-28 PROCEDURE — 3331090001 HH PPS REVENUE CREDIT

## 2021-08-29 PROCEDURE — 3331090002 HH PPS REVENUE DEBIT

## 2021-08-29 PROCEDURE — 3331090001 HH PPS REVENUE CREDIT

## 2021-08-30 ENCOUNTER — HOME CARE VISIT (OUTPATIENT)
Dept: SCHEDULING | Facility: HOME HEALTH | Age: 86
End: 2021-08-30
Payer: MEDICARE

## 2021-08-30 PROCEDURE — G0157 HHC PT ASSISTANT EA 15: HCPCS

## 2021-08-30 PROCEDURE — 3331090001 HH PPS REVENUE CREDIT

## 2021-08-30 PROCEDURE — 3331090002 HH PPS REVENUE DEBIT

## 2021-08-31 ENCOUNTER — HOME CARE VISIT (OUTPATIENT)
Dept: SCHEDULING | Facility: HOME HEALTH | Age: 86
End: 2021-08-31
Payer: MEDICARE

## 2021-08-31 ENCOUNTER — TRANSCRIBE ORDER (OUTPATIENT)
Dept: SCHEDULING | Age: 86
End: 2021-08-31

## 2021-08-31 VITALS
TEMPERATURE: 98.2 F | SYSTOLIC BLOOD PRESSURE: 108 MMHG | DIASTOLIC BLOOD PRESSURE: 60 MMHG | OXYGEN SATURATION: 98 % | HEART RATE: 95 BPM

## 2021-08-31 VITALS — WEIGHT: 106 LBS | BODY MASS INDEX: 13.99 KG/M2

## 2021-08-31 DIAGNOSIS — R13.0 APHAGIA: Primary | ICD-10-CM

## 2021-08-31 PROCEDURE — 3331090002 HH PPS REVENUE DEBIT

## 2021-08-31 PROCEDURE — 3331090001 HH PPS REVENUE CREDIT

## 2021-08-31 PROCEDURE — G0299 HHS/HOSPICE OF RN EA 15 MIN: HCPCS

## 2021-08-31 NOTE — HOME HEALTH
SUBJECTIVE: The pts CG continues to report that the pt is not getting OOB except to use the bathroom. The pt and CG highly encouraged for pt to sit in the living room or over the EOB houly. PAIN: see pain assessment. NEXT MD APPT: MARTHA    CAREGIVER ASSISTANCE NEEDED FOR: ADLs, IADLs, gait and transfers - safety. HEP consisting of: APs, deep breathing and positional changes every 30-60 mins. ASSESSMENT AND PROGRESS TOWARD GOALS: The pt continues to be limited by stomach pain and back pain. The pt does not carry over cues for corrections and thus far has not complied with any suggestions for increasing activity. The pt reports a 5/10 RPE post activity on this date. The pt would highly benefit from suggstions for getting OOB more throughout the day and for positional changes to reduce the risks of skin breakdown and improve his overall quality of life. The pt does have difficulty with tolerance to activity associated with his stomach and back pain which appear to be the limiting factors at this time. CONTINUED NEED FOR THE FOLLOWING SKILLS: HH PT is medically necessary to address pain, decreased ROM, decreased strength, decreased independence with functional transfers, impaired gait, impaired stair negotiation, and impaired balance in order to improve functional independence, quality of life, return to PLOF, reduce the risk for falls, and reduce pain. [address] LAST COMPLETED ON:  Khoa Choi, PT 8/25/21. PLAN: Tinetti Balance test, review of ther exs for strengthening and pressure relief. DISCHARGE PLANNING DISCUSSED: Discharge to self and family under MD supervision once all goals have been met or patient has reached maximum potential. Discussed with the pt and the CG, Rachell, the POC of 1w1, 2w3 with 1 visit this week then 2w1 remaining. Both the pt and the CG verbalize understanding.

## 2021-09-01 ENCOUNTER — HOME CARE VISIT (OUTPATIENT)
Dept: SCHEDULING | Facility: HOME HEALTH | Age: 86
End: 2021-09-01
Payer: MEDICARE

## 2021-09-01 VITALS
SYSTOLIC BLOOD PRESSURE: 106 MMHG | HEART RATE: 100 BPM | DIASTOLIC BLOOD PRESSURE: 65 MMHG | TEMPERATURE: 98.2 F | OXYGEN SATURATION: 99 %

## 2021-09-01 VITALS
TEMPERATURE: 98.2 F | OXYGEN SATURATION: 98 % | HEART RATE: 103 BPM | DIASTOLIC BLOOD PRESSURE: 64 MMHG | SYSTOLIC BLOOD PRESSURE: 106 MMHG

## 2021-09-01 PROCEDURE — G0157 HHC PT ASSISTANT EA 15: HCPCS

## 2021-09-01 PROCEDURE — 3331090001 HH PPS REVENUE CREDIT

## 2021-09-01 PROCEDURE — 3331090002 HH PPS REVENUE DEBIT

## 2021-09-01 PROCEDURE — G0153 HHCP-SVS OF S/L PATH,EA 15MN: HCPCS

## 2021-09-01 NOTE — HOME HEALTH
SUBJECTIVE  Caregiver reports patient has consumed one ensure, approximately 6 ounces of beef broth and water today. Reports patient has not attempted more complex solids in last 4 days. Caregiver Rachell contacted SLP to reports hospital called to schedule MBS however, she missed their call. .  Caregiver involvement: caregivers provide assistance with medications, meals and ADLs. .  Medications reconciled and all medications are not available in the home this visit. The following education was provided regarding medications, medication interactions, and look a like medications: no new medications, take as scheduled. Medications  are somewhat effective at this time. .  Home health supplies by type and quantity ordered/delivered this visit include: n/a  . Patient education provided this visit:  monitory weight and PO intake, keep scheduled MD appointments, SLP contacted Dr Minerva Lopez office to follow up on recommendation for PEG tube placement and to report heart rate is at max level of range at 100 BPM, spoke with Anton who is following up on PEG tube recommendation and will return SLP phone call. .  OBJECTIVE- see interventions  . ASSESSMENT  Patient demonstrates limited participation in therapy this day as indicated by limited PO intake and increased encouragement to utilize safe swallow strategies. Speech therapy is medically necessary to increase safe PO intake and advance diet as tolerated. Recommend alternative conduit for primary nutrition hydration and MBS to assess swallow function. Bruce Garrett PLAN  Home exercise program: safe swallow strategies, offer PO intake frequently throughout day.     Continued need for the following skills: Speech Language Pathology    The following discharge planning was discussed with the pt/caregiver: 1 more visit or when goals met/max benefits achieved

## 2021-09-01 NOTE — HOME HEALTH
SUBJECTIVE:  The pt lying in the bed upon today's arrival. The pt continues to c/o weakness that is not allowing him to participate in activities. The CG states the pt is not getting OOB except to use the restroom. They continue to wait to hear from gastro for feeding tube. PAIN: see pain assessment. NEXT MD APPT: TBMICHELLE    CAREGIVER ASSISTANCE NEEDED FOR: ADLs, IADLs, gait and transfers - safety. HEP consisting of: APs, deep breathing and positional changes every 30-60 mins. ASSESSMENT AND PROGRESS TOWARD GOALS: The pt continues to have difficulty with tolerance to participation with additional difficulty noted on this date as he stated he felt too weak to stand to perform the Tinetti Balance test. Per verbal reports the pt is getting very little nutirition and continues to report back and stomach pain limiting his participation tolerance. The pt may not be appropriate for HHPT for strengthening at this time, but will benefit from pain management and CG education for reducing skin breakdown and assisting of pain management. CONTINUED NEED FOR THE FOLLOWING SKILLS: HH PT is medically necessary to address pain, decreased ROM, decreased strength, decreased independence with functional transfers, impaired gait, impaired stair negotiation, and impaired balance in order to improve functional independence, quality of life, return to PLOF, reduce the risk for falls, and reduce pain. [address] LAST COMPLETED ON:  Lisa Ayala, PT 9/1/21 - left voicemail. PLAN:  Will plan to reassess pt for the ability to participate and continue to educate on management of pain with the anticipation of [address] next week. DISCHARGE PLANNING DISCUSSED: Discharge to self and family under MD supervision once all goals have been met or patient has reached maximum potential. Discussed with the pt and the CG, Rachell, the POC of 1w1, 2w3 with 2w1 remaining.  Both the pt and the CG verbalize understanding.

## 2021-09-01 NOTE — HOME HEALTH
Skilled reason for visit:full system assessment, medication management,educate on nutrition     Caregiver involvement: Karin Apgar assist with all activity. Medications reviewed and all medications ARE available in the home this visit. The following education was provided regarding medications, medication interactions, and look alike medications (specify): Instructed caregiver on Megace to increase appetite. Medications  are effective at this time. Home health supplies by type and quantity ordered/delivered this visit include: n/a    Patient education provided this visit: Caregiver instructed on aspiration precautions: eating while sitting up, maintaining thick liquid diet, monitoring for s/s aspiration including sob, pleuritic pain,  Sn instructed patient to use moisturizes and increase hydration to prevent dry skin and dehydration. sn instructed patient on alternate pain relief measures including heating pad use, set on medium and to apply for no more than 20 minutes at a time. Skilled Care Performed this visit: Assessment, medication review, education on current medications, education on dry skin and hydration importance. Patient's Progress towards personal goals: Progress is slowed due to patients current mental state and weakness. Home exercise program: Deep breathing exercises, frequent position changes for pressure relief. Continued need for the following skills: Nursing, PT and speech    Plan for next visit: Follow up on nutrition and hydration status and patient compliance.     Patient and/or caregiver notified and agrees to changes in the Plan of Care N/A      The following discharge planning was discussed with the pt/caregiver: Discharge when Kerry Whittington is independent with medication management.:

## 2021-09-02 ENCOUNTER — HOSPITAL ENCOUNTER (OUTPATIENT)
Dept: GENERAL RADIOLOGY | Age: 86
Discharge: HOME | End: 2021-09-02
Attending: INTERNAL MEDICINE
Payer: MEDICARE

## 2021-09-02 DIAGNOSIS — R13.0 APHAGIA: ICD-10-CM

## 2021-09-02 PROCEDURE — 3331090002 HH PPS REVENUE DEBIT

## 2021-09-02 PROCEDURE — 3331090001 HH PPS REVENUE CREDIT

## 2021-09-02 PROCEDURE — 74230 X-RAY XM SWLNG FUNCJ C+: CPT

## 2021-09-02 PROCEDURE — 74011000250 HC RX REV CODE- 250

## 2021-09-02 PROCEDURE — 92611 MOTION FLUOROSCOPY/SWALLOW: CPT

## 2021-09-02 RX ADMIN — BARIUM SULFATE 15 ML: 400 PASTE ORAL at 13:11

## 2021-09-02 RX ADMIN — BARIUM SULFATE 45 G: 960 POWDER, FOR SUSPENSION ORAL at 13:12

## 2021-09-02 RX ADMIN — BARIUM SULFATE 60 ML: 400 SUSPENSION ORAL at 13:12

## 2021-09-02 NOTE — PROGRESS NOTES
[address] [facility]  SPEECH LANGUAGE PATHOLOGY OUTPATIENT MODIFIED BARIUM SWALLOW STUDY    Patient: Juan Carlos Canas (18 y.o. male)  Date: 9/2/2021  Primary Diagnosis: Aphagia [R13.0]    Precautions: Aspiration       Assessment:  Caregiver reporting pt \"not eating\", \"wants a feeding tube\" and \"looks like he hurts when he drinks water\". Based on the objective data described below, the patient presents with mod oropharyngeal dysphagia characterized by aspiration with cough with thin liquids. Pt able to tolerate NTL and pudding with no airway compromise noted across multiple trials. Per caregiver, pt not tolerating solids; therefore, no solids presented. Deficits include delayed a-p transit, swallow delay with decreased laryngeal elevation/adduction/sensation and slowed epiglottic inversion. Pt also demo decreased pharyngeal strength/motility resulting in mild vallecular residuals across intake. Recommend puree diet with NTL with meds crushed in applesauce or pudding. Results/recommendations discussed with pt and caregiver. Caregiver concerned regarding nutritional status, encouraged to follow up with home health SLP and GI. Written handout provided regarding diet recs, thickening agent information and compensatory swallowing strategies. Recommendations:   Puree diet with nectar thick liquids  Meds crushed in puree   Aspiration precautions  HOB >45 during po intake, remain >30 for 30-45 minutes after po   Small bites/sips; alternate liquid/solid with slow feeding rate   Oral care TID  Follow up with home health   GI consult-caregiver reporting pt/family want PEG tube due to poor intake      SUBJECTIVE:   Patient stated What's that?     OBJECTIVE:     Past Medical History:   Diagnosis Date    Arthritis     Back pain, chronic      Past Surgical History:   Procedure Laterality Date    HX HEENT      relieved pressure R eye 1/2014      Current Diet: caregiver reports minimal intake; attempted to soften food but that has not helped   Radiology:  Film Views: Lateral;Fluoro  Patient Position: 90 in chair    Trial 1: Trial 2:   Consistency Presented: Thin liquid Consistency Presented: Nectar thick liquid;Pudding   How Presented: Self-fed/presented;Cup/sip;Spoon;Straw;Successive swallows How Presented: SLP-fed/presented;Cup/sip; Self-fed/presented;Spoon;Straw;Successive swallows   Bolus Acceptance: No impairment Bolus Acceptance: No impairment   Bolus Formation/Control: No impairment:   Bolus Formation/Control: Impaired: Delayed;Poor;Posterior   Propulsion: Delayed (# of seconds) Propulsion: Discoordination;Delayed (# of seconds)     Oral Residue: Lingual   Initiation of Swallow: No impairment     Timing: No impairment Timing: No impairment     Penetration: None   Aspiration/Timing: From initial swallow Aspiration/Timing: No evidence of aspiration   Pharyngeal Clearance: Pyriform residue ;Vallecular residue; Less than 10% Pharyngeal Clearance: Vallecular residue; Less than 10%; Pyriform residue    Attempted Modifications: Small sips and bites; Spoon     Effective Modifications: None     Cues for Modifications: Moderate         Decreased Tongue Base Retraction?: Yes  Laryngeal Elevation: Inadequate epiglottic inversion; Incomplete laryngeal closure  Aspiration/Penetration Score: 7 (Aspiration-Contrast passes below the cords/, but is not ejected despite attempt)  Pharyngeal Symmetry: Not assessed  Pharyngeal-Esophageal Segment: Decreased relaxation of upper esophageal segment  Pharyngeal Dysfunction: Decreased tongue base retraction;Decreased strength;Decreased elevation/closure;Crico-pharyngeal dysfunction  Oral Phase Severity: Moderate  Pharyngeal Phase Severity: Moderate    8-point Penetration-Aspiration Scale: Score 7    PAIN:  Pt reports 0/10 pain or discomfort prior to MBS. Pt reports 0/10 pain or discomfort post MBS.      COMMUNICATION/EDUCATION:   [x]  Education provided post diagnostic testing including oropharyngeal anatomy/physiology, MBS results, diet recommendations and        compensatory strategies/positioning. [x]  Video feedback utilized. [x]  Handout regarding diet recommendations and thickener instructions provided. [x]  Patient/family have participated as able in goal setting and plan of care. []  Patient/family agree to work toward stated goals and plan of care. []  Patient understands intent and goals of therapy, but is neutral about his/her participation. []  Patient is unable to participate in goal setting and plan of care.     Thank you for this referral,  Trav Barone M.S., [address] [facility]  Speech-Language Pathologist

## 2021-09-03 PROCEDURE — 3331090002 HH PPS REVENUE DEBIT

## 2021-09-03 PROCEDURE — 3331090001 HH PPS REVENUE CREDIT

## 2021-09-04 PROCEDURE — 3331090001 HH PPS REVENUE CREDIT

## 2021-09-04 PROCEDURE — 3331090002 HH PPS REVENUE DEBIT

## 2021-09-05 PROCEDURE — 3331090001 HH PPS REVENUE CREDIT

## 2021-09-05 PROCEDURE — 3331090002 HH PPS REVENUE DEBIT

## 2021-09-06 PROCEDURE — 3331090001 HH PPS REVENUE CREDIT

## 2021-09-06 PROCEDURE — 3331090002 HH PPS REVENUE DEBIT

## 2021-09-07 ENCOUNTER — HOME CARE VISIT (OUTPATIENT)
Dept: SCHEDULING | Facility: HOME HEALTH | Age: 86
End: 2021-09-07
Payer: MEDICARE

## 2021-09-07 VITALS
DIASTOLIC BLOOD PRESSURE: 52 MMHG | HEART RATE: 86 BPM | OXYGEN SATURATION: 98 % | SYSTOLIC BLOOD PRESSURE: 76 MMHG | TEMPERATURE: 98.3 F | RESPIRATION RATE: 16 BRPM

## 2021-09-07 PROCEDURE — G0153 HHCP-SVS OF S/L PATH,EA 15MN: HCPCS

## 2021-09-07 PROCEDURE — G0151 HHCP-SERV OF PT,EA 15 MIN: HCPCS

## 2021-09-07 PROCEDURE — 3331090002 HH PPS REVENUE DEBIT

## 2021-09-07 PROCEDURE — G0299 HHS/HOSPICE OF RN EA 15 MIN: HCPCS

## 2021-09-07 PROCEDURE — 3331090001 HH PPS REVENUE CREDIT

## 2021-09-07 NOTE — CASE COMMUNICATION
Thanks girls for the heads up today prior to coming over. Here is my DC summary. DISCHARGE SUMMARY OF CARE  Patient is s/p chronic kidney disease/JUILET,  and has been treated for BLE ROM, strengthening, gait training,  HEP training, safety training, and balance training as much as pt has been able to tolerate. Pt has had minimal participation with PT due to failure to thrive, weakness, and poor tolerance. After speaking with ANTONIO, Amy Sanon and the rest of the rehab team it has been best to DC pt at this time until he is better able to participate. PT arrived today for DC and nurse Courtney Glass and SLP Yesenia Ocasio were just finishing their visits but had recommended to pt's life partner Nelsy Donis to take him to ER due to low BP, weakness, and possible dehydration. Nelsy Jewels has agreed and pt to go to ER with Nelsy Donis today. Pt is insisting that he wants a feeding tube and has GI appt on Thursday if not admitted to hospital.  Nelsy Donis has signed [facility] form due to PT DC one visit early due to the above. PT advised Nelsy Donis that if pt gets tube or overall ability to tolerate PT improves then she can request order from MD again for Home PT. Nelsy Donis has requested rollator for pt and PT has faxed over orders. Discharge plan: Discharge from Home PT services as goals have been unabel to be met due to the above. At this time, pt is still seeing SN with plan to DC next week and SLP DC today as well. Thanks Mechelle Patten.

## 2021-09-08 VITALS
OXYGEN SATURATION: 98 % | SYSTOLIC BLOOD PRESSURE: 124 MMHG | DIASTOLIC BLOOD PRESSURE: 83 MMHG | HEART RATE: 64 BPM | TEMPERATURE: 97.4 F | RESPIRATION RATE: 18 BRPM

## 2021-09-08 PROCEDURE — 3331090001 HH PPS REVENUE CREDIT

## 2021-09-08 PROCEDURE — 3331090002 HH PPS REVENUE DEBIT

## 2021-09-08 NOTE — HOME HEALTH
SUBJECTIVE  Caregiver states - he hasn't been eating anything, he doesn't want his medicine, he says it hurts his stomach. .  Caregiver involvement: caregivers provide assistance with medications, meals and ADLs. .  Medications reconciled and all medications are not available in the home this visit. The following education was provided regarding medications, medication interactions, and look a like medications: no new medications. Medications  are unable to assess effectiveness at this time. Home health supplies by type and quantity ordered/delivered this visit include: n/a    Patient education provided this visit:  patient is dehydrated, needs immediate medical attention, re-educated caregiver Morena Correia to benefit of visit to ED d/w nurse and PT, MD notified of discharge form homecare and the above  . ASSESSMENT  Patient demonstrates limited benefit from skilled speech this day with poor PO intake and decreased willingness to participate in therapy, c/o abdominal discomfort, oral mucosa is dry, skin in tenting. SLP has completed education at this time. NO further follow up warranted. Recommend alternative conduit for adequate nutrition/hydration. Liat Campbell   PLAN  Home exercise program: GO TO ED  for assessment    Continued need for the following skills:n/a    The following discharge planning was discussed with the pt/caregiver:   discharge today

## 2021-09-09 PROCEDURE — 3331090002 HH PPS REVENUE DEBIT

## 2021-09-09 PROCEDURE — 3331090001 HH PPS REVENUE CREDIT

## 2021-09-10 PROCEDURE — 3331090002 HH PPS REVENUE DEBIT

## 2021-09-10 PROCEDURE — 3331090001 HH PPS REVENUE CREDIT

## 2021-09-11 PROCEDURE — 3331090001 HH PPS REVENUE CREDIT

## 2021-09-11 PROCEDURE — 3331090002 HH PPS REVENUE DEBIT

## 2021-09-12 VITALS
DIASTOLIC BLOOD PRESSURE: 83 MMHG | SYSTOLIC BLOOD PRESSURE: 124 MMHG | TEMPERATURE: 98 F | RESPIRATION RATE: 18 BRPM | OXYGEN SATURATION: 96 % | HEART RATE: 110 BPM

## 2021-09-12 PROCEDURE — 3331090001 HH PPS REVENUE CREDIT

## 2021-09-12 PROCEDURE — 3331090002 HH PPS REVENUE DEBIT

## 2021-09-12 NOTE — HOME HEALTH
Skilled reason for visit: assessment, teaching, vitals, med review    Caregiver involvement: lives with significant other, present for visit. Medications reviewed and all medications are available in the home this visit. The following education was provided regarding medications, medication interactions, and look alike medications (specify): all meds reviewed with patient including purpose of each, how and when to take. Medications  are somewhat effective at this time. Home health supplies by type and quantity ordered/delivered this visit include: NA    Patient education provided this visit: per care plan. Home safety and fall prevention. Infection control, hand washing and hygeine. Importance of med compliance and MD follow up. Reviewed turning and repositioning every 2 hours to prevent skin breakdown, keeping skin clean and dry and promptly removing soiled diapers and linens to protect skin integrity. Using pillows and wedges to prop patient up, floating heels. Nutrition and hydration. Pt barely eating or drinking per cg. Had patient stand on scale during visit, lost another pound this week. Pt wants feeding tube and is having increase in weakness, mucus membranes dry, HR elevated, reports dizzy on standing, per cg pt has appt with GI later this week to talk about feeding tube. Discussed case with PT and OT, all agree to encourage pt/cg to go to ER and see about admission, having tube placed for nutrition/hydration sooner. Verbalized understanding, states she will take him today and have family member help with transport to car due to pt weakness on standing. Skilled Care Performed this visit: assessment, teaching, vitals, med review, nutrtiion education, see above. Agency Progress toward goals: slow progress due to pt decline, not eating, increased weakness, dizzy on standing.      Patient's Progress towards personal goals: slow progress, see above    Home exercise program: per therapy- discharging this week. HEP for breathing/incontinence exerces provided/reviewed with admit handbook. Using IS every hour while awake. Continued need for the following skills: Nursing- cg signed [facility] HEART form this visit for DC next week as education has been completed.      Plan for next visit: DC next SNV, pt/cg agree with plan    Patient and/or caregiver notified and agrees to changes in the Plan of Care YES      The following discharge planning was discussed with the pt/caregiver: Pt will be dc when goals met, teaching complete, pt is knowledgable regarding medications and disease process and management

## 2021-09-13 PROCEDURE — 3331090002 HH PPS REVENUE DEBIT

## 2021-09-13 PROCEDURE — 3331090001 HH PPS REVENUE CREDIT

## 2021-09-14 ENCOUNTER — HOME CARE VISIT (OUTPATIENT)
Dept: HOME HEALTH SERVICES | Facility: HOME HEALTH | Age: 86
End: 2021-09-14
Payer: MEDICARE

## 2021-09-14 PROCEDURE — 3331090001 HH PPS REVENUE CREDIT

## 2021-09-14 PROCEDURE — 3331090002 HH PPS REVENUE DEBIT

## 2021-09-14 NOTE — Clinical Note
Home health agency DC completed 9/14/21. Pt/cg refused final SN visit and was completed from SN visit done on 9/7/21    Patient was seen by SN for skilled assessment, medication and disease management and teaching Dx CKD, weakness, failure to thrive. Pt had PT ST  therapy services. PT DC completed 9/7/21  DISCHARGE SUMMARY OF CARE Patient is s/p chronic kidney disease/JULIET,  and has been treated for BLE ROM, strengthening, gait training,  HEP training, safety training, and balance training as much as pt has been able to tolerate. Pt has had minimal participation with PT due to failure to thrive, weakness, and poor tolerance. After speaking with LPTA, Amy Pennington and the rest of the rehab team it has been best to DC pt at this time until he is better able to participate. PT arrived today for DC and nurse Honorio Knowles and SLP Tucker Garcia were just finishing their visits but had recommended to pt's life partner Navjot Hernández to take him to ER due to low BP, weakness, and possible dehydration. Navjot Hernández has agreed and pt to go to ER with Navjot Hernández today. Pt is insisting that he wants a feeding tube and has GI appt on Thursday if not admitted to hospital.  Navjot Hernández has signed [facility] form due to PT DC one visit early due to the above. PT advised Navjot Hernández that if pt gets tube or overall ability to tolerate PT improves then she can request order from MD again for Home PT. Navjot Hernández has requested rollator for pt and PT has faxed over orders. Discharge plan: Discharge from Home PT services as goals have been unabel to be met due to the above. At this time, pt is still seeing SN with plan to DC next week and SLP DC today as well. Dr. Jose Rafael King office contacted in regards to DC. ST DC completed 9/7/21  DC summary of care-Patient received swallow evaluation and therapy to increase safe PO nutrition. Patient and caregivers were educated to strategies to increase safe PO nutrition, optimal diet consistency to decrease risk of aspiration.   Patient educated to consider further medical intervention/alternative conduit for nutrition. 9/14/21 Pt is meeting all nursing goals and is ready for DC. Pt/cg aware of disease process, s/s to monitor for, who to report to/when. Patient/cg is able to verbalize understanding of all medications at this time: side effects, purposes, dosages, frequencies. SN reviewed in detail all medications with patient/cg. Pt/cg state that plan is for peg tube placement for nutrition soon. Patient is aware to follow up with PCP and other MD's.

## 2021-09-15 PROCEDURE — 3331090002 HH PPS REVENUE DEBIT

## 2021-09-15 PROCEDURE — 3331090001 HH PPS REVENUE CREDIT

## 2021-09-16 ENCOUNTER — TRANSCRIBE ORDER (OUTPATIENT)
Dept: INTERVENTIONAL RADIOLOGY/VASCULAR | Age: 86
End: 2021-09-16

## 2021-09-16 DIAGNOSIS — R13.0 APHAGIA: Primary | ICD-10-CM

## 2021-09-16 PROCEDURE — 3331090002 HH PPS REVENUE DEBIT

## 2021-09-16 PROCEDURE — 3331090001 HH PPS REVENUE CREDIT

## 2021-09-17 PROCEDURE — 400018 HH-NO PAY CLAIM PROCEDURE

## 2021-09-20 NOTE — HOME HEALTH
Home health agency discharge completed 9/14/21. Cg requested SN DC without visit.   meds were Reconciled on 9/7 visit prior to [facility] completed this date from last nursing visit assessment. Cg reports pt is going to get peg tube placement for tube feedings  pt/cg instructed to keep f/u appt as scheduled,  continue all meds as prescribed, contact MD office with any issues or questions. Pt aware of when to go to ER for evaluation.

## 2021-09-29 ENCOUNTER — APPOINTMENT (OUTPATIENT)
Dept: CT IMAGING | Age: 86
DRG: 871 | End: 2021-09-29
Attending: STUDENT IN AN ORGANIZED HEALTH CARE EDUCATION/TRAINING PROGRAM
Payer: MEDICARE

## 2021-09-29 ENCOUNTER — HOSPITAL ENCOUNTER (INPATIENT)
Age: 86
LOS: 7 days | Discharge: HOSPICE | DRG: 871 | End: 2021-10-06
Attending: STUDENT IN AN ORGANIZED HEALTH CARE EDUCATION/TRAINING PROGRAM | Admitting: HOSPITALIST
Payer: MEDICARE

## 2021-09-29 ENCOUNTER — HOSPITAL ENCOUNTER (OUTPATIENT)
Dept: INTERVENTIONAL RADIOLOGY/VASCULAR | Age: 86
Discharge: OTHER INSTITUTION | DRG: 871 | End: 2021-09-29
Attending: INTERNAL MEDICINE | Admitting: RADIOLOGY
Payer: MEDICARE

## 2021-09-29 ENCOUNTER — APPOINTMENT (OUTPATIENT)
Dept: GENERAL RADIOLOGY | Age: 86
DRG: 871 | End: 2021-09-29
Attending: STUDENT IN AN ORGANIZED HEALTH CARE EDUCATION/TRAINING PROGRAM
Payer: MEDICARE

## 2021-09-29 VITALS
SYSTOLIC BLOOD PRESSURE: 80 MMHG | DIASTOLIC BLOOD PRESSURE: 60 MMHG | OXYGEN SATURATION: 87 % | HEIGHT: 73 IN | HEART RATE: 136 BPM | BODY MASS INDEX: 14.05 KG/M2 | WEIGHT: 106 LBS

## 2021-09-29 DIAGNOSIS — A41.9 SEPSIS, DUE TO UNSPECIFIED ORGANISM, UNSPECIFIED WHETHER ACUTE ORGAN DYSFUNCTION PRESENT (HCC): ICD-10-CM

## 2021-09-29 DIAGNOSIS — R13.0 APHAGIA: ICD-10-CM

## 2021-09-29 DIAGNOSIS — K52.9 NONINFECTIOUS GASTROENTERITIS, UNSPECIFIED TYPE: ICD-10-CM

## 2021-09-29 DIAGNOSIS — Z71.89 GOALS OF CARE, COUNSELING/DISCUSSION: Primary | ICD-10-CM

## 2021-09-29 DIAGNOSIS — R53.81 DEBILITY: ICD-10-CM

## 2021-09-29 DIAGNOSIS — F03.90 DEMENTIA WITHOUT BEHAVIORAL DISTURBANCE, UNSPECIFIED DEMENTIA TYPE: ICD-10-CM

## 2021-09-29 DIAGNOSIS — K52.9 COLITIS: ICD-10-CM

## 2021-09-29 LAB
ALBUMIN SERPL-MCNC: 2.1 G/DL (ref 3.4–5)
ALBUMIN/GLOB SERPL: 0.4 {RATIO} (ref 0.8–1.7)
ALP SERPL-CCNC: 74 U/L (ref 45–117)
ALT SERPL-CCNC: 7 U/L (ref 16–61)
ANION GAP SERPL CALC-SCNC: 15 MMOL/L (ref 3–18)
ANION GAP SERPL CALC-SCNC: 16 MMOL/L (ref 3–18)
APTT PPP: 28.1 SEC (ref 23–36.4)
AST SERPL-CCNC: 20 U/L (ref 10–38)
ATRIAL RATE: 117 BPM
BASOPHILS # BLD: 0 K/UL (ref 0–0.1)
BASOPHILS NFR BLD: 0 % (ref 0–2)
BILIRUB SERPL-MCNC: 0.7 MG/DL (ref 0.2–1)
BUN SERPL-MCNC: 64 MG/DL (ref 7–18)
BUN SERPL-MCNC: 66 MG/DL (ref 7–18)
BUN/CREAT SERPL: 27 (ref 12–20)
BUN/CREAT SERPL: 28 (ref 12–20)
CALCIUM SERPL-MCNC: 8.5 MG/DL (ref 8.5–10.1)
CALCIUM SERPL-MCNC: 8.9 MG/DL (ref 8.5–10.1)
CALCULATED P AXIS, ECG09: 80 DEGREES
CALCULATED R AXIS, ECG10: -33 DEGREES
CALCULATED T AXIS, ECG11: 74 DEGREES
CHLORIDE SERPL-SCNC: 117 MMOL/L (ref 100–111)
CHLORIDE SERPL-SCNC: 118 MMOL/L (ref 100–111)
CO2 SERPL-SCNC: 15 MMOL/L (ref 21–32)
CO2 SERPL-SCNC: 16 MMOL/L (ref 21–32)
CREAT SERPL-MCNC: 2.29 MG/DL (ref 0.6–1.3)
CREAT SERPL-MCNC: 2.48 MG/DL (ref 0.6–1.3)
DIAGNOSIS, 93000: NORMAL
DIFFERENTIAL METHOD BLD: ABNORMAL
EOSINOPHIL # BLD: 0 K/UL (ref 0–0.4)
EOSINOPHIL NFR BLD: 0 % (ref 0–5)
ERYTHROCYTE [DISTWIDTH] IN BLOOD BY AUTOMATED COUNT: 15.3 % (ref 11.6–14.5)
ERYTHROCYTE [DISTWIDTH] IN BLOOD BY AUTOMATED COUNT: 15.4 % (ref 11.6–14.5)
GLOBULIN SER CALC-MCNC: 5.2 G/DL (ref 2–4)
GLUCOSE SERPL-MCNC: 139 MG/DL (ref 74–99)
GLUCOSE SERPL-MCNC: 161 MG/DL (ref 74–99)
HCT VFR BLD AUTO: 38.1 % (ref 36–48)
HCT VFR BLD AUTO: 40.4 % (ref 36–48)
HGB BLD-MCNC: 11.6 G/DL (ref 13–16)
HGB BLD-MCNC: 12.7 G/DL (ref 13–16)
INR PPP: 1.1 (ref 0.8–1.2)
LACTATE BLD-SCNC: 1.23 MMOL/L (ref 0.4–2)
LACTATE BLD-SCNC: 2.95 MMOL/L (ref 0.4–2)
LIPASE SERPL-CCNC: 196 U/L (ref 73–393)
LYMPHOCYTES # BLD: 1.4 K/UL (ref 0.9–3.6)
LYMPHOCYTES NFR BLD: 9 % (ref 21–52)
MCH RBC QN AUTO: 27 PG (ref 24–34)
MCH RBC QN AUTO: 27.5 PG (ref 24–34)
MCHC RBC AUTO-ENTMCNC: 30.4 G/DL (ref 31–37)
MCHC RBC AUTO-ENTMCNC: 31.4 G/DL (ref 31–37)
MCV RBC AUTO: 87.4 FL (ref 78–100)
MCV RBC AUTO: 88.8 FL (ref 78–100)
MONOCYTES # BLD: 0.7 K/UL (ref 0.05–1.2)
MONOCYTES NFR BLD: 4 % (ref 3–10)
NEUTS SEG # BLD: 13.5 K/UL (ref 1.8–8)
NEUTS SEG NFR BLD: 86 % (ref 40–73)
P-R INTERVAL, ECG05: 136 MS
PLATELET # BLD AUTO: 224 K/UL (ref 135–420)
PLATELET # BLD AUTO: 258 K/UL (ref 135–420)
PMV BLD AUTO: 10.4 FL (ref 9.2–11.8)
PMV BLD AUTO: 10.8 FL (ref 9.2–11.8)
POTASSIUM SERPL-SCNC: 4.8 MMOL/L (ref 3.5–5.5)
POTASSIUM SERPL-SCNC: 5 MMOL/L (ref 3.5–5.5)
PROT SERPL-MCNC: 7.3 G/DL (ref 6.4–8.2)
PROTHROMBIN TIME: 13.7 SEC (ref 11.5–15.2)
Q-T INTERVAL, ECG07: 358 MS
QRS DURATION, ECG06: 92 MS
QTC CALCULATION (BEZET), ECG08: 499 MS
RBC # BLD AUTO: 4.29 M/UL (ref 4.35–5.65)
RBC # BLD AUTO: 4.62 M/UL (ref 4.35–5.65)
SODIUM SERPL-SCNC: 148 MMOL/L (ref 136–145)
SODIUM SERPL-SCNC: 149 MMOL/L (ref 136–145)
TROPONIN I SERPL-MCNC: 0.07 NG/ML (ref 0–0.04)
VENTRICULAR RATE, ECG03: 117 BPM
WBC # BLD AUTO: 14 K/UL (ref 4.6–13.2)
WBC # BLD AUTO: 15.8 K/UL (ref 4.6–13.2)

## 2021-09-29 PROCEDURE — 85610 PROTHROMBIN TIME: CPT

## 2021-09-29 PROCEDURE — 96366 THER/PROPH/DIAG IV INF ADDON: CPT

## 2021-09-29 PROCEDURE — 83605 ASSAY OF LACTIC ACID: CPT

## 2021-09-29 PROCEDURE — 96375 TX/PRO/DX INJ NEW DRUG ADDON: CPT

## 2021-09-29 PROCEDURE — 2709999900 HC NON-CHARGEABLE SUPPLY

## 2021-09-29 PROCEDURE — 74011000250 HC RX REV CODE- 250: Performed by: HOSPITALIST

## 2021-09-29 PROCEDURE — 85025 COMPLETE CBC W/AUTO DIFF WBC: CPT

## 2021-09-29 PROCEDURE — 65660000000 HC RM CCU STEPDOWN

## 2021-09-29 PROCEDURE — 96361 HYDRATE IV INFUSION ADD-ON: CPT

## 2021-09-29 PROCEDURE — 99285 EMERGENCY DEPT VISIT HI MDM: CPT

## 2021-09-29 PROCEDURE — 96365 THER/PROPH/DIAG IV INF INIT: CPT

## 2021-09-29 PROCEDURE — 93005 ELECTROCARDIOGRAM TRACING: CPT

## 2021-09-29 PROCEDURE — 85027 COMPLETE CBC AUTOMATED: CPT

## 2021-09-29 PROCEDURE — 76450000000

## 2021-09-29 PROCEDURE — 71045 X-RAY EXAM CHEST 1 VIEW: CPT

## 2021-09-29 PROCEDURE — 85730 THROMBOPLASTIN TIME PARTIAL: CPT

## 2021-09-29 PROCEDURE — 74176 CT ABD & PELVIS W/O CONTRAST: CPT

## 2021-09-29 PROCEDURE — 84484 ASSAY OF TROPONIN QUANT: CPT

## 2021-09-29 PROCEDURE — 74011000250 HC RX REV CODE- 250: Performed by: STUDENT IN AN ORGANIZED HEALTH CARE EDUCATION/TRAINING PROGRAM

## 2021-09-29 PROCEDURE — 80053 COMPREHEN METABOLIC PANEL: CPT

## 2021-09-29 PROCEDURE — 99223 1ST HOSP IP/OBS HIGH 75: CPT | Performed by: HOSPITALIST

## 2021-09-29 PROCEDURE — 74011250636 HC RX REV CODE- 250/636: Performed by: HOSPITALIST

## 2021-09-29 PROCEDURE — 74011250636 HC RX REV CODE- 250/636: Performed by: STUDENT IN AN ORGANIZED HEALTH CARE EDUCATION/TRAINING PROGRAM

## 2021-09-29 PROCEDURE — 83690 ASSAY OF LIPASE: CPT

## 2021-09-29 PROCEDURE — 87040 BLOOD CULTURE FOR BACTERIA: CPT

## 2021-09-29 RX ORDER — ACETAMINOPHEN 650 MG/1
650 SUPPOSITORY RECTAL
Status: DISCONTINUED | OUTPATIENT
Start: 2021-09-29 | End: 2021-10-06 | Stop reason: HOSPADM

## 2021-09-29 RX ORDER — VANCOMYCIN HYDROCHLORIDE
1250 ONCE
Status: COMPLETED | OUTPATIENT
Start: 2021-09-29 | End: 2021-09-29

## 2021-09-29 RX ORDER — SODIUM CHLORIDE 0.9 % (FLUSH) 0.9 %
5-40 SYRINGE (ML) INJECTION AS NEEDED
Status: DISCONTINUED | OUTPATIENT
Start: 2021-09-29 | End: 2021-10-06 | Stop reason: HOSPADM

## 2021-09-29 RX ORDER — ONDANSETRON 2 MG/ML
4 INJECTION INTRAMUSCULAR; INTRAVENOUS
Status: DISCONTINUED | OUTPATIENT
Start: 2021-09-29 | End: 2021-10-06 | Stop reason: HOSPADM

## 2021-09-29 RX ORDER — SODIUM CHLORIDE 9 MG/ML
20 INJECTION, SOLUTION INTRAVENOUS CONTINUOUS
Status: DISCONTINUED | OUTPATIENT
Start: 2021-09-29 | End: 2021-09-29 | Stop reason: HOSPADM

## 2021-09-29 RX ORDER — TIMOLOL MALEATE 5 MG/ML
1 SOLUTION/ DROPS OPHTHALMIC 2 TIMES DAILY
Status: DISCONTINUED | OUTPATIENT
Start: 2021-09-29 | End: 2021-10-06 | Stop reason: HOSPADM

## 2021-09-29 RX ORDER — SODIUM CHLORIDE 0.9 % (FLUSH) 0.9 %
5-40 SYRINGE (ML) INJECTION EVERY 8 HOURS
Status: DISCONTINUED | OUTPATIENT
Start: 2021-09-29 | End: 2021-10-06 | Stop reason: HOSPADM

## 2021-09-29 RX ORDER — ONDANSETRON 4 MG/1
4 TABLET, ORALLY DISINTEGRATING ORAL
Status: DISCONTINUED | OUTPATIENT
Start: 2021-09-29 | End: 2021-10-06 | Stop reason: HOSPADM

## 2021-09-29 RX ORDER — SODIUM CHLORIDE 0.9 % (FLUSH) 0.9 %
5-10 SYRINGE (ML) INJECTION AS NEEDED
Status: DISCONTINUED | OUTPATIENT
Start: 2021-09-29 | End: 2021-10-06 | Stop reason: HOSPADM

## 2021-09-29 RX ORDER — POLYETHYLENE GLYCOL 3350 17 G/17G
17 POWDER, FOR SOLUTION ORAL DAILY PRN
Status: DISCONTINUED | OUTPATIENT
Start: 2021-09-29 | End: 2021-10-06 | Stop reason: HOSPADM

## 2021-09-29 RX ORDER — LIDOCAINE HYDROCHLORIDE 10 MG/ML
30 INJECTION, SOLUTION EPIDURAL; INFILTRATION; INTRACAUDAL; PERINEURAL ONCE
Status: DISCONTINUED | OUTPATIENT
Start: 2021-09-29 | End: 2021-09-29 | Stop reason: HOSPADM

## 2021-09-29 RX ORDER — SODIUM CHLORIDE 9 MG/ML
75 INJECTION, SOLUTION INTRAVENOUS CONTINUOUS
Status: DISCONTINUED | OUTPATIENT
Start: 2021-09-29 | End: 2021-09-30

## 2021-09-29 RX ORDER — ACETAMINOPHEN 325 MG/1
650 TABLET ORAL
Status: DISCONTINUED | OUTPATIENT
Start: 2021-09-29 | End: 2021-10-06 | Stop reason: HOSPADM

## 2021-09-29 RX ORDER — HEPARIN SODIUM 5000 [USP'U]/ML
5000 INJECTION, SOLUTION INTRAVENOUS; SUBCUTANEOUS EVERY 8 HOURS
Status: DISCONTINUED | OUTPATIENT
Start: 2021-09-29 | End: 2021-10-05

## 2021-09-29 RX ADMIN — TIMOLOL MALEATE 1 DROP: 5 SOLUTION OPHTHALMIC at 22:54

## 2021-09-29 RX ADMIN — CEFEPIME HYDROCHLORIDE 1 G: 1 INJECTION, POWDER, FOR SOLUTION INTRAMUSCULAR; INTRAVENOUS at 11:25

## 2021-09-29 RX ADMIN — SODIUM CHLORIDE 75 ML/HR: 900 INJECTION, SOLUTION INTRAVENOUS at 22:53

## 2021-09-29 RX ADMIN — SODIUM CHLORIDE 1000 ML: 900 INJECTION, SOLUTION INTRAVENOUS at 09:31

## 2021-09-29 RX ADMIN — SODIUM CHLORIDE 443 ML: 900 INJECTION, SOLUTION INTRAVENOUS at 10:01

## 2021-09-29 RX ADMIN — Medication 10 ML: at 23:13

## 2021-09-29 RX ADMIN — HEPARIN SODIUM 5000 UNITS: 5000 INJECTION INTRAVENOUS; SUBCUTANEOUS at 22:54

## 2021-09-29 RX ADMIN — VANCOMYCIN HYDROCHLORIDE 1250 MG: 10 INJECTION, POWDER, LYOPHILIZED, FOR SOLUTION INTRAVENOUS at 11:29

## 2021-09-29 NOTE — Clinical Note
Status[de-identified] INPATIENT [101]   Type of Bed: Stepdown [17]   Cardiac Monitoring Required?: Yes   Inpatient Hospitalization Certified Necessary for the Following Reasons: 3.  Patient receiving treatment that can only be provided in an inpatient setting (further clarification in H&P documentation)   Admitting Diagnosis: Colitis [454607]   Admitting Physician: Merilyn Schwab [[id number]]   Attending Physician: Merilyn Schwab [[id number]]   Estimated Length of Stay: 3-4 Midnights   Discharge Plan[de-identified] [facility]

## 2021-09-29 NOTE — H&P
4          HISTORY & PHYSICAL      Patient: Jose Carlos Artis MRN: [id number]  CSN: [id number]    YOB: 1927  Age: 80 y.o. Sex: male    DOA: 9/29/2021 LOS:  LOS: 0 days        DOA: 9/29/2021        Assessment/Plan     Active Problems:    Colitis (9/29/2021)      Sepsis ([address] (9/29/2021)        Plan:  1. Sepsis secondary to possible colitiscontinue broad-spectrum IV antibiotics. 2. JULIET on CKD 3 continue IVF, monitor creatinine. 3. Tachycardia likely secondary to #1continue gentle hydration  4. History of baseline dementia  5. History of dysphagiapatient has a PEG tube in place and presented to interventional radiology to get PEG tube replaced since it appeared to be clogged. 10. Advanced age with failure to thrive    DVT prophylaxisHeparin  DNR/DNI            HPI:     Jose Carlos Artis is a 80 y.o. male who has a history of dysphagia, baseline dementia, CKD 3 presents from interventional radiology where he was to get his PEG tube replaced however prior to performing the procedure patient became hypotensive and tachycardic, patient was sent to the emergency room for further evaluation. Patient is unable to give any information and most of the information was obtained from the ER notes and previous admissions. I tried to contact patient's son but was unable to do so. ER evaluationpatient noted to be septic with tachycardia, leukocytosis and JULIET on CKD 3. Patient underwent CT abdomen and pelvis which showed possible colitis. Patient started on broad-spectrum IV antibiotics and is being admitted to the hospital for further evaluation.     Palliative care consulted      Past Medical History:   Diagnosis Date    Arthritis     Back pain, chronic        Past Surgical History:   Procedure Laterality Date    HX HEENT      relieved pressure R eye 1/2014       Family History   Problem Relation Age of Onset    Diabetes Father     No Known Problems Mother        Social History     Socioeconomic History    Marital status:      Spouse name: Not on file    Number of children: Not on file    Years of education: Not on file    Highest education level: Not on file   Tobacco Use    Smoking status: Never Smoker    Smokeless tobacco: Never Used   Substance and Sexual Activity    Alcohol use: No     Alcohol/week: 0.8 standard drinks     Types: 1 Standard drinks or equivalent per week    Drug use: No     Social Determinants of Health     Financial Resource Strain:     Difficulty of Paying Living Expenses:    Food Insecurity:     Worried About Running Out of Food in the Last Year:     [address] in the Last Year:    Transportation Needs:     Lack of Transportation (Medical):  Lack of Transportation (Non-Medical):    Physical Activity:     Days of Exercise per Week:     Minutes of Exercise per Session:    Stress:     Feeling of Stress :    Social Connections:     Frequency of Communication with Friends and Family:     Frequency of Social Gatherings with Friends and Family:     Attends Jewish Services:     Active Member of Clubs or Organizations:     Attends Club or Organization Meetings:     Marital Status:        Prior to Admission medications    Medication Sig Start Date End Date Taking? Authorizing Provider   metroNIDAZOLE (FLAGYL) 500 mg tablet Take 500 mg by mouth three (3) times daily. Patient not taking: Reported on 9/29/2021    Provider, Historical   loperamide (IMODIUM) 2 mg capsule Take 2 mg by mouth three (3) times daily. Provider, Historical   acetaminophen (TYLENOL) 500 mg tablet Take 500 mg by mouth every six (6) hours as needed for Pain. Patient not taking: Reported on 9/29/2021    Provider, Historical   megestroL (MEGACE) 400 mg/10 mL (10 mL) suspension Take 400 mg by mouth daily. Provider, Historical   gabapentin (NEURONTIN) 300 mg capsule Take 300 mg by mouth three (3) times daily.  by mouth  Patient not taking: Reported on 9/29/2021    Provider, Historical   polyethylene glycol (MIRALAX) 17 gram packet Take 1 Packet by mouth daily as needed for Constipation. Patient not taking: Reported on 9/29/2021 8/17/21   Danica Pierce MD   therapeutic multivitamin [facility]) tablet Take 1 Tablet by mouth daily. Patient not taking: Reported on 9/29/2021 8/17/21   Danica Pierce MD   bisacodyl (DULCOLAX) 5 mg EC tablet Take 1 Tab by mouth daily as needed for Constipation. Patient not taking: Reported on 9/29/2021 1/29/18   Polly Ochoa MD   timolol (TIMOPTIC) 0.5 % ophthalmic solution Administer 1 Drop to both eyes two (2) times a day. Patient not taking: Reported on 9/29/2021 3/9/16   Provider, Historical       No Known Allergies    Review of Systems:    ROS cannot be obtained from patient given his current condition                 Physical Exam:      Visit Vitals  /76   Pulse (!) 108   Temp 97.4 °F (36.3 °C)   Resp 14   Ht 5' 9\" (1.753 m)   Wt 48.1 kg (106 lb)   SpO2 97%   BMI 15.65 kg/m²       Physical Exam:    Gen: In general, this is a poorly nourished male in no acute distress  HEENT: Sclerae nonicteric. Oral mucous membranes moist. Dentition poor  Neck: Supple with midline trachea. CV: RRR without murmur or rub appreciated. Resp:Respirations are unlabored without use of accessory muscles. Lung fields B/L without wheezes or rhonchi. Abd: Soft, nontender, nondistended. Extrem: Extremities are warm, without cyanosis or clubbing. No pitting pretibial edema. Skin: Warm, no visible rashes.   Neuro: Neuro exam cannot be performed    Labs Reviewed:    Recent Results (from the past 24 hour(s))   METABOLIC PANEL, BASIC    Collection Time: 09/29/21  8:07 AM   Result Value Ref Range    Sodium 148 (H) 136 - 145 mmol/L    Potassium 5.0 3.5 - 5.5 mmol/L    Chloride 117 (H) 100 - 111 mmol/L    CO2 15 (L) 21 - 32 mmol/L    Anion gap 16 3.0 - 18 mmol/L    Glucose 161 (H) 74 - 99 mg/dL    BUN 66 (H) 7.0 - 18 MG/DL    Creatinine 2.48 (H) 0.6 - 1.3 MG/DL BUN/Creatinine ratio 27 (H) 12 - 20      GFR est AA 30 (L) >60 ml/min/1.73m2    GFR est non-AA 24 (L) >60 ml/min/1.73m2    Calcium 8.9 8.5 - 10.1 MG/DL   CBC W/O DIFF    Collection Time: 09/29/21  8:07 AM   Result Value Ref Range    WBC 14.0 (H) 4.6 - 13.2 K/uL    RBC 4.62 4.35 - 5.65 M/uL    HGB 12.7 (L) 13.0 - 16.0 g/dL    HCT 40.4 36.0 - 48.0 %    MCV 87.4 78.0 - 100.0 FL    MCH 27.5 24.0 - 34.0 PG    MCHC 31.4 31.0 - 37.0 g/dL    RDW 15.4 (H) 11.6 - 14.5 %    PLATELET 384 245 - 701 K/uL    MPV 10.4 9.2 - 11.8 FL   PROTHROMBIN TIME + INR    Collection Time: 09/29/21  8:07 AM   Result Value Ref Range    Prothrombin time 13.7 11.5 - 15.2 sec    INR 1.1 0.8 - 1.2     PTT    Collection Time: 09/29/21  8:07 AM   Result Value Ref Range    aPTT 28.1 23.0 - 13.3 SEC   METABOLIC PANEL, COMPREHENSIVE    Collection Time: 09/29/21 10:00 AM   Result Value Ref Range    Sodium 149 (H) 136 - 145 mmol/L    Potassium 4.8 3.5 - 5.5 mmol/L    Chloride 118 (H) 100 - 111 mmol/L    CO2 16 (L) 21 - 32 mmol/L    Anion gap 15 3.0 - 18 mmol/L    Glucose 139 (H) 74 - 99 mg/dL    BUN 64 (H) 7.0 - 18 MG/DL    Creatinine 2.29 (H) 0.6 - 1.3 MG/DL    BUN/Creatinine ratio 28 (H) 12 - 20      GFR est AA 32 (L) >60 ml/min/1.73m2    GFR est non-AA 27 (L) >60 ml/min/1.73m2    Calcium 8.5 8.5 - 10.1 MG/DL    Bilirubin, total 0.7 0.2 - 1.0 MG/DL    ALT (SGPT) 7 (L) 16 - 61 U/L    AST (SGOT) 20 10 - 38 U/L    Alk.  phosphatase 74 45 - 117 U/L    Protein, total 7.3 6.4 - 8.2 g/dL    Albumin 2.1 (L) 3.4 - 5.0 g/dL    Globulin 5.2 (H) 2.0 - 4.0 g/dL    A-G Ratio 0.4 (L) 0.8 - 1.7     CBC WITH AUTOMATED DIFF    Collection Time: 09/29/21 10:00 AM   Result Value Ref Range    WBC 15.8 (H) 4.6 - 13.2 K/uL    RBC 4.29 (L) 4.35 - 5.65 M/uL    HGB 11.6 (L) 13.0 - 16.0 g/dL    HCT 38.1 36.0 - 48.0 %    MCV 88.8 78.0 - 100.0 FL    MCH 27.0 24.0 - 34.0 PG    MCHC 30.4 (L) 31.0 - 37.0 g/dL    RDW 15.3 (H) 11.6 - 14.5 %    PLATELET 996 409 - 859 K/uL    MPV 10.8 9.2 - 11.8 FL    NEUTROPHILS 86 (H) 40 - 73 %    LYMPHOCYTES 9 (L) 21 - 52 %    MONOCYTES 4 3 - 10 %    EOSINOPHILS 0 0 - 5 %    BASOPHILS 0 0 - 2 %    ABS. NEUTROPHILS 13.5 (H) 1.8 - 8.0 K/UL    ABS. LYMPHOCYTES 1.4 0.9 - 3.6 K/UL    ABS. MONOCYTES 0.7 0.05 - 1.2 K/UL    ABS. EOSINOPHILS 0.0 0.0 - 0.4 K/UL    ABS. BASOPHILS 0.0 0.0 - 0.1 K/UL    DF AUTOMATED     TROPONIN I    Collection Time: 09/29/21 10:00 AM   Result Value Ref Range    Troponin-I, QT 0.07 (H) 0.0 - 0.045 NG/ML   LIPASE    Collection Time: 09/29/21 10:00 AM   Result Value Ref Range    Lipase 196 73 - 393 U/L   POC LACTIC ACID    Collection Time: 09/29/21 11:09 AM   Result Value Ref Range    Lactic Acid (POC) 2.95 (HH) 0.40 - 2.00 mmol/L   EKG, 12 LEAD, INITIAL    Collection Time: 09/29/21 11:21 AM   Result Value Ref Range    Ventricular Rate 117 BPM    Atrial Rate 117 BPM    P-R Interval 136 ms    QRS Duration 92 ms    Q-T Interval 358 ms    QTC Calculation (Bezet) 499 ms    Calculated P Axis 80 degrees    Calculated R Axis -33 degrees    Calculated T Axis 74 degrees    Diagnosis       Sinus tachycardia with premature atrial complexes with aberrant conduction  Left axis deviation  Low voltage QRS  Incomplete right bundle branch block  Cannot rule out Anterior infarct , age undetermined  Abnormal ECG    Confirmed by Kusum Diallo MD, [facility] ([id number]) on 9/29/2021 11:51:47 AM         Imaging Reviewed:    XR Results (most recent):  Results from Hospital Encounter encounter on 09/29/21    XR CHEST PORT    Narrative  INDICATION: meets SIRS criteria    TECHNIQUE: Portable chest radiograph    COMPARISON: Chest CT 26 January 2018, chest radiograph 24 August 2021    FINDINGS: The lungs are adequately inflated. No focal consolidation, effusion or  pneumothorax. Heart size within normal limits. Calcified pleural plaques are again noted. No acute osseous abnormality. Impression  No acute cardiopulmonary findings.        CT Results (most recent):  Results from Hospital Encounter encounter on 09/29/21    CT ABD PELV WO CONT    Narrative  CT ABDOMEN AND PELVIS UNENHANCED    CPT CODE: 23182 and 99485    INDICATION: Abdominal pain. Tachycardia noted in the cardiac Cath Lab.    TECHNIQUE: 5 mm collimation axial images obtained from the diaphragm to the  level of the pubic symphysis without intravenous contrast.    All CT scans at this facility are performed using dose optimization technique as  appropriate to this specific exam, to include automated exposure control,  adjustment of the mA and/or KP according to patient size or use of iterative  reconstruction techniques. COMPARISON: Prior CT 7/8/2021. ABDOMEN FINDINGS:  Mild COPD changes at the lung bases. Calcified granuloma right lower lobe. Lenticular atelectasis or scarring dependent left lung base adjacent to a tiny  pleural effusion. Scattered pleural-based calcification/granuloma. Lack of intravenous and oral contrast renders this study suboptimal for  evaluation of solid abdominal organs, vasculature and bowel. Liver not enlarged. No definite mass. Gallbladder is borderline distended, but contains no calcified stones. Pancreas significantly motion degraded. Not enlarged, with no regional  inflammation. Spleen unremarkable. Adrenal glands motion degraded but no mass identified. No hydronephrosis or calcified renal stones.  -Similar motion degraded hyperdense lobulated 1 cm lesion superior medial right  renal cortex.  -Similar 8mm hyperdense lateral right cortical renal lesion image 25.  -Similar subcentimeter cortical hypodensities anterior lateral on the left image  23. Abdominal aorta is nonaneurysmal. Distal thoracic aorta to the thoracoabdominal  junction is prominent measuring up to 3.1 cm oblique AP diameter, unchanged. PELVIS FINDINGS:  No small bowel distention to suggest obstruction. Stomach is empty. Colon is not  distended.  Cannot well assess for wall thickening or other abnormalities given  lack of distention and contrast. Subjective mild increased density in the  pericolonic mesenteric fat along the descending colon level of the pelvic inlet  image 58 for example, though significantly motion degraded. No definite  diverticuli regionally. Cannot exclude some regional inflammation. No free fluid in the pelvis. Prostate not enlarged. Bladder nearly empty and not well assessed. Similar heterogeneous density of the substance of the pelvic bones and sacrum,  with partial fusion across the SI joints. May reflect Paget's disease as appears  chronic. Similar mild superior endplate impression deformity of L2. Impression  1. Subjective mild degree of increased density/stranding pericolonic mid to  distal descending colon to proximal sigmoid levels, though noncontrast exam.  Cannot exclude colitis though colon is collapsed and not well assessed. Clinical  correlation needed. 2. Multiple bilateral hyperdense renal lesions may reflect hemorrhagic or  proteinaceous cyst. No hydronephrosis or stones. 3. Similar segmental enlargement of the distal thoracic aorta as previous. 4. Tiny left-sided pleural effusion new from previous. Calcified pleural and  diaphragmatic plaques.  -No bowel distention to suggest obstruction. 5. 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 Similar chronic degenerative change  and lumbar compression deformities. Danilo Ocampo MD  9/29/2021, 4:02 PM        Disclaimer: Sections of this note are dictated using utilizing voice recognition software. Minor typographical errors may be present. If questions arise, please do not hesitate to contact me or call our department.

## 2021-09-29 NOTE — PALLIATIVE CARE
[address] [facility] [phone]   [facility] [phone]    Palliative Care consulted for pt who was in pre-op for peg tube placement. Experienced hypotension and tachycardia. Pt to be admitted for Sepsis. MD would like Palliative Care to verify pt to remain DNR/DNI/Limited, vs transition to comfort measures. LMSW called and left messages for pt's son, Dimas Umana at [phone] and [phone], requesting return call. Thank you for this referral to Palliative Care. The palliative care team remains available to provide support for pt and his family and to address goals of care, as required.      Blas Rudolph, [address] [facility]  Palliative Medicine Inpatient   [facility]  Palliative COPE Line: [phone])

## 2021-09-29 NOTE — REMOTE MONITORING
Spoke with primary RN Dom regarding 6 hour Sepsis bundle.       Chad Rosas [address]  Callback # [phone]

## 2021-09-29 NOTE — H&P
OUTPATIENT HISTORY AND PHYSICAL      Today 9/29/2021     Indication/Symptoms:   Betito Garrison is a 80 y.o. male here for G-Tube placement hx of dysphagia failure to thrive    Current Meds:    Prior to Admission medications    Medication Sig Start Date End Date Taking? Authorizing Provider   loperamide (IMODIUM) 2 mg capsule Take 2 mg by mouth three (3) times daily. Yes Provider, Historical   megestroL (MEGACE) 400 mg/10 mL (10 mL) suspension Take 400 mg by mouth daily. Yes Provider, Historical   metroNIDAZOLE (FLAGYL) 500 mg tablet Take 500 mg by mouth three (3) times daily. Patient not taking: Reported on 9/29/2021    Provider, Historical   acetaminophen (TYLENOL) 500 mg tablet Take 500 mg by mouth every six (6) hours as needed for Pain. Patient not taking: Reported on 9/29/2021    Provider, Historical   gabapentin (NEURONTIN) 300 mg capsule Take 300 mg by mouth three (3) times daily. by mouth  Patient not taking: Reported on 9/29/2021    Provider, Historical   polyethylene glycol (MIRALAX) 17 gram packet Take 1 Packet by mouth daily as needed for Constipation. Patient not taking: Reported on 9/29/2021 8/17/21   Danika Francois MD   therapeutic multivitamin [facility]) tablet Take 1 Tablet by mouth daily. Patient not taking: Reported on 9/29/2021 8/17/21   Danika Francois MD   bisacodyl (DULCOLAX) 5 mg EC tablet Take 1 Tab by mouth daily as needed for Constipation. Patient not taking: Reported on 9/29/2021 1/29/18   Yadira Montalvo MD   timolol (TIMOPTIC) 0.5 % ophthalmic solution Administer 1 Drop to both eyes two (2) times a day.   Patient not taking: Reported on 9/29/2021 3/9/16   Provider, Historical       Allergies:    No Known Allergies    Comorbid Conditions:    Past Medical History:   Diagnosis Date    Arthritis     Back pain, chronic           Past Surgical History:   Procedure Laterality Date    HX HEENT      relieved pressure R eye 1/2014     Data:    Visit Vitals   6' 1\" (1.854 m)   Wt 48.1 kg (106 lb)   BMI 13.99 kg/m²   :  No results for input(s): PLT, PLTEXT in the last 72 hours. No lab exists for component:  HCT  No results for input(s): INR, APTT, INREXT in the last 72 hours. No lab exists for component: PT    The H & P and/or progress notes and any available imaging were reviewed. The risks, indications and possible alternatives to the procedure, including doing nothing, were discussed and informed consent was obtained. Physical Exam:      Mental status:   Alert and oriented. Examination specific to the procedure proposed to be performed and any co morbid conditions:   Mallampati classification 3 ,  ASA IV   Heart:   RRR. Lungs:   CTAB. No wheezes, rales or rhonchi. The patient is an appropriate candidate to undergo the planned procedure and sedation.     Adrienne Nicole PA-C

## 2021-09-29 NOTE — ED TRIAGE NOTES
Client scheduled to have peg tube placed today, during procedure became hypotensive and afib with rvr in 150's.

## 2021-09-29 NOTE — ED PROVIDER NOTES
EMERGENCY DEPARTMENT HISTORY AND PHYSICAL EXAM    9:56 AM    Date: 9/29/2021  Patient Name: Ilene Reyes    History of Presenting Illness     No chief complaint on file. History Provided By: Patient  Location/Duration/Severity/Modifying factors   HPI  Ilene Reyes is a 80 y.o. male with past medical history of aphasia, failure to thrive presenting for evaluation of hypotension. Patient was in the Cath Lab this morning to have his G-tube replaced, but was noted to be tachycardic and hypotensive so was sent to the emergency department. The patient has no specific complaints at this time although is a poor historian. He reports pain in his abdomen that started today. This is unfortunate all the history that I am able to obtain. PCP: Mary Bar MD    Current Facility-Administered Medications   Medication Dose Route Frequency Provider Last Rate Last Admin    sodium chloride (NS) flush 5-10 mL  5-10 mL IntraVENous PRN Moses Block MD        cefepime (MAXIPIME) 1 g in sterile water (preservative free) 10 mL IV syringe  1 g IntraVENous Q24H Moses Block MD   1 g at 09/29/21 1125    VANCOMYCIN INFORMATION NOTE   Other Rx Dosing/Monitoring Moses Block MD         Current Outpatient Medications   Medication Sig Dispense Refill    metroNIDAZOLE (FLAGYL) 500 mg tablet Take 500 mg by mouth three (3) times daily. (Patient not taking: Reported on 9/29/2021)      loperamide (IMODIUM) 2 mg capsule Take 2 mg by mouth three (3) times daily.  acetaminophen (TYLENOL) 500 mg tablet Take 500 mg by mouth every six (6) hours as needed for Pain. (Patient not taking: Reported on 9/29/2021)      megestroL (MEGACE) 400 mg/10 mL (10 mL) suspension Take 400 mg by mouth daily.  gabapentin (NEURONTIN) 300 mg capsule Take 300 mg by mouth three (3) times daily.  by mouth (Patient not taking: Reported on 9/29/2021)      polyethylene glycol (MIRALAX) 17 gram packet Take 1 Packet by mouth daily as needed for Constipation. (Patient not taking: Reported on 9/29/2021) 15 Packet 0    therapeutic multivitamin (THERAGRAN) tablet Take 1 Tablet by mouth daily. (Patient not taking: Reported on 9/29/2021) 30 Tablet 0    bisacodyl (DULCOLAX) 5 mg EC tablet Take 1 Tab by mouth daily as needed for Constipation. (Patient not taking: Reported on 9/29/2021) 25 Tab 2    timolol (TIMOPTIC) 0.5 % ophthalmic solution Administer 1 Drop to both eyes two (2) times a day. (Patient not taking: Reported on 9/29/2021)         Past History     Past Medical History:  Past Medical History:   Diagnosis Date    Arthritis     Back pain, chronic        Past Surgical History:  Past Surgical History:   Procedure Laterality Date    HX HEENT      relieved pressure R eye 1/2014       Family History:  Family History   Problem Relation Age of Onset    Diabetes Father     No Known Problems Mother        Social History:  Social History     Tobacco Use    Smoking status: Never Smoker    Smokeless tobacco: Never Used   Substance Use Topics    Alcohol use: No     Alcohol/week: 0.8 standard drinks     Types: 1 Standard drinks or equivalent per week    Drug use: No       Allergies:  No Known Allergies    I reviewed and confirmed the above information with patient and updated as necessary. Review of Systems     Review of Systems   Unable to perform ROS: Dementia       Physical Exam     Visit Vitals  BP 94/66   Pulse (!) 119   Temp 97.4 °F (36.3 °C)   Resp 15   Ht 5' 9\" (1.753 m)   Wt 48.1 kg (106 lb)   SpO2 94%   BMI 15.65 kg/m²       Physical Exam  Vitals and nursing note reviewed. Constitutional:       Appearance: Normal appearance. He is not ill-appearing. Comments: Frail elderly adult male, lying comfortably. In no distress   HENT:      Mouth/Throat:      Mouth: Mucous membranes are moist.   Eyes:      Pupils: Pupils are equal, round, and reactive to light. Cardiovascular:      Rate and Rhythm: Regular rhythm.  Tachycardia present. Pulses: Normal pulses. Heart sounds: Normal heart sounds. Pulmonary:      Effort: Pulmonary effort is normal.      Breath sounds: Normal breath sounds. Abdominal:      General: Abdomen is flat. Tenderness: Tenderness: No rebound or guarding. Musculoskeletal:         General: No swelling. Normal range of motion. Cervical back: Normal range of motion. Skin:     General: Skin is warm. Neurological:      General: No focal deficit present. Mental Status: He is alert and oriented to person, place, and time.          Diagnostic Study Results     Labs -  Recent Results (from the past 24 hour(s))   METABOLIC PANEL, BASIC    Collection Time: 09/29/21  8:07 AM   Result Value Ref Range    Sodium 148 (H) 136 - 145 mmol/L    Potassium 5.0 3.5 - 5.5 mmol/L    Chloride 117 (H) 100 - 111 mmol/L    CO2 15 (L) 21 - 32 mmol/L    Anion gap 16 3.0 - 18 mmol/L    Glucose 161 (H) 74 - 99 mg/dL    BUN 66 (H) 7.0 - 18 MG/DL    Creatinine 2.48 (H) 0.6 - 1.3 MG/DL    BUN/Creatinine ratio 27 (H) 12 - 20      GFR est AA 30 (L) >60 ml/min/1.73m2    GFR est non-AA 24 (L) >60 ml/min/1.73m2    Calcium 8.9 8.5 - 10.1 MG/DL   CBC W/O DIFF    Collection Time: 09/29/21  8:07 AM   Result Value Ref Range    WBC 14.0 (H) 4.6 - 13.2 K/uL    RBC 4.62 4.35 - 5.65 M/uL    HGB 12.7 (L) 13.0 - 16.0 g/dL    HCT 40.4 36.0 - 48.0 %    MCV 87.4 78.0 - 100.0 FL    MCH 27.5 24.0 - 34.0 PG    MCHC 31.4 31.0 - 37.0 g/dL    RDW 15.4 (H) 11.6 - 14.5 %    PLATELET 700 515 - 369 K/uL    MPV 10.4 9.2 - 11.8 FL   PROTHROMBIN TIME + INR    Collection Time: 09/29/21  8:07 AM   Result Value Ref Range    Prothrombin time 13.7 11.5 - 15.2 sec    INR 1.1 0.8 - 1.2     PTT    Collection Time: 09/29/21  8:07 AM   Result Value Ref Range    aPTT 28.1 23.0 - 67.1 SEC   METABOLIC PANEL, COMPREHENSIVE    Collection Time: 09/29/21 10:00 AM   Result Value Ref Range    Sodium 149 (H) 136 - 145 mmol/L    Potassium 4.8 3.5 - 5.5 mmol/L    Chloride 118 (H) 100 - 111 mmol/L    CO2 16 (L) 21 - 32 mmol/L    Anion gap 15 3.0 - 18 mmol/L    Glucose 139 (H) 74 - 99 mg/dL    BUN 64 (H) 7.0 - 18 MG/DL    Creatinine 2.29 (H) 0.6 - 1.3 MG/DL    BUN/Creatinine ratio 28 (H) 12 - 20      GFR est AA 32 (L) >60 ml/min/1.73m2    GFR est non-AA 27 (L) >60 ml/min/1.73m2    Calcium 8.5 8.5 - 10.1 MG/DL    Bilirubin, total 0.7 0.2 - 1.0 MG/DL    ALT (SGPT) 7 (L) 16 - 61 U/L    AST (SGOT) 20 10 - 38 U/L    Alk. phosphatase 74 45 - 117 U/L    Protein, total 7.3 6.4 - 8.2 g/dL    Albumin 2.1 (L) 3.4 - 5.0 g/dL    Globulin 5.2 (H) 2.0 - 4.0 g/dL    A-G Ratio 0.4 (L) 0.8 - 1.7     CBC WITH AUTOMATED DIFF    Collection Time: 09/29/21 10:00 AM   Result Value Ref Range    WBC 15.8 (H) 4.6 - 13.2 K/uL    RBC 4.29 (L) 4.35 - 5.65 M/uL    HGB 11.6 (L) 13.0 - 16.0 g/dL    HCT 38.1 36.0 - 48.0 %    MCV 88.8 78.0 - 100.0 FL    MCH 27.0 24.0 - 34.0 PG    MCHC 30.4 (L) 31.0 - 37.0 g/dL    RDW 15.3 (H) 11.6 - 14.5 %    PLATELET 146 537 - 473 K/uL    MPV 10.8 9.2 - 11.8 FL    NEUTROPHILS 86 (H) 40 - 73 %    LYMPHOCYTES 9 (L) 21 - 52 %    MONOCYTES 4 3 - 10 %    EOSINOPHILS 0 0 - 5 %    BASOPHILS 0 0 - 2 %    ABS. NEUTROPHILS 13.5 (H) 1.8 - 8.0 K/UL    ABS. LYMPHOCYTES 1.4 0.9 - 3.6 K/UL    ABS. MONOCYTES 0.7 0.05 - 1.2 K/UL    ABS. EOSINOPHILS 0.0 0.0 - 0.4 K/UL    ABS.  BASOPHILS 0.0 0.0 - 0.1 K/UL    DF AUTOMATED     TROPONIN I    Collection Time: 09/29/21 10:00 AM   Result Value Ref Range    Troponin-I, QT 0.07 (H) 0.0 - 0.045 NG/ML   LIPASE    Collection Time: 09/29/21 10:00 AM   Result Value Ref Range    Lipase 196 73 - 393 U/L   POC LACTIC ACID    Collection Time: 09/29/21 11:09 AM   Result Value Ref Range    Lactic Acid (POC) 2.95 (HH) 0.40 - 2.00 mmol/L   EKG, 12 LEAD, INITIAL    Collection Time: 09/29/21 11:21 AM   Result Value Ref Range    Ventricular Rate 117 BPM    Atrial Rate 117 BPM    P-R Interval 136 ms    QRS Duration 92 ms    Q-T Interval 358 ms    QTC Calculation (Bezet) 499 ms Calculated P Axis 80 degrees    Calculated R Axis -33 degrees    Calculated T Axis 74 degrees    Diagnosis       Sinus tachycardia with premature atrial complexes with aberrant conduction  Left axis deviation  Low voltage QRS  Incomplete right bundle branch block  Cannot rule out Anterior infarct , age undetermined  Abnormal ECG    Confirmed by Nell Marin MD, Rashadjuicejose Baighortensiaor ([id number]) on 9/29/2021 11:51:47 AM     POC LACTIC ACID    Collection Time: 09/29/21  4:35 PM   Result Value Ref Range    Lactic Acid (POC) 1.23 0.40 - 2.00 mmol/L         Radiologic Studies -   CT ABD PELV WO CONT   Final Result   1. Subjective mild degree of increased density/stranding pericolonic mid to   distal descending colon to proximal sigmoid levels, though noncontrast exam.   Cannot exclude colitis though colon is collapsed and not well assessed. Clinical   correlation needed. 2. Multiple bilateral hyperdense renal lesions may reflect hemorrhagic or   proteinaceous cyst. No hydronephrosis or stones. 3. Similar segmental enlargement of the distal thoracic aorta as previous. 4. Tiny left-sided pleural effusion new from previous. Calcified pleural and   diaphragmatic plaques.   -No bowel distention to suggest obstruction. 5. 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 Similar chronic degenerative change   and lumbar compression deformities. XR CHEST PORT   Final Result      No acute cardiopulmonary findings. Medical Decision Making   I am the first provider for this patient. I reviewed the vital signs, available nursing notes, past medical history, past surgical history, family history and social history. Vital Signs-Reviewed the patient's vital signs.     EKG: Nondiagnostic    Records Reviewed: Nursing Notes (Time of Review: 9:56 AM)    Provider Notes (Medical Decision Making):   MDM  80year-old male here with hypotension as a chief complaint, not noted to be hypotensive here in the emergency department however he was tachycardic and tachypneic. Suspect likely infection somewhere, reviewed morning labs and had a leukocytosis. Will obtain sepsis work-up. ED Course: Progress Notes, Reevaluation, and Consults:   Patient is afebrile but tachycardic. Exam notable for tenderness in the abdomen without rebound or guarding  Remainder physical exam is unremarkable. Sepsis work-up obtained, will obtain CT abdomen pelvis without contrast.    CBC leukocytosis and left shift, lactic acid elevated at 2.95. Will start patient on vancomycin, Zosyn for broad-spectrum coverage  BMP with renal insufficiency worse than baseline, today's creatinine is 2 9. No severe metabolic derangements  Troponin mildly elevated 0.07, no chest pain  EKG nondiagnostic  CT abdomen pelvis    IMPRESSION  1. Subjective mild degree of increased density/stranding pericolonic mid to  distal descending colon to proximal sigmoid levels, though noncontrast exam.  Cannot exclude colitis though colon is collapsed and not well assessed. Clinical  correlation needed.     2. Multiple bilateral hyperdense renal lesions may reflect hemorrhagic or  proteinaceous cyst. No hydronephrosis or stones.     3. Similar segmental enlargement of the distal thoracic aorta as previous.     4. Tiny left-sided pleural effusion new from previous. Calcified pleural and  diaphragmatic plaques.  -No bowel distention to suggest obstruction.     5. 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 Similar chronic degenerative change  and lumbar compression deformities. Discussed above results with the patient, feel that he would benefit from hospitalization. I discussed with hospitalist who agrees to admit. Continued antibiotics, IV fluid resuscitation and rehydration as well as potentially replacing the PEG tube when he is more stable. Critical Care  Performed by: Lazarus Snuffer, MD  Authorized by:  Lazarus Snuffer, MD     Critical care provider statement:     Critical care time (minutes):  40    Critical care start time:  9/29/2021 4:59 PM    Critical care end time:  9/29/2021 5:39 PM    Critical care was necessary to treat or prevent imminent or life-threatening deterioration of the following conditions:  Sepsis        Diagnosis     Clinical Impression:   1. Sepsis, due to unspecified organism, unspecified whether acute organ dysfunction present ([facility] [address]    2. Noninfectious gastroenteritis, unspecified type        Disposition: Admit    Follow-up Information    None          Patient's Medications   Start Taking    No medications on file   Continue Taking    ACETAMINOPHEN (TYLENOL) 500 MG TABLET    Take 500 mg by mouth every six (6) hours as needed for Pain. BISACODYL (DULCOLAX) 5 MG EC TABLET    Take 1 Tab by mouth daily as needed for Constipation. GABAPENTIN (NEURONTIN) 300 MG CAPSULE    Take 300 mg by mouth three (3) times daily. by mouth    LOPERAMIDE (IMODIUM) 2 MG CAPSULE    Take 2 mg by mouth three (3) times daily. MEGESTROL (MEGACE) 400 MG/10 ML (10 ML) SUSPENSION    Take 400 mg by mouth daily. METRONIDAZOLE (FLAGYL) 500 MG TABLET    Take 500 mg by mouth three (3) times daily. POLYETHYLENE GLYCOL (MIRALAX) 17 GRAM PACKET    Take 1 Packet by mouth daily as needed for Constipation. THERAPEUTIC MULTIVITAMIN (THERAGRAN) TABLET    Take 1 Tablet by mouth daily. TIMOLOL (TIMOPTIC) 0.5 % OPHTHALMIC SOLUTION    Administer 1 Drop to both eyes two (2) times a day. These Medications have changed    No medications on file   Stop Taking    No medications on file       Mojgan Corbett MD   Emergency Medicine   September 29, 2021, 9:56 AM     This note is dictated utilizing Dragon voice recognition software. Unfortunately this leads to occasional typographical errors using the voice recognition. I apologize in advance if the situation occurs. If questions occur please do not hesitate to contact me directly.     Blu Lassiter MD

## 2021-09-29 NOTE — REMOTE MONITORING
Attempted to contact primary RN in regards to the sepsis bundle.       Chad Herrera 20  Callback # [phone]

## 2021-09-29 NOTE — PROGRESS NOTES
Cath holding summary     Patient escorted to cath holding from waiting area via wheelchair with caregiver, patient appears weak, alert and oriented x 4, questions answered by caregiver [facility]. Changed into gown and placed on monitor. NPO since MN. Lab results, med rec and H&P reviewed on chart. Notified Brian Coley PA of hypotension and tachycardia      0820  Notified Gelacio PA of BP 78/66, Order received for 250 NS bolus    0825  IV burning patient, IV infiltrated. 0830  Notified IR of infiltrated IV, still no bolus, still tachycardia and hypotentsion. Verified with IR to call medical emergency. [facility] emergency called, Patient transported to ER     [phone]  Rachell-caregiver notified.

## 2021-09-30 ENCOUNTER — APPOINTMENT (OUTPATIENT)
Dept: GENERAL RADIOLOGY | Age: 86
DRG: 871 | End: 2021-09-30
Attending: HOSPITALIST
Payer: MEDICARE

## 2021-09-30 PROBLEM — E43 SEVERE PROTEIN-CALORIE MALNUTRITION (HCC): Chronic | Status: ACTIVE | Noted: 2021-08-14

## 2021-09-30 LAB
ALBUMIN SERPL-MCNC: 1.8 G/DL (ref 3.4–5)
ALBUMIN/GLOB SERPL: 0.5 {RATIO} (ref 0.8–1.7)
ALP SERPL-CCNC: 55 U/L (ref 45–117)
ALT SERPL-CCNC: <6 U/L (ref 16–61)
ANION GAP SERPL CALC-SCNC: 7 MMOL/L (ref 3–18)
AST SERPL-CCNC: 12 U/L (ref 10–38)
BASOPHILS # BLD: 0 K/UL (ref 0–0.1)
BASOPHILS NFR BLD: 0 % (ref 0–2)
BILIRUB SERPL-MCNC: 0.4 MG/DL (ref 0.2–1)
BUN SERPL-MCNC: 56 MG/DL (ref 7–18)
BUN/CREAT SERPL: 32 (ref 12–20)
CALCIUM SERPL-MCNC: 8.1 MG/DL (ref 8.5–10.1)
CHLORIDE SERPL-SCNC: 127 MMOL/L (ref 100–111)
CO2 SERPL-SCNC: 18 MMOL/L (ref 21–32)
CREAT SERPL-MCNC: 1.75 MG/DL (ref 0.6–1.3)
DIFFERENTIAL METHOD BLD: ABNORMAL
EOSINOPHIL # BLD: 0.1 K/UL (ref 0–0.4)
EOSINOPHIL NFR BLD: 1 % (ref 0–5)
ERYTHROCYTE [DISTWIDTH] IN BLOOD BY AUTOMATED COUNT: 15.5 % (ref 11.6–14.5)
GLOBULIN SER CALC-MCNC: 4 G/DL (ref 2–4)
GLUCOSE SERPL-MCNC: 96 MG/DL (ref 74–99)
HCT VFR BLD AUTO: 33.4 % (ref 36–48)
HGB BLD-MCNC: 10.2 G/DL (ref 13–16)
LYMPHOCYTES # BLD: 1.4 K/UL (ref 0.9–3.6)
LYMPHOCYTES NFR BLD: 16 % (ref 21–52)
MCH RBC QN AUTO: 27.3 PG (ref 24–34)
MCHC RBC AUTO-ENTMCNC: 30.5 G/DL (ref 31–37)
MCV RBC AUTO: 89.3 FL (ref 78–100)
MONOCYTES # BLD: 0.7 K/UL (ref 0.05–1.2)
MONOCYTES NFR BLD: 8 % (ref 3–10)
NEUTS SEG # BLD: 6.4 K/UL (ref 1.8–8)
NEUTS SEG NFR BLD: 74 % (ref 40–73)
PLATELET # BLD AUTO: 176 K/UL (ref 135–420)
PMV BLD AUTO: 10.6 FL (ref 9.2–11.8)
POTASSIUM SERPL-SCNC: 5.1 MMOL/L (ref 3.5–5.5)
PROT SERPL-MCNC: 5.8 G/DL (ref 6.4–8.2)
RBC # BLD AUTO: 3.74 M/UL (ref 4.35–5.65)
SODIUM SERPL-SCNC: 152 MMOL/L (ref 136–145)
VANCOMYCIN SERPL-MCNC: 12.2 UG/ML (ref 5–40)
WBC # BLD AUTO: 8.6 K/UL (ref 4.6–13.2)

## 2021-09-30 PROCEDURE — 99222 1ST HOSP IP/OBS MODERATE 55: CPT | Performed by: NURSE PRACTITIONER

## 2021-09-30 PROCEDURE — 2709999900 HC NON-CHARGEABLE SUPPLY

## 2021-09-30 PROCEDURE — 65660000000 HC RM CCU STEPDOWN

## 2021-09-30 PROCEDURE — 85025 COMPLETE CBC W/AUTO DIFF WBC: CPT

## 2021-09-30 PROCEDURE — 74011250636 HC RX REV CODE- 250/636: Performed by: HOSPITALIST

## 2021-09-30 PROCEDURE — 80053 COMPREHEN METABOLIC PANEL: CPT

## 2021-09-30 PROCEDURE — 74011000250 HC RX REV CODE- 250: Performed by: HOSPITALIST

## 2021-09-30 PROCEDURE — 80202 ASSAY OF VANCOMYCIN: CPT

## 2021-09-30 PROCEDURE — 99232 SBSQ HOSP IP/OBS MODERATE 35: CPT | Performed by: HOSPITALIST

## 2021-09-30 PROCEDURE — 77030040831 HC BAG URINE DRNG MDII -A

## 2021-09-30 PROCEDURE — 74011000258 HC RX REV CODE- 258: Performed by: HOSPITALIST

## 2021-09-30 PROCEDURE — 36415 COLL VENOUS BLD VENIPUNCTURE: CPT

## 2021-09-30 PROCEDURE — 74018 RADEX ABDOMEN 1 VIEW: CPT

## 2021-09-30 RX ORDER — DEXTROSE MONOHYDRATE AND SODIUM CHLORIDE 5; .45 G/100ML; G/100ML
75 INJECTION, SOLUTION INTRAVENOUS CONTINUOUS
Status: DISCONTINUED | OUTPATIENT
Start: 2021-09-30 | End: 2021-10-05

## 2021-09-30 RX ADMIN — CEFEPIME HYDROCHLORIDE 1 G: 1 INJECTION, POWDER, FOR SOLUTION INTRAMUSCULAR; INTRAVENOUS at 09:24

## 2021-09-30 RX ADMIN — Medication 10 ML: at 21:25

## 2021-09-30 RX ADMIN — HEPARIN SODIUM 5000 UNITS: 5000 INJECTION INTRAVENOUS; SUBCUTANEOUS at 21:24

## 2021-09-30 RX ADMIN — Medication 10 ML: at 06:00

## 2021-09-30 RX ADMIN — VANCOMYCIN HYDROCHLORIDE 750 MG: 750 INJECTION, POWDER, LYOPHILIZED, FOR SOLUTION INTRAVENOUS at 09:24

## 2021-09-30 RX ADMIN — THIAMINE HYDROCHLORIDE 200 MG: 100 INJECTION, SOLUTION INTRAMUSCULAR; INTRAVENOUS at 18:20

## 2021-09-30 RX ADMIN — HEPARIN SODIUM 5000 UNITS: 5000 INJECTION INTRAVENOUS; SUBCUTANEOUS at 05:55

## 2021-09-30 RX ADMIN — DEXTROSE MONOHYDRATE AND SODIUM CHLORIDE 75 ML/HR: 5; .45 INJECTION, SOLUTION INTRAVENOUS at 18:19

## 2021-09-30 RX ADMIN — TIMOLOL MALEATE 1 DROP: 5 SOLUTION OPHTHALMIC at 09:25

## 2021-09-30 RX ADMIN — TIMOLOL MALEATE 1 DROP: 5 SOLUTION OPHTHALMIC at 18:20

## 2021-09-30 NOTE — ROUTINE PROCESS
0715 - Bedside and Verbal shift change report given to [facility] (oncoming nurse) by Austen Cueto (offgoing nurse). Report included the following information SBAR, Kardex, Intake/Output, MAR and Recent Results.

## 2021-09-30 NOTE — ACP (ADVANCE CARE PLANNING)
Advance Care Planning     General Advance Care Planning (ACP) Conversation      Date of Conversation: 9/30/21      Healthcare Decision Maker:   No healthcare decision makers have been documented. Click here to complete [employer] including selection of the Healthcare Decision Maker Relationship (ie \"Primary\")    Today we documented Decision Maker(s) consistent with Legal Next of Kin hierarchy. Killianeloy Cartwright [facility] - [phone]    Amoshailey Deepashahbazisabel [facility] - [phone]    Content/Action Overview:    Has ACP document(s) on file - reflects the patient's care preferences           VIELKA Elias RN  Care Management  Pager: [phone]

## 2021-09-30 NOTE — CONSULTS
Comprehensive Nutrition Assessment    Type and Reason for Visit: Initial, Positive nutrition screen    Nutrition Recommendations/Plan:   - Once NGT placement confirmed, initiate tube feeding of Nepro at 10 mL/hr and advance as tolerated by 10 mL q 8 hours to goal rate of 45 mL/hr with 150 mL q 4 hour water flushes (goal regimen to provide 1944 kcal, 87 gm protein, 783 mL free water, 100% RDIs). - Monitor and replace electrolytes as needed due to concern for refeeding syndrome. Add thiamine and multivitamin.   - IVF per MD.     Nutrition Assessment:  Hypotensive and tachycardic during G tube replacement with plan to place NGT and provide enteral feeding until patient stable for G tube replacement. Hypernatremia noted on NS. BG of 96. K up to 5.1. Malnutrition Assessment:  Malnutrition Status:  Severe malnutrition    Context:  Chronic illness     Findings of the 6 clinical characteristics of malnutrition:   Energy Intake:  7 - 75% or less est energy requirements for 1 month or longer  Weight Loss:  7.000 - Greater than 20% over 1 year       Nutrition History and Allergies: Past medical history of CKD, dementia and failure to thrive admitted with c/o abdominal pain, possible colitis with clogged G tube admitted for replacement by IR. Poor po intake x past month PTA s/p PEG placement due to inadequate intake. Weight history per chart review: 136 lb (7/8/21), 130 lb (8/17/21), 106 lb (8/31/21) with 30 lb, 22% weight loss x year. NKFA. Estimated Daily Nutrient Needs:  Energy (kcal): 3740-9159; Weight Used for Energy Requirements: Ideal (73 kg)  Protein (g): ; Weight Used for Protein Requirements: Ideal (1-1.5)  Fluid (ml/day): 8412-8069; Method Used for Fluid Requirements: 1 ml/kcal    Nutrition Related Findings:  Last BM PTA.  Pertinent Medications: NS at 75 mL/hr (changed to D5 1/2NS at 75 mL/hr to provide 90 gm dextrose, 306 kcal per day)      Wounds:  None       Current Nutrition Therapies:  DIET NPO    Anthropometric Measures:  · Height:  5' 9\" (175.3 cm)  · Current Body Wt:  48.1 kg (106 lb 0.7 oz)   · Admission Body Wt:  106 lb 0.7 oz    · Usual Body Wt:  61.7 kg (136 lb)     · Ideal Body Wt:  160 lbs:  66.3 %   · BMI Category:  Underweight (BMI less than 22) age over 72       Nutrition Diagnosis:   · Severe malnutrition, In context of chronic illness related to catabolic illness, inadequate protein-energy intake, cognitive or neurological impairment as evidenced by poor intake prior to admission, weight loss greater than or equal to 20% in 1 year      Nutrition Interventions:   Food and/or Nutrient Delivery: Continue NPO, Start tube feeding, IV fluid delivery, Vitamin supplement  Nutrition Education and Counseling: No recommendations at this time  Coordination of Nutrition Care: Continue to monitor while inpatient, Coordination of community care    Goals:  Nutritional needs will be met through adequate oral intake or nutrition support within the next 7 days       Nutrition Monitoring and Evaluation:   Behavioral-Environmental Outcomes: None identified  Food/Nutrient Intake Outcomes: Enteral nutrition intake/tolerance, Vitamin/mineral intake, IVF intake  Physical Signs/Symptoms Outcomes: Biochemical data, GI status, Fluid status or edema, Hemodynamic status, Nutrition focused physical findings    Discharge Planning:    Enteral nutrition     Electronically signed by Raymond Varma RD, [address]  on 9/30/2021 at 1:40 PM    Contact: [phone]

## 2021-09-30 NOTE — PALLIATIVE CARE
[address] [facility] [phone]  DR. [facility] [phone]    LMSW made telephone contact with pt's son, Denisse York. ([phone]), to discuss pt's goals of care and verify they want to continue current course of treatment. Pt's son reports this is their last attempt to improve pt's medical condition, and they felt like they had to try this one last thing. He reports \"My father is skin and bones,\" and that he has not been doing well. LMSW explained benefits of feeding tubes/artificial nutrition for patients with dementia is generally contraindicated and that we see minimal if any signficant improvement, when used. Pt's son maintains, they have to try this one last  thing. LMSW explained POST that was completed in August indicates no use of feeding tube. Pt's son states he  is willing to complete a new post authorizing feeding tube. LMSW obtained email address and Maine Chun RN sent completed post via [email or web address] to him for signature. He reports he won't be able to get to it until later in day. LMSW assured him, this is okay. Thank you for this referral to Palliative Care. The palliative care team remains available to provide support for patient and his family during hospitalization. Goals of care have been addressed. CODE STATUS:  DNR/DNI/ w/Long-term feeding tube.     Laury Jessica, [facility]  Palliative Medicine Inpatient   DR. [facility]  Palliative COPE Line: [phone] ([phone])

## 2021-09-30 NOTE — CONSULTS
Palliative Medicine  [facility]: [phone]  [facility]: [address] [place] [address] Way: [phone]    Patient Name: Cecy Mott  YOB: 1927    Date of Initial Consult: 9/30/2021   Reason for Consult: care decisions  Requesting Provider: Dr Gus Jimenez  Primary Care Physician: Amarilis Velasquez MD      SUMMARY:   Cecy Mott is a 80y.o. year old with a past history of dementia, CKD 3, dysphagia has PEG tube , who was admitted on 9/29/2021 from home  with a diagnosis of colitis. Mr Camila Cisse presented for a planned PEG tube replacement however prior to procedure he became hypotensive and tachycardiac. Found to be septic secondary to possible colitis. Current medical issues leading to Palliative Medicine involvement include: 80year old male who is known to our team from previous admissions. Very thin, chronically ill appearing elderly gentleman. Palliative medicine is consulted for support and care decisions. PALLIATIVE DIAGNOSES:   1. Goals of care / care decisions   2. Sepsis   3. Colitis   4. Dementia   5. Debility        PLAN:   1. Goals of care / care decisions Mr Camila Cisse seen along with Palliative team. He was alert, [place], confused. Not able to participate in his medical decisions. Noted AMD on file naming his son Rachid Harmon. Call to Mr Faiza Dykes. Affirmed goals of care DNR/DNI limited interventions, family wishes for long term feeding tube as a last \"ditch effort \" to see if he has any recovery. We did share, even with GT feeds very thin, likely not absorbing all the feeds and GT feeds are unlikely to change course of his illness and dementia. Goals of care DNR/DNI limited interventions, long term feeding options. POST sent to Mr Faiza Dykes for docu signature   2. Sepsis on IVAB likely secondary to colitis   3. Colitis on IVAB; denies abdominal pain   4. Dementia at baseline, verbal recognizes his family.  FAST score likely 6C  5. Debility per son mostly chair bed bound, weak PPS 50 indicating mostly chair bound    6. Initial consult note routed to primary continuity provider  7. Communicated plan of care with: Palliative IDT, son       Patient/Health Care Proxy Stated Goals: Prolong life      TREATMENT PREFERENCES:   Code Status: DNR/ DNI     Advance Care Planning:  [] [facility] Interdisciplinary Team has updated the ACP Navigator with [address] and Patient Capacity    Primary Decision Maker ([address]):     Medical Interventions: Limited additional interventionsAMD on file naming his son Beau Syed as MPOA   Son Rob Oglesby as secondary MPOA  Artificially Administered Nutrition: Feeding tube long-term, if indicated     Other:  As far as possible, the palliative care team has discussed with patient / health care proxy about goals of care / treatment preferences for patient. HISTORY:     History obtained from: chart and son     CHIEF COMPLAINT: sepsis     HPI/SUBJECTIVE:    The patient is:   [] Verbal and participatory  [x] Non-participatory due to:  Confusion   Please see summary     Clinical Pain Assessment (nonverbal scale for nonverbal patients): Clinical Pain Assessment  Severity: 0          Duration: for how long has pt been experiencing pain (e.g., 2 days, 1 month, years)  Frequency: how often pain is an issue (e.g., several times per day, once every few days, constant)     FUNCTIONAL ASSESSMENT:     Palliative Performance Scale (PPS):  PPS: 50    ECOG  ECOG Status : Completely disabled     PSYCHOSOCIAL/SPIRITUAL SCREENING:      Any spiritual / Yazdanism concerns:unable to assess for patient   [] Yes /  [] No    Caregiver Burnout:  [] Yes /  [] No /  [x] No Caregiver Present      Anticipatory grief assessment:  Unable to assess for patient   [] Normal  / [] Maladaptive        REVIEW OF SYSTEMS:     Positive and pertinent negative findings in ROS are noted above in HPI.   The following systems were [] reviewed / [x] unable to be reviewed as noted in HPI  Other findings are noted below. Systems: constitutional, ears/nose/mouth/throat, respiratory, gastrointestinal, genitourinary, musculoskeletal, integumentary, neurologic, psychiatric, endocrine. Positive findings noted below. Modified ESAS Completed by: provider   Fatigue: 5 Drowsiness: 0     Pain: 0   Anxiety: 0       Dyspnea: 0                    PHYSICAL EXAM:     Wt Readings from Last 3 Encounters:   09/29/21 48.1 kg (106 lb)   09/29/21 48.1 kg (106 lb)   08/31/21 48.1 kg (106 lb)     Blood pressure 100/64, pulse 77, temperature 98 °F (36.7 °C), resp. rate 14, height 5' 9\" (1.753 m), weight 48.1 kg (106 lb), SpO2 100 %. Pain:  Pain Scale 1: Numeric (0 - 10)  Pain Intensity 1: 0                 Last bowel movement: none recorded     Constitutional: elderly, chronically ill male who is reclining in bed in NAD   Cardiovascular RRR  Respiratory: breathing not labored   Gastrointestinal:   Musculoskeletal: no deformity, no tenderness to palpation  Skin: warm, dry soft   Neurologic: alert confused, [place]         HISTORY:     Active Problems:    Severe protein-calorie malnutrition ([facility] [address] (8/14/2021)      Colitis (9/29/2021)      Sepsis ([facility] [address] (9/29/2021)      Past Medical History:   Diagnosis Date    Arthritis     Back pain, chronic       Past Surgical History:   Procedure Laterality Date    HX HEENT      relieved pressure R eye 1/2014      Family History   Problem Relation Age of Onset    Diabetes Father     No Known Problems Mother      History reviewed, no pertinent family history.   Social History     Tobacco Use    Smoking status: Never Smoker    Smokeless tobacco: Never Used   Substance Use Topics    Alcohol use: No     Alcohol/week: 0.8 standard drinks     Types: 1 Standard drinks or equivalent per week     No Known Allergies   Current Facility-Administered Medications   Medication Dose Route Frequency    dextrose 5 % - 0.45% NaCl infusion  75 mL/hr IntraVENous CONTINUOUS    [START ON 03/1/4773] multivit-folic acid-herbal 443 (WELLESSE PLUS) oral liquid 30 mL  30 mL Per NG tube DAILY    thiamine (B-1) 200 mg in 0.9% sodium chloride 50 mL IVPB  200 mg IntraVENous DAILY    sodium chloride (NS) flush 5-10 mL  5-10 mL IntraVENous PRN    cefepime (MAXIPIME) 1 g in sterile water (preservative free) 10 mL IV syringe  1 g IntraVENous Q24H    VANCOMYCIN INFORMATION NOTE   Other Rx Dosing/Monitoring    sodium chloride (NS) flush 5-40 mL  5-40 mL IntraVENous Q8H    sodium chloride (NS) flush 5-40 mL  5-40 mL IntraVENous PRN    acetaminophen (TYLENOL) tablet 650 mg  650 mg Oral Q6H PRN    Or    acetaminophen (TYLENOL) suppository 650 mg  650 mg Rectal Q6H PRN    polyethylene glycol (MIRALAX) packet 17 g  17 g Oral DAILY PRN    ondansetron (ZOFRAN ODT) tablet 4 mg  4 mg Oral Q8H PRN    Or    ondansetron (ZOFRAN) injection 4 mg  4 mg IntraVENous Q6H PRN    heparin (porcine) injection 5,000 Units  5,000 Units SubCUTAneous Q8H    timolol (TIMOPTIC) 0.5 % ophthalmic solution 1 Drop  1 Drop Both Eyes BID        LAB AND IMAGING FINDINGS:     Lab Results   Component Value Date/Time    WBC 8.6 09/30/2021 02:08 AM    HGB 10.2 (L) 09/30/2021 02:08 AM    PLATELET 069 36/81/7703 02:08 AM     Lab Results   Component Value Date/Time    Sodium 152 (H) 09/30/2021 02:08 AM    Potassium 5.1 09/30/2021 02:08 AM    Chloride 127 (H) 09/30/2021 02:08 AM    CO2 18 (L) 09/30/2021 02:08 AM    BUN 56 (H) 09/30/2021 02:08 AM    Creatinine 1.75 (H) 09/30/2021 02:08 AM    Calcium 8.1 (L) 09/30/2021 02:08 AM    Magnesium 2.5 08/24/2021 03:33 PM    Phosphorus 2.3 (L) 08/17/2021 04:30 AM      Lab Results   Component Value Date/Time    Alk.  phosphatase 55 09/30/2021 02:08 AM    Protein, total 5.8 (L) 09/30/2021 02:08 AM    Albumin 1.8 (L) 09/30/2021 02:08 AM    Globulin 4.0 09/30/2021 02:08 AM     Lab Results   Component Value Date/Time    INR 1.1 09/29/2021 08:07 AM    Prothrombin time 13.7 09/29/2021 08:07 AM    aPTT 28.1 09/29/2021 08:07 AM      Lab Results   Component Value Date/Time    Iron 25 (L) 04/20/2013 03:15 AM    TIBC 182 (L) 04/20/2013 03:15 AM    Iron % saturation 14 (L) 04/20/2013 03:15 AM      No results found for: PH, PCO2, PO2  No components found for: [place]   Lab Results   Component Value Date/Time     08/13/2021 04:20 PM    CK - MB <1.0 08/13/2021 04:20 PM              Total time: 50 minutes   Counseling / coordination time, spent as noted above:   > 50% counseling / coordination: yes with son Mariano Pitt    Prolonged service was provided for  []30 min   []75 min in face to face time in the presence of the patient, spent as noted above.   Time Start:   Time End:

## 2021-09-30 NOTE — ACP (ADVANCE CARE PLANNING)
Advance Care Planning     General Advance Care Planning (ACP) Conversation      Date of Conversation:9/30/21      Healthcare Decision Maker:   No healthcare decision makers have been documented. Click here to complete [address] including selection of the Healthcare Decision Maker Relationship (ie \"Primary\")    Today we documented Decision Maker(s) consistent with Legal Next of Kin hierarchy. Content/Action Overview: Has ACP document(s) on file - reflects the patient's care preferences      [facility]   -   [phone]    Jennyfer Melgar  [facility]   -  [phone]          MASON FajardoN RN  Care Management  Pager: [phone]

## 2021-09-30 NOTE — PROGRESS NOTES
Patient arrived to the unit at about 2030 from the ED. No report was called from anybody from that department. Receiving nurse got no information regarding this patient and the patient was unable to communicate to the nurse. Call was made to the ED to try to get some type of information on this patient. I was told a day shift nurse had the patient and that the nurse was already gone for the day.

## 2021-09-30 NOTE — PROGRESS NOTES
Reason for Admission:  Failure to thrive in adult [R62.7]  Altered mental status [R41.82]                 RUR Score:    22            Plan for utilizing home health: To be determined                      Likelihood of Readmission:   Moderate                         Do you (patient/family) have any concerns for transition/discharge?  no    Transition of Care Plan:       Initial assessment completed with patient's son Juliana Pond. Cognitive status of patient: Dementia. Face sheet information confirmed:  yes. The patient's son Juliana Pond. participate in his discharge plan and to receive any needed information. This patient lives in a home with significant other and caregiver. Patient is not able to navigate steps as needed. Prior to hospitalization, patient was considered to be independent with ADLs/IADLS : no . If not independent,  patient needs assist with : dressing, bathing, food preparation, cooking, toileting and grooming    Patient has a current ACP document on file: yes      Healthcare Decision Maker:     Click here to complete [address] including selection of the Healthcare Decision Maker Relationship (ie \"Primary\")    The son will be available to transport patient home upon discharge. The patient already has Jimmy Mendez, W/C, Shower chair medical equipment available in the home. Patient is not currently active with home health. Patient has not stayed in a skilled nursing facility or rehab. Was  stay within last 60 days : no. This patient is on dialysis :no     Currently, the discharge plan is to be determined. Per pt's son Becky Brewer, pt will be returning home when discharged. No nursing facility. The patient's family assists with his medications as directed. Patient's current insurance is MedicareDaniel      Care Management Interventions  PCP Verified by CM:  Yes  Palliative Care Criteria Met (RRAT>21 & CHF Dx)?: No  Mode of Transport at Discharge:  Other (see comment)  Transition of Care Consult (CM Consult): Discharge Planning  Support Systems: Spouse/Significant Other, Child(pawan)  Read Only, Retired: Current Support Network: Own Home, Family Lives [place], Has Personal Caregivers  Confirm Follow Up Transport: Family  The Plan for Transition of Care is Related to the Following Treatment Goals : home health  The Patient and/or Patient Representative was Provided with a Choice of Provider and Agrees with the Discharge Plan?: Yes  Freedom of Choice List was Provided with Basic Dialogue that Supports the Patient's Individualized Plan of Care/Goals, Treatment Preferences and Shares the Quality Data Associated with the Providers?: Yes  Discharge Location  Discharge Placement: Unable to determine at this time          VIELKA Ingram RN  Care Management  Pager: [phone]

## 2021-09-30 NOTE — PROGRESS NOTES
Reason for Admission:  Colitis [K52.9]  Sepsis ([facility] [address] [A41.9]                 RUR Score:    23           Plan for utilizing home health: To be determined                      Likelihood of Readmission:   Moderate                         Do you (patient/family) have any concerns for transition/discharge?  no    Transition of Care Plan:       Initial assessment completed with patient's son Angeline Hector. Cognitive status of patient: dementia     Face sheet information confirmed:  yes. The patient's son Jose Luis Zepeda participate in his discharge plan and to receive any needed information. This patient lives in a single family home with significant other and caregiver  Patient is not able to navigate steps as needed. Prior to hospitalization, patient was considered to be independent with ADLs/IADLS : no . If not independent,  patient needs assist with : dressing, bathing, food preparation, cooking, toileting and grooming    Patient has a current ACP document on file: yes      Healthcare Decision Maker:     Click here to complete [address] including selection of the Healthcare Decision Maker Relationship (ie \"Primary\")    The son will be available to transport patient home upon discharge. The patient already has [employer], W/C, Shower chair,  medical equipment available in the home. Patient is not currently active with home health. Patient has not stayed in a skilled nursing facility or rehab. Was  stay within last 60 days : no. This patient is on dialysis :no     Currently, the discharge plan is to be determined. Per pt's son Suma Zamorano is for pt to return home. The patient 's family assists with his medications as directed. Patient's current insurance is Medicare, [employer]      Care Management Interventions  PCP Verified by CM: Yes  Mode of Transport at Discharge:  Other (see comment) (family)  Transition of Care Consult (CM Consult): Discharge Planning  Support Systems: Spouse/Significant Other, Child(pawan)  Confirm Follow Up Transport: Family  Discharge Location  Discharge Placement: Unable to determine at this time        VIELKA Still RN  Care Management  Pager: [phone]

## 2021-09-30 NOTE — ACP (ADVANCE CARE PLANNING)
Palliative Medicine    Advanced Steps [facility] (Physician Orders for Scope of Treatment)       Date of conversation: 9/30/2021   Location: [facility]  Length (minutes): 20    Participants:   [] Patient    [x] Healthcare agent (already designated in existing ACP document)    Name: [facility]. Relationship to Patient: Son     Phone number: [phone]    Advanced Steps® ACP Facilitator: [place] D S[facility]chez [facility] [place] [facility]    Conversation Topics   (If Patient does not have decision making capacity, Agent/Surrogate responds based on understanding of how patient would respond if capable)    Understanding of Medical Condition/s AND Potential Complications:    Healthcare Agent/Other Surrogate: Pt already has a POST on file from previous admission. New POST form was sent to the pt's son/mPOA Melissa Foley. To complete reflecting that they would like to attempt a long term feeding tube for the pt. Melissa Galan Understands that this feeding tube may not improve his father's quality of life nor may it necessarily extend it. He stated, \"This is our last shot at trying to get him better. \" A new POST form was sent to the pt's son reflecting the following measures: DDNR status, limited medical interventions (NO intubation under any circumstances, ok with CPAP/BiPAP, avoid ICU if possible) and ok with long term feeding tube.      Needs to discuss with spiritual/Zoroastrianism advisor: [] Yes  [x] No    Needs more information about illness and complications:  [] Yes  [x] No      Cardiopulmonary Resuscitation        Order Elected for CPR:  []  Attempt Resuscitation [x]  Do Not Attempt Resuscitation      When NOT in Cardiopulmonary Arrest, Order Elected:      [] Comfort Measures  [x] Limited Additional Interventions  [] Full Interventions    Artificially Administered Nutrition, Order Elected:    [] No Feeding Tube   [] Feeding Tube for a defined trial period  [x] Feeding Tube long-term if indicated    The following was provided (check all that apply):      Healthcare Decision Information Cards:   [] Help with Breathing Facts   [] Tube Feeding Facts   [] CPR Facts      [x] Review of existing Advance Directive  [] Assistance with Completion of New Advance Directive   [x] Review of [place] POST Form       Meeting Outcomes:   [] ACP discussion completed   [x] [facility] form completed  [x] [facility] prepared for Provider review and signature   [] Original placed on Chart, if in facility (form to be sent with patient at discharge)  [x] Copy given to healthcare agent    [] Copy scanned to electronic medical record     Follow-up plan:     [] Schedule follow-up conversation to continue planning   [x] Referred individual to Provider for additional questions/concerns   [x] Advised patient/agent/surrogate to review completed POST form and update if needed with changes in condition, patient preferences or care setting     [] This note routed to one or more involved healthcare providers    Pt remains DNR/DNI. Family is ok with long term feeding tube. Awaiting return of new POST form with electronic signature by pt's son/Debora Berger Will. Potential dispo plan will be to return home when medically stable. Thank you for the Palliative Medicine consult and allowing us to participate in the care of Mr. Karon Lepe. Will continue to monitor and provide support.     Nemo Hui RN, BSN  Palliative Medicine Inpatient RN  [facility]  Palliative COPE Line: [phone]

## 2021-09-30 NOTE — PROGRESS NOTES
[facility]  Progress Note    Patient: Rodney Yeager Age: 80 y.o. : 3/14/1927 MR#: [id number] SSN: [id number]  Date/Time: 2021     Subjective:     Patient seen and evaluated, lying in bed, no acute distress. No new overnight events. Discussed with palliative care, reached out to son who wishes to continue current treatment and patient will undergo PEG tube placement when he is more stable and plan is to discharge home with home health        Assessment/Plan:     1. Sepsis secondary to possible colitiscontinue broad-spectrum IV antibiotics. 2. JULIET on CKD 3 continue IVF, monitor creatinine, improving  3. Tachycardia likely secondary to #1continue gentle hydration  4. History of baseline dementia  5. History of dysphagiapatient has a PEG tube in place and presented to interventional radiology to get PEG tube replaced since it appeared to be clogged. We will replace PEG tube when patient is more stable to undergo procedure. 10. Advanced age with failure to thrive    DVT prophylaxisHeparin  DNR/DNR    Discussed with palliative care, they reached out to son who mentions that he would like to continue current treatment plan and get a replacement on the patient's PEG tube when he is stable. Plan will be to discharge patient home with home health.                 Elian Cedeño MD  21      Case discussed with:  []Patient  []Family  []Nursing  []Case Management  DVT Prophylaxis:  []Lovenox  []Hep SQ  []SCDs  []Coumadin   []On Heparin gtt    Objective:   VS:   Visit Vitals  /82 (BP 1 Location: Left upper arm, BP Patient Position: At rest)   Pulse (!) 104   Temp 98.3 °F (36.8 °C)   Resp 18   Ht 5' 9\" (1.753 m)   Wt 48.1 kg (106 lb)   SpO2 100%   BMI 15.65 kg/m²      Tmax/24hrs: Temp (24hrs), Av.1 °F (36.7 °C), Min:97.8 °F (36.6 °C), Max:98.5 °F (36.9 °C)  IOBRIEF    Intake/Output Summary (Last 24 hours) at 2021 1123  Last data filed at 9/29/2021 1757  Gross per 24 hour   Intake 1693 ml   Output    Net 1693 ml       General: Lying in bed, no acute distress  HEENT: PERRLA, anicteric sclerae. Pulmonary:  CTA Bilaterally. No Wheezing/Rhonchi/Rales. Cardiovascular: Regular rate and Rhythm. GI:  Soft, Non distended, Non tender. + Bowel sounds. Extremities:  No edema, cyanosis, clubbing. No calf tenderness. Neurologic: Unable to perform neurological examination  Additional:    Medications:   Current Facility-Administered Medications   Medication Dose Route Frequency    sodium chloride (NS) flush 5-10 mL  5-10 mL IntraVENous PRN    cefepime (MAXIPIME) 1 g in sterile water (preservative free) 10 mL IV syringe  1 g IntraVENous Q24H    VANCOMYCIN INFORMATION NOTE   Other Rx Dosing/Monitoring    sodium chloride (NS) flush 5-40 mL  5-40 mL IntraVENous Q8H    sodium chloride (NS) flush 5-40 mL  5-40 mL IntraVENous PRN    acetaminophen (TYLENOL) tablet 650 mg  650 mg Oral Q6H PRN    Or    acetaminophen (TYLENOL) suppository 650 mg  650 mg Rectal Q6H PRN    polyethylene glycol (MIRALAX) packet 17 g  17 g Oral DAILY PRN    ondansetron (ZOFRAN ODT) tablet 4 mg  4 mg Oral Q8H PRN    Or    ondansetron (ZOFRAN) injection 4 mg  4 mg IntraVENous Q6H PRN    heparin (porcine) injection 5,000 Units  5,000 Units SubCUTAneous Q8H    timolol (TIMOPTIC) 0.5 % ophthalmic solution 1 Drop  1 Drop Both Eyes BID    0.9% sodium chloride infusion  75 mL/hr IntraVENous CONTINUOUS       Imaging:   XR Results (most recent):  Results from Hospital Encounter encounter on 09/29/21    XR CHEST PORT    Narrative  INDICATION: meets SIRS criteria    TECHNIQUE: Portable chest radiograph    COMPARISON: Chest CT 26 January 2018, chest radiograph 24 August 2021    FINDINGS: The lungs are adequately inflated. No focal consolidation, effusion or  pneumothorax. Heart size within normal limits. Calcified pleural plaques are again noted.     No acute osseous abnormality. Impression  No acute cardiopulmonary findings. CT Results (most recent):  Results from Hospital Encounter encounter on 09/29/21    CT ABD PELV WO CONT    Narrative  CT ABDOMEN AND PELVIS UNENHANCED    CPT CODE: 74050 and 02400    INDICATION: Abdominal pain. Tachycardia noted in the cardiac Cath Lab.    TECHNIQUE: 5 mm collimation axial images obtained from the diaphragm to the  level of the pubic symphysis without intravenous contrast.    All CT scans at this facility are performed using dose optimization technique as  appropriate to this specific exam, to include automated exposure control,  adjustment of the mA and/or KP according to patient size or use of iterative  reconstruction techniques. COMPARISON: Prior CT 7/8/2021. ABDOMEN FINDINGS:  Mild COPD changes at the lung bases. Calcified granuloma right lower lobe. Lenticular atelectasis or scarring dependent left lung base adjacent to a tiny  pleural effusion. Scattered pleural-based calcification/granuloma. Lack of intravenous and oral contrast renders this study suboptimal for  evaluation of solid abdominal organs, vasculature and bowel. Liver not enlarged. No definite mass. Gallbladder is borderline distended, but contains no calcified stones. Pancreas significantly motion degraded. Not enlarged, with no regional  inflammation. Spleen unremarkable. Adrenal glands motion degraded but no mass identified. No hydronephrosis or calcified renal stones.  -Similar motion degraded hyperdense lobulated 1 cm lesion superior medial right  renal cortex.  -Similar 8mm hyperdense lateral right cortical renal lesion image 25.  -Similar subcentimeter cortical hypodensities anterior lateral on the left image  23. Abdominal aorta is nonaneurysmal. Distal thoracic aorta to the thoracoabdominal  junction is prominent measuring up to 3.1 cm oblique AP diameter, unchanged.     PELVIS FINDINGS:  No small bowel distention to suggest obstruction. Stomach is empty. Colon is not  distended. Cannot well assess for wall thickening or other abnormalities given  lack of distention and contrast. Subjective mild increased density in the  pericolonic mesenteric fat along the descending colon level of the pelvic inlet  image 58 for example, though significantly motion degraded. No definite  diverticuli regionally. Cannot exclude some regional inflammation. No free fluid in the pelvis. Prostate not enlarged. Bladder nearly empty and not well assessed. Similar heterogeneous density of the substance of the pelvic bones and sacrum,  with partial fusion across the SI joints. May reflect Paget's disease as appears  chronic. Similar mild superior endplate impression deformity of L2. Impression  1. Subjective mild degree of increased density/stranding pericolonic mid to  distal descending colon to proximal sigmoid levels, though noncontrast exam.  Cannot exclude colitis though colon is collapsed and not well assessed. Clinical  correlation needed. 2. Multiple bilateral hyperdense renal lesions may reflect hemorrhagic or  proteinaceous cyst. No hydronephrosis or stones. 3. Similar segmental enlargement of the distal thoracic aorta as previous. 4. Tiny left-sided pleural effusion new from previous. Calcified pleural and  diaphragmatic plaques.  -No bowel distention to suggest obstruction. 5. 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 Similar chronic degenerative change  and lumbar compression deformities.            Labs:    Recent Results (from the past 48 hour(s))   METABOLIC PANEL, BASIC    Collection Time: 09/29/21  8:07 AM   Result Value Ref Range    Sodium 148 (H) 136 - 145 mmol/L    Potassium 5.0 3.5 - 5.5 mmol/L    Chloride 117 (H) 100 - 111 mmol/L    CO2 15 (L) 21 - 32 mmol/L    Anion gap 16 3.0 - 18 mmol/L    Glucose 161 (H) 74 - 99 mg/dL    BUN 66 (H) 7.0 - 18 MG/DL    Creatinine 2.48 (H) 0.6 - 1.3 MG/DL    BUN/Creatinine ratio 27 (H) 12 - 20      GFR est AA 30 (L) >60 ml/min/1.73m2    GFR est non-AA 24 (L) >60 ml/min/1.73m2    Calcium 8.9 8.5 - 10.1 MG/DL   CBC W/O DIFF    Collection Time: 09/29/21  8:07 AM   Result Value Ref Range    WBC 14.0 (H) 4.6 - 13.2 K/uL    RBC 4.62 4.35 - 5.65 M/uL    HGB 12.7 (L) 13.0 - 16.0 g/dL    HCT 40.4 36.0 - 48.0 %    MCV 87.4 78.0 - 100.0 FL    MCH 27.5 24.0 - 34.0 PG    MCHC 31.4 31.0 - 37.0 g/dL    RDW 15.4 (H) 11.6 - 14.5 %    PLATELET 553 915 - 136 K/uL    MPV 10.4 9.2 - 11.8 FL   PROTHROMBIN TIME + INR    Collection Time: 09/29/21  8:07 AM   Result Value Ref Range    Prothrombin time 13.7 11.5 - 15.2 sec    INR 1.1 0.8 - 1.2     PTT    Collection Time: 09/29/21  8:07 AM   Result Value Ref Range    aPTT 28.1 23.0 - 36.4 SEC   CULTURE, BLOOD    Collection Time: 09/29/21  9:45 AM    Specimen: Blood   Result Value Ref Range    Special Requests: NO SPECIAL REQUESTS      Culture result: NO GROWTH AFTER 20 HOURS     CULTURE, BLOOD    Collection Time: 09/29/21 10:00 AM    Specimen: Blood   Result Value Ref Range    Special Requests: NO SPECIAL REQUESTS      Culture result: NO GROWTH AFTER 20 HOURS     METABOLIC PANEL, COMPREHENSIVE    Collection Time: 09/29/21 10:00 AM   Result Value Ref Range    Sodium 149 (H) 136 - 145 mmol/L    Potassium 4.8 3.5 - 5.5 mmol/L    Chloride 118 (H) 100 - 111 mmol/L    CO2 16 (L) 21 - 32 mmol/L    Anion gap 15 3.0 - 18 mmol/L    Glucose 139 (H) 74 - 99 mg/dL    BUN 64 (H) 7.0 - 18 MG/DL    Creatinine 2.29 (H) 0.6 - 1.3 MG/DL    BUN/Creatinine ratio 28 (H) 12 - 20      GFR est AA 32 (L) >60 ml/min/1.73m2    GFR est non-AA 27 (L) >60 ml/min/1.73m2    Calcium 8.5 8.5 - 10.1 MG/DL    Bilirubin, total 0.7 0.2 - 1.0 MG/DL    ALT (SGPT) 7 (L) 16 - 61 U/L    AST (SGOT) 20 10 - 38 U/L    Alk.  phosphatase 74 45 - 117 U/L    Protein, total 7.3 6.4 - 8.2 g/dL    Albumin 2.1 (L) 3.4 - 5.0 g/dL    Globulin 5.2 (H) 2.0 - 4.0 g/dL    A-G Ratio 0.4 (L) 0.8 - 1.7     CBC WITH AUTOMATED DIFF Collection Time: 09/29/21 10:00 AM   Result Value Ref Range    WBC 15.8 (H) 4.6 - 13.2 K/uL    RBC 4.29 (L) 4.35 - 5.65 M/uL    HGB 11.6 (L) 13.0 - 16.0 g/dL    HCT 38.1 36.0 - 48.0 %    MCV 88.8 78.0 - 100.0 FL    MCH 27.0 24.0 - 34.0 PG    MCHC 30.4 (L) 31.0 - 37.0 g/dL    RDW 15.3 (H) 11.6 - 14.5 %    PLATELET 750 178 - 811 K/uL    MPV 10.8 9.2 - 11.8 FL    NEUTROPHILS 86 (H) 40 - 73 %    LYMPHOCYTES 9 (L) 21 - 52 %    MONOCYTES 4 3 - 10 %    EOSINOPHILS 0 0 - 5 %    BASOPHILS 0 0 - 2 %    ABS. NEUTROPHILS 13.5 (H) 1.8 - 8.0 K/UL    ABS. LYMPHOCYTES 1.4 0.9 - 3.6 K/UL    ABS. MONOCYTES 0.7 0.05 - 1.2 K/UL    ABS. EOSINOPHILS 0.0 0.0 - 0.4 K/UL    ABS.  BASOPHILS 0.0 0.0 - 0.1 K/UL    DF AUTOMATED     TROPONIN I    Collection Time: 09/29/21 10:00 AM   Result Value Ref Range    Troponin-I, QT 0.07 (H) 0.0 - 0.045 NG/ML   LIPASE    Collection Time: 09/29/21 10:00 AM   Result Value Ref Range    Lipase 196 73 - 393 U/L   POC LACTIC ACID    Collection Time: 09/29/21 11:09 AM   Result Value Ref Range    Lactic Acid (POC) 2.95 (HH) 0.40 - 2.00 mmol/L   EKG, 12 LEAD, INITIAL    Collection Time: 09/29/21 11:21 AM   Result Value Ref Range    Ventricular Rate 117 BPM    Atrial Rate 117 BPM    P-R Interval 136 ms    QRS Duration 92 ms    Q-T Interval 358 ms    QTC Calculation (Bezet) 499 ms    Calculated P Axis 80 degrees    Calculated R Axis -33 degrees    Calculated T Axis 74 degrees    Diagnosis       Sinus tachycardia with premature atrial complexes with aberrant conduction  Left axis deviation  Low voltage QRS  Incomplete right bundle branch block  Cannot rule out Anterior infarct , age undetermined  Abnormal ECG    Confirmed by Kaylin Martins MD, Charlotte Pickard ([id number]) on 9/29/2021 11:51:47 AM     POC LACTIC ACID    Collection Time: 09/29/21  4:35 PM   Result Value Ref Range    Lactic Acid (POC) 1.23 0.40 - 2.00 mmol/L   VANCOMYCIN, RANDOM    Collection Time: 09/30/21  2:08 AM   Result Value Ref Range    Vancomycin, random 12.2 5.0 - 57.8 UG/ML   METABOLIC PANEL, COMPREHENSIVE    Collection Time: 09/30/21  2:08 AM   Result Value Ref Range    Sodium 152 (H) 136 - 145 mmol/L    Potassium 5.1 3.5 - 5.5 mmol/L    Chloride 127 (H) 100 - 111 mmol/L    CO2 18 (L) 21 - 32 mmol/L    Anion gap 7 3.0 - 18 mmol/L    Glucose 96 74 - 99 mg/dL    BUN 56 (H) 7.0 - 18 MG/DL    Creatinine 1.75 (H) 0.6 - 1.3 MG/DL    BUN/Creatinine ratio 32 (H) 12 - 20      GFR est AA 44 (L) >60 ml/min/1.73m2    GFR est non-AA 36 (L) >60 ml/min/1.73m2    Calcium 8.1 (L) 8.5 - 10.1 MG/DL    Bilirubin, total 0.4 0.2 - 1.0 MG/DL    ALT (SGPT) <6 (L) 16 - 61 U/L    AST (SGOT) 12 10 - 38 U/L    Alk. phosphatase 55 45 - 117 U/L    Protein, total 5.8 (L) 6.4 - 8.2 g/dL    Albumin 1.8 (L) 3.4 - 5.0 g/dL    Globulin 4.0 2.0 - 4.0 g/dL    A-G Ratio 0.5 (L) 0.8 - 1.7     CBC WITH AUTOMATED DIFF    Collection Time: 09/30/21  2:08 AM   Result Value Ref Range    WBC 8.6 4.6 - 13.2 K/uL    RBC 3.74 (L) 4.35 - 5.65 M/uL    HGB 10.2 (L) 13.0 - 16.0 g/dL    HCT 33.4 (L) 36.0 - 48.0 %    MCV 89.3 78.0 - 100.0 FL    MCH 27.3 24.0 - 34.0 PG    MCHC 30.5 (L) 31.0 - 37.0 g/dL    RDW 15.5 (H) 11.6 - 14.5 %    PLATELET 496 413 - 758 K/uL    MPV 10.6 9.2 - 11.8 FL    NEUTROPHILS 74 (H) 40 - 73 %    LYMPHOCYTES 16 (L) 21 - 52 %    MONOCYTES 8 3 - 10 %    EOSINOPHILS 1 0 - 5 %    BASOPHILS 0 0 - 2 %    ABS. NEUTROPHILS 6.4 1.8 - 8.0 K/UL    ABS. LYMPHOCYTES 1.4 0.9 - 3.6 K/UL    ABS. MONOCYTES 0.7 0.05 - 1.2 K/UL    ABS. EOSINOPHILS 0.1 0.0 - 0.4 K/UL    ABS. BASOPHILS 0.0 0.0 - 0.1 K/UL    DF AUTOMATED         Signed By: Ashkan Maria MD     September 30, 2021      I spent 25 minutes with the patient in face-to-face consultation, of which greater than 50% was spent in counseling and coordination of care as described above    Disclaimer: Sections of this note are dictated using utilizing voice recognition software. Minor typographical errors may be present.  If questions arise, please do not hesitate to contact me or call our department.

## 2021-10-01 ENCOUNTER — APPOINTMENT (OUTPATIENT)
Dept: GENERAL RADIOLOGY | Age: 86
DRG: 871 | End: 2021-10-01
Attending: HOSPITALIST
Payer: MEDICARE

## 2021-10-01 LAB
ALBUMIN SERPL-MCNC: 1.8 G/DL (ref 3.4–5)
ALBUMIN/GLOB SERPL: 0.5 {RATIO} (ref 0.8–1.7)
ALP SERPL-CCNC: 55 U/L (ref 45–117)
ALT SERPL-CCNC: <6 U/L (ref 16–61)
ANION GAP SERPL CALC-SCNC: 7 MMOL/L (ref 3–18)
AST SERPL-CCNC: 24 U/L (ref 10–38)
BASOPHILS # BLD: 0 K/UL (ref 0–0.1)
BASOPHILS NFR BLD: 0 % (ref 0–2)
BILIRUB SERPL-MCNC: 0.4 MG/DL (ref 0.2–1)
BUN SERPL-MCNC: 43 MG/DL (ref 7–18)
BUN/CREAT SERPL: 32 (ref 12–20)
CALCIUM SERPL-MCNC: 8.5 MG/DL (ref 8.5–10.1)
CHLORIDE SERPL-SCNC: 127 MMOL/L (ref 100–111)
CO2 SERPL-SCNC: 18 MMOL/L (ref 21–32)
CREAT SERPL-MCNC: 1.34 MG/DL (ref 0.6–1.3)
DIFFERENTIAL METHOD BLD: ABNORMAL
EOSINOPHIL # BLD: 0.1 K/UL (ref 0–0.4)
EOSINOPHIL NFR BLD: 1 % (ref 0–5)
ERYTHROCYTE [DISTWIDTH] IN BLOOD BY AUTOMATED COUNT: 15.4 % (ref 11.6–14.5)
GLOBULIN SER CALC-MCNC: 4 G/DL (ref 2–4)
GLUCOSE BLD STRIP.AUTO-MCNC: 117 MG/DL (ref 70–110)
GLUCOSE BLD STRIP.AUTO-MCNC: 117 MG/DL (ref 70–110)
GLUCOSE SERPL-MCNC: 110 MG/DL (ref 74–99)
HCT VFR BLD AUTO: 33 % (ref 36–48)
HGB BLD-MCNC: 10.1 G/DL (ref 13–16)
LYMPHOCYTES # BLD: 1.4 K/UL (ref 0.9–3.6)
LYMPHOCYTES NFR BLD: 20 % (ref 21–52)
MAGNESIUM SERPL-MCNC: 2.1 MG/DL (ref 1.6–2.6)
MCH RBC QN AUTO: 27.4 PG (ref 24–34)
MCHC RBC AUTO-ENTMCNC: 30.6 G/DL (ref 31–37)
MCV RBC AUTO: 89.7 FL (ref 78–100)
MONOCYTES # BLD: 0.3 K/UL (ref 0.05–1.2)
MONOCYTES NFR BLD: 5 % (ref 3–10)
NEUTS SEG # BLD: 5 K/UL (ref 1.8–8)
NEUTS SEG NFR BLD: 73 % (ref 40–73)
PHOSPHATE SERPL-MCNC: 2.3 MG/DL (ref 2.5–4.9)
PLATELET # BLD AUTO: 179 K/UL (ref 135–420)
PMV BLD AUTO: 11.3 FL (ref 9.2–11.8)
POTASSIUM SERPL-SCNC: 4.7 MMOL/L (ref 3.5–5.5)
PROT SERPL-MCNC: 5.8 G/DL (ref 6.4–8.2)
RBC # BLD AUTO: 3.68 M/UL (ref 4.35–5.65)
SODIUM SERPL-SCNC: 152 MMOL/L (ref 136–145)
VANCOMYCIN SERPL-MCNC: 14.8 UG/ML (ref 5–40)
WBC # BLD AUTO: 6.9 K/UL (ref 4.6–13.2)

## 2021-10-01 PROCEDURE — 2709999900 HC NON-CHARGEABLE SUPPLY

## 2021-10-01 PROCEDURE — 74011250636 HC RX REV CODE- 250/636: Performed by: HOSPITALIST

## 2021-10-01 PROCEDURE — 85025 COMPLETE CBC W/AUTO DIFF WBC: CPT

## 2021-10-01 PROCEDURE — 77010033678 HC OXYGEN DAILY

## 2021-10-01 PROCEDURE — 82962 GLUCOSE BLOOD TEST: CPT

## 2021-10-01 PROCEDURE — 99232 SBSQ HOSP IP/OBS MODERATE 35: CPT | Performed by: HOSPITALIST

## 2021-10-01 PROCEDURE — 84100 ASSAY OF PHOSPHORUS: CPT

## 2021-10-01 PROCEDURE — 83735 ASSAY OF MAGNESIUM: CPT

## 2021-10-01 PROCEDURE — 74011250637 HC RX REV CODE- 250/637: Performed by: HOSPITALIST

## 2021-10-01 PROCEDURE — 74011000250 HC RX REV CODE- 250: Performed by: HOSPITALIST

## 2021-10-01 PROCEDURE — 80053 COMPREHEN METABOLIC PANEL: CPT

## 2021-10-01 PROCEDURE — 74018 RADEX ABDOMEN 1 VIEW: CPT

## 2021-10-01 PROCEDURE — 99231 SBSQ HOSP IP/OBS SF/LOW 25: CPT | Performed by: NURSE PRACTITIONER

## 2021-10-01 PROCEDURE — 80202 ASSAY OF VANCOMYCIN: CPT

## 2021-10-01 PROCEDURE — 65660000000 HC RM CCU STEPDOWN

## 2021-10-01 PROCEDURE — 36415 COLL VENOUS BLD VENIPUNCTURE: CPT

## 2021-10-01 PROCEDURE — 74011000258 HC RX REV CODE- 258: Performed by: HOSPITALIST

## 2021-10-01 RX ADMIN — Medication 10 ML: at 05:47

## 2021-10-01 RX ADMIN — TIMOLOL MALEATE 1 DROP: 5 SOLUTION OPHTHALMIC at 09:03

## 2021-10-01 RX ADMIN — CEFEPIME HYDROCHLORIDE 1 G: 1 INJECTION, POWDER, FOR SOLUTION INTRAMUSCULAR; INTRAVENOUS at 09:03

## 2021-10-01 RX ADMIN — VANCOMYCIN HYDROCHLORIDE 750 MG: 750 INJECTION, POWDER, LYOPHILIZED, FOR SOLUTION INTRAVENOUS at 09:02

## 2021-10-01 RX ADMIN — THIAMINE HYDROCHLORIDE 200 MG: 100 INJECTION, SOLUTION INTRAMUSCULAR; INTRAVENOUS at 09:02

## 2021-10-01 RX ADMIN — TIMOLOL MALEATE 1 DROP: 5 SOLUTION OPHTHALMIC at 17:56

## 2021-10-01 RX ADMIN — DEXTROSE MONOHYDRATE AND SODIUM CHLORIDE 75 ML/HR: 5; .45 INJECTION, SOLUTION INTRAVENOUS at 08:15

## 2021-10-01 RX ADMIN — Medication 10 ML: at 22:00

## 2021-10-01 RX ADMIN — HEPARIN SODIUM 5000 UNITS: 5000 INJECTION INTRAVENOUS; SUBCUTANEOUS at 22:14

## 2021-10-01 RX ADMIN — Medication 30 ML: at 09:01

## 2021-10-01 RX ADMIN — HEPARIN SODIUM 5000 UNITS: 5000 INJECTION INTRAVENOUS; SUBCUTANEOUS at 05:43

## 2021-10-01 RX ADMIN — HEPARIN SODIUM 5000 UNITS: 5000 INJECTION INTRAVENOUS; SUBCUTANEOUS at 14:24

## 2021-10-01 RX ADMIN — Medication 10 ML: at 14:24

## 2021-10-01 NOTE — ROUTINE PROCESS
Charge Nurse phoned radiology to confirm placement of NGT. No return call. Two nurses able to audibly hear air for placement loud and clear. Nepro tube feed initiated.

## 2021-10-01 NOTE — PROGRESS NOTES
0720: Bedside shift change report given to Usman Espinosa RN (oncoming nurse) by Ted Blackwood RN (offgoing nurse). Report included the following information SBAR, Kardex, Intake/Output, MAR and Cardiac Rhythm NSR.

## 2021-10-01 NOTE — PROGRESS NOTES
Palliative Medicine  [facility] [phone]  [facility] ED: [address] [place] [address] Way: [phone]    Patient Name: Betito Garrison  YOB: 1927    Date of follow up 10/1/2021   Reason for Consult: care decisions  Requesting Provider: Dr Owen Oliver  Primary Care Physician: Darlene Ruiz MD      SUMMARY:   Betito Garrison is a 80y.o. year old with a past history of dementia, CKD 3, dysphagia has PEG tube , who was admitted on 9/29/2021 from home  with a diagnosis of colitis. Mr Claudia Farrell presented for a planned PEG tube replacement however prior to procedure he became hypotensive and tachycardiac. Found to be septic secondary to possible colitis. Current medical issues leading to Palliative Medicine involvement include: 80year old male who is known to our team from previous admissions. Very thin, chronically ill appearing elderly gentleman. Palliative medicine is consulted for support and care decisions. 10/1/2021   Eyes closed resting comfortably, NGT present. PALLIATIVE DIAGNOSES:   1. Goals of care / care decisions   2. Sepsis   3. Colitis   4. Dementia   5. Debility        PLAN:   1. 10/1/2021 Patient seen at bedside. Very calm and comfortable appearing. NGT present. No family at bedside. Patient is not able to participate in his medical decisions. Spoke with son yesterday Mr Matthew Daniels who is also MPOA. He wishes to have PEG replaced to give one last try to have some recovery he is aware enough with PEG tube feeds, his father may not recover to the extent he is hopeful. Goals of care DNR/DNI limited interventions, long term feeding tube. We will continue to follow with you for additional care decisions. ( please see previous notes per palliative team)     2. Goals of care / care decisions Mr Claudia Farrell seen along with Palliative team. He was alert, [place], confused. Not able to participate in his medical decisions.  Noted AMD on file naming his son Beau Syed. Call to Mr Radha Barriga. Affirmed goals of care DNR/DNI limited interventions, family wishes for long term feeding tube as a last \"ditch effort \" to see if he has any recovery. We did share, even with GT feeds very thin, likely not absorbing all the feeds and GT feeds are unlikely to change course of his illness and dementia. Goals of care DNR/DNI limited interventions, long term feeding options. POST sent to Mr Radha Barriga for docu signature   3. Sepsis on IVAB likely secondary to colitis   4. Colitis on IVAB; denies abdominal pain   5. Dementia at baseline, verbal recognizes his family. FAST score likely 6C  6. Debility per son mostly chair bed bound, weak PPS 50 indicating mostly chair bound    7. Initial consult note routed to primary continuity provider  8. Communicated plan of care with: Palliative IDT, son       Patient/Health Care Proxy Stated Goals: Prolong life      TREATMENT PREFERENCES:   Code Status: DNR/ DNI     Advance Care Planning:  [] [facility] Interdisciplinary Team has updated the ACP Navigator with [address] and Patient Capacity    Primary Decision Maker ([address]):     Medical Interventions: Limited additional interventionsAMD on file naming his son Beau Syed as MPOA   Son Rob Oglesby as secondary MPOA  Artificially Administered Nutrition: Feeding tube long-term, if indicated     Other:  As far as possible, the palliative care team has discussed with patient / health care proxy about goals of care / treatment preferences for patient.      HISTORY:     History obtained from: chart and son     CHIEF COMPLAINT: sepsis     HPI/SUBJECTIVE:    The patient is:   [] Verbal and participatory  [x] Non-participatory due to:  Confusion   Please see summary     Clinical Pain Assessment (nonverbal scale for nonverbal patients): Clinical Pain Assessment  Severity: 0          Duration: for how long has pt been experiencing pain (e.g., 2 days, 1 month, years)  Frequency: how often pain is an issue (e.g., several times per day, once every few days, constant)     FUNCTIONAL ASSESSMENT:     Palliative Performance Scale (PPS):  PPS: 40    ECOG  ECOG Status : Completely disabled     PSYCHOSOCIAL/SPIRITUAL SCREENING:      Any spiritual / Moravian concerns:unable to assess for patient   [] Yes /  [] No    Caregiver Burnout:  [] Yes /  [] No /  [x] No Caregiver Present      Anticipatory grief assessment:  Unable to assess for patient   [] Normal  / [] Maladaptive        REVIEW OF SYSTEMS:     Positive and pertinent negative findings in ROS are noted above in HPI. The following systems were [] reviewed / [x] unable to be reviewed as noted in HPI  Other findings are noted below. Systems: constitutional, ears/nose/mouth/throat, respiratory, gastrointestinal, genitourinary, musculoskeletal, integumentary, neurologic, psychiatric, endocrine. Positive findings noted below. Modified ESAS Completed by: provider   Fatigue: 5 Drowsiness: 0     Pain: 0   Anxiety: 0       Dyspnea: 0           Stool Occurrence(s): 1        PHYSICAL EXAM:     Wt Readings from Last 3 Encounters:   09/30/21 50 kg (110 lb 4.8 oz)   09/29/21 48.1 kg (106 lb)   08/31/21 48.1 kg (106 lb)     Blood pressure 96/66, pulse (!) 110, temperature 98.1 °F (36.7 °C), resp. rate 20, height 5' 9\" (1.753 m), weight 50 kg (110 lb 4.8 oz), SpO2 100 %.   Pain:  Pain Scale 1: Numeric (0 - 10)  Pain Intensity 1: 0                 Last bowel movement: x 1 9/30/2021     Constitutional: lying in bed NGT present, eyes closed appears to be resting comfortably    Cardiovascular tachycardia   Respiratory: respirations not labored   Gastrointestinal: soft   Skin: warm, dry   Neurologic: eyes closed calm, comfortable did not awaken          HISTORY:     Active Problems:    Severe protein-calorie malnutrition ([facility] [address] (8/14/2021)      Colitis (9/29/2021)      [facility] (9/29/[facility])      Past Medical History:   Diagnosis Date  Arthritis     Back pain, chronic       Past Surgical History:   Procedure Laterality Date    HX HEENT      relieved pressure R eye 1/2014      Family History   Problem Relation Age of Onset    Diabetes Father     No Known Problems Mother      History reviewed, no pertinent family history.   Social History     Tobacco Use    Smoking status: Never Smoker    Smokeless tobacco: Never Used   Substance Use Topics    Alcohol use: No     Alcohol/week: 0.8 standard drinks     Types: 1 Standard drinks or equivalent per week     No Known Allergies   Current Facility-Administered Medications   Medication Dose Route Frequency    dextrose 5 % - 0.45% NaCl infusion  75 mL/hr IntraVENous CONTINUOUS    multivit-folic acid-herbal 652 (WELLESSE PLUS) oral liquid 30 mL  30 mL Per NG tube DAILY    thiamine (B-1) 200 mg in 0.9% sodium chloride 50 mL IVPB  200 mg IntraVENous DAILY    sodium chloride (NS) flush 5-10 mL  5-10 mL IntraVENous PRN    cefepime (MAXIPIME) 1 g in sterile water (preservative free) 10 mL IV syringe  1 g IntraVENous Q24H    VANCOMYCIN INFORMATION NOTE   Other Rx Dosing/Monitoring    sodium chloride (NS) flush 5-40 mL  5-40 mL IntraVENous Q8H    sodium chloride (NS) flush 5-40 mL  5-40 mL IntraVENous PRN    acetaminophen (TYLENOL) tablet 650 mg  650 mg Oral Q6H PRN    Or    acetaminophen (TYLENOL) suppository 650 mg  650 mg Rectal Q6H PRN    polyethylene glycol (MIRALAX) packet 17 g  17 g Oral DAILY PRN    ondansetron (ZOFRAN ODT) tablet 4 mg  4 mg Oral Q8H PRN    Or    ondansetron (ZOFRAN) injection 4 mg  4 mg IntraVENous Q6H PRN    heparin (porcine) injection 5,000 Units  5,000 Units SubCUTAneous Q8H    timolol (TIMOPTIC) 0.5 % ophthalmic solution 1 Drop  1 Drop Both Eyes BID        LAB AND IMAGING FINDINGS:     Lab Results   Component Value Date/Time    WBC 6.9 10/01/2021 04:05 AM    HGB 10.1 (L) 10/01/2021 04:05 AM    PLATELET 952 42/39/0239 04:05 AM     Lab Results   Component Value Date/Time    Sodium 152 (H) 10/01/2021 04:05 AM    Potassium 4.7 10/01/2021 04:05 AM    Chloride 127 (H) 10/01/2021 04:05 AM    CO2 18 (L) 10/01/2021 04:05 AM    BUN 43 (H) 10/01/2021 04:05 AM    Creatinine 1.34 (H) 10/01/2021 04:05 AM    Calcium 8.5 10/01/2021 04:05 AM    Magnesium 2.1 10/01/2021 04:05 AM    Phosphorus 2.3 (L) 10/01/2021 04:05 AM      Lab Results   Component Value Date/Time    Alk. phosphatase 55 10/01/2021 04:05 AM    Protein, total 5.8 (L) 10/01/2021 04:05 AM    Albumin 1.8 (L) 10/01/2021 04:05 AM    Globulin 4.0 10/01/2021 04:05 AM     Lab Results   Component Value Date/Time    INR 1.1 09/29/2021 08:07 AM    Prothrombin time 13.7 09/29/2021 08:07 AM    aPTT 28.1 09/29/2021 08:07 AM      Lab Results   Component Value Date/Time    Iron 25 (L) 04/20/2013 03:15 AM    TIBC 182 (L) 04/20/2013 03:15 AM    Iron % saturation 14 (L) 04/20/2013 03:15 AM      No results found for: PH, PCO2, PO2  No components found for: [place]   Lab Results   Component Value Date/Time     08/13/2021 04:20 PM    CK - MB <1.0 08/13/2021 04:20 PM              Total time: 15 minutes   Counseling / coordination time, spent as noted above:   > 50% counseling / coordination yes     Prolonged service was provided for  []30 min   []75 min in face to face time in the presence of the patient, spent as noted above.   Time Start:   Time End:

## 2021-10-01 NOTE — PROGRESS NOTES
Nutrition Note    NGT placed overnight and tube feeding started at trickle rate- paused today and NGT advanced 5 cm per KUB recommendation. Repeat KUB read pending- plan to resume feeds and increase water flushes per discussion with RN and MD. BG WNL. Hypernatremia, sodium 152 on IVF (D5 1/2 at 75 mL/hr to provide 90 gm dextrose, 306 kcal per day). Pt c/o thirst, denies nausea/vomiting no abdominal pain. Palliative following. Last BM 9/30. Recommendations/Plan   - Once NGT position confirmed, resume tube feeding of Nepro at 20 mL/hr and advance as tolerated by 10 mL q 8 hours to goal rate of 45 mL/hr with 50 mL q 1 hour water flushes (goal regimen to provide 1944 kcal, 87 gm protein, 783 mL free water, 100% RDIs). - Monitor and replace electrolytes as needed due to concern for refeeding syndrome.  Continue thiamine and multivitamin.   - IVF per MD.     Electronically signed by Sohan Saucedo RD, [address]  on 10/1/2021 at 2:21 PM    Contact: [phone]

## 2021-10-01 NOTE — PROGRESS NOTES
conducted an initial consultation and Spiritual Assessment for Ilene Reyes, who is a 80 y.o.,male. Patients Primary Language is: [place]. According to the patients EMR Hoahaoism Affiliation is: [facility].     The reason the Patient came to the hospital is:   Patient Active Problem List    Diagnosis Date Noted    Colitis 09/29/2021    Sepsis ([facility] [address] 09/29/2021    Severe protein-calorie malnutrition ([facility] [address] 08/14/2021    Altered mental status 08/13/2021    Failure to thrive in adult 08/13/2021    Pneumonia 01/25/2018    Community acquired pneumonia 01/25/2018    Chronic low back pain without sciatica 09/08/2016    Chronic low back pain 11/22/2014    Abdominal pain, other specified site 04/19/2013    Anemia, unspecified 04/19/2013    Backache, unspecified 04/19/2013    Renal failure, acute ([address] 04/19/2013    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 Offered prayer and assurance of continued prayers on patient's behalf. Chart reviewed. The following outcomes where achieved:  Patient already has a POST and Advance Medical Directive in the chart. Assessment:  Patient is comfortably resting. No spiritual concerns. Plan:  Chaplains will continue to follow and will provide pastoral care on an as needed/requested basis.  recommends bedside caregivers page  on duty if patient shows signs of acute spiritual or emotional distress.     [address]  [phone])

## 2021-10-01 NOTE — PROGRESS NOTES
[facility]  [facility]    Patient: Sukhi Mcpherson Age: 80 y.o. : 3/14/1927 MR#: [id number] SSN: [id number]  Date/Time: 10/1/2021     Subjective:     Patient seen and evaluated, lying in bed, no acute distress. No new overnight events. Discussed with palliative care, reached out to son who wishes to continue current treatment and patient will undergo PEG tube placement when he is more stable and plan is to discharge home with home health  Continue tube feeding with NGT        Assessment/Plan:     1. Sepsis secondary to possible colitiscontinue broad-spectrum IV antibiotics. 2. JULIET on CKD 3 continue IVF, monitor creatinine, improving  3. Tachycardia likely secondary to #1continue gentle hydration  4. History of baseline dementia  5. History of dysphagiapatient has a PEG tube in place and presented to interventional radiology to get PEG tube replaced since it appeared to be clogged. We will replace PEG tube when patient is more stable to undergo procedure. Patient has been started on tube feeding, continue per nutrition. 10. Advanced age with failure to thrive    DVT prophylaxisHeparin  DNR/DNR    Discussed with palliative care, they reached out to son who mentions that he would like to continue current treatment plan and get a replacement on the patient's PEG tube when he is stable. Plan will be to discharge patient home with home health.                 Ronni Velásquez MD  10/01/21      Case discussed with:  []Patient  []Family  []Nursing  []Case Management  DVT Prophylaxis:  []Lovenox  []Hep SQ  []SCDs  []Coumadin   []On Heparin gtt    Objective:   VS:   Visit Vitals  BP 96/66 (BP 1 Location: Left upper arm, BP Patient Position: At rest)   Pulse (!) 110   Temp 98.1 °F (36.7 °C)   Resp 20   Ht 5' 9\" (1.753 m)   Wt 50 kg (110 lb 4.8 oz)   SpO2 100%   BMI 16.29 kg/m²      Tmax/24hrs: Temp (24hrs), Av.9 °F (36.6 °C), Min:97.5 °F (36.4 °C), Max:98.2 °F (36.8 °C)  IOBRIEF    Intake/Output Summary (Last 24 hours) at 10/1/2021 1449  Last data filed at 10/1/2021 0523  Gross per 24 hour   Intake    Output 450 ml   Net -450 ml       General: Lying in bed, no acute distress  HEENT: PERRLA, anicteric sclerae. Pulmonary:  CTA Bilaterally. No Wheezing/Rhonchi/Rales. Cardiovascular: Regular rate and Rhythm. GI:  Soft, Non distended, Non tender. + Bowel sounds. Extremities:  No edema, cyanosis, clubbing. No calf tenderness.    Neurologic: Unable to perform neurological examination  Additional:    Medications:   Current Facility-Administered Medications   Medication Dose Route Frequency    dextrose 5 % - 0.45% NaCl infusion  75 mL/hr IntraVENous CONTINUOUS    multivit-folic acid-herbal 895 (WELLESSE PLUS) oral liquid 30 mL  30 mL Per NG tube DAILY    thiamine (B-1) 200 mg in 0.9% sodium chloride 50 mL IVPB  200 mg IntraVENous DAILY    sodium chloride (NS) flush 5-10 mL  5-10 mL IntraVENous PRN    cefepime (MAXIPIME) 1 g in sterile water (preservative free) 10 mL IV syringe  1 g IntraVENous Q24H    VANCOMYCIN INFORMATION NOTE   Other Rx Dosing/Monitoring    sodium chloride (NS) flush 5-40 mL  5-40 mL IntraVENous Q8H    sodium chloride (NS) flush 5-40 mL  5-40 mL IntraVENous PRN    acetaminophen (TYLENOL) tablet 650 mg  650 mg Oral Q6H PRN    Or    acetaminophen (TYLENOL) suppository 650 mg  650 mg Rectal Q6H PRN    polyethylene glycol (MIRALAX) packet 17 g  17 g Oral DAILY PRN    ondansetron (ZOFRAN ODT) tablet 4 mg  4 mg Oral Q8H PRN    Or    ondansetron (ZOFRAN) injection 4 mg  4 mg IntraVENous Q6H PRN    heparin (porcine) injection 5,000 Units  5,000 Units SubCUTAneous Q8H    timolol (TIMOPTIC) 0.5 % ophthalmic solution 1 Drop  1 Drop Both Eyes BID       Imaging:   XR Results (most recent):  Results from Hospital Encounter encounter on 09/29/21    XR ABD (KUB)    Narrative  Examination: Supine abdomen, one view    History: NG tube placement    Comparison: CT from one day prior    Findings: NG tube with the side port near the GE junction. No definite  obstruction. Patchy opacities are seen in the lungs and appear calcific density. 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 Impression  1. NG tube with the side port near the GE junction. Recommend advancing by 5 cm  for optimal positioning. CT Results (most recent):  Results from Hospital Encounter encounter on 09/29/21    CT ABD PELV WO CONT    Narrative  CT ABDOMEN AND PELVIS UNENHANCED    CPT CODE: 36901 and 33741    INDICATION: Abdominal pain. Tachycardia noted in the cardiac Cath Lab.    TECHNIQUE: 5 mm collimation axial images obtained from the diaphragm to the  level of the pubic symphysis without intravenous contrast.    All CT scans at this facility are performed using dose optimization technique as  appropriate to this specific exam, to include automated exposure control,  adjustment of the mA and/or KP according to patient size or use of iterative  reconstruction techniques. COMPARISON: Prior CT 7/8/2021. ABDOMEN FINDINGS:  Mild COPD changes at the lung bases. Calcified granuloma right lower lobe. Lenticular atelectasis or scarring dependent left lung base adjacent to a tiny  pleural effusion. Scattered pleural-based calcification/granuloma. Lack of intravenous and oral contrast renders this study suboptimal for  evaluation of solid abdominal organs, vasculature and bowel. Liver not enlarged. No definite mass. Gallbladder is borderline distended, but contains no calcified stones. Pancreas significantly motion degraded. Not enlarged, with no regional  inflammation. Spleen unremarkable. Adrenal glands motion degraded but no mass identified.     No hydronephrosis or calcified renal stones.  -Similar motion degraded hyperdense lobulated 1 cm lesion superior medial right  renal cortex.  -Similar 8mm hyperdense lateral right cortical renal lesion image 25.  -Similar subcentimeter cortical hypodensities anterior lateral on the left image  23. Abdominal aorta is nonaneurysmal. Distal thoracic aorta to the thoracoabdominal  junction is prominent measuring up to 3.1 cm oblique AP diameter, unchanged. PELVIS FINDINGS:  No small bowel distention to suggest obstruction. Stomach is empty. Colon is not  distended. Cannot well assess for wall thickening or other abnormalities given  lack of distention and contrast. Subjective mild increased density in the  pericolonic mesenteric fat along the descending colon level of the pelvic inlet  image 58 for example, though significantly motion degraded. No definite  diverticuli regionally. Cannot exclude some regional inflammation. No free fluid in the pelvis. Prostate not enlarged. Bladder nearly empty and not well assessed. Similar heterogeneous density of the substance of the pelvic bones and sacrum,  with partial fusion across the SI joints. May reflect Paget's disease as appears  chronic. Similar mild superior endplate impression deformity of L2. Impression  1. Subjective mild degree of increased density/stranding pericolonic mid to  distal descending colon to proximal sigmoid levels, though noncontrast exam.  Cannot exclude colitis though colon is collapsed and not well assessed. Clinical  correlation needed. 2. Multiple bilateral hyperdense renal lesions may reflect hemorrhagic or  proteinaceous cyst. No hydronephrosis or stones. 3. Similar segmental enlargement of the distal thoracic aorta as previous. 4. Tiny left-sided pleural effusion new from previous. Calcified pleural and  diaphragmatic plaques.  -No bowel distention to suggest obstruction. 5. 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 Similar chronic degenerative change  and lumbar compression deformities.            Labs:    Recent Results (from the past 48 hour(s))   POC LACTIC ACID    Collection Time: 09/29/21  4:35 PM   Result Value Ref Range    Lactic Acid (POC) 1. 23 0.40 - 2.00 mmol/L   VANCOMYCIN, RANDOM    Collection Time: 09/30/21  2:08 AM   Result Value Ref Range    Vancomycin, random 12.2 5.0 - 22.0 UG/ML   METABOLIC PANEL, COMPREHENSIVE    Collection Time: 09/30/21  2:08 AM   Result Value Ref Range    Sodium 152 (H) 136 - 145 mmol/L    Potassium 5.1 3.5 - 5.5 mmol/L    Chloride 127 (H) 100 - 111 mmol/L    CO2 18 (L) 21 - 32 mmol/L    Anion gap 7 3.0 - 18 mmol/L    Glucose 96 74 - 99 mg/dL    BUN 56 (H) 7.0 - 18 MG/DL    Creatinine 1.75 (H) 0.6 - 1.3 MG/DL    BUN/Creatinine ratio 32 (H) 12 - 20      GFR est AA 44 (L) >60 ml/min/1.73m2    GFR est non-AA 36 (L) >60 ml/min/1.73m2    Calcium 8.1 (L) 8.5 - 10.1 MG/DL    Bilirubin, total 0.4 0.2 - 1.0 MG/DL    ALT (SGPT) <6 (L) 16 - 61 U/L    AST (SGOT) 12 10 - 38 U/L    Alk. phosphatase 55 45 - 117 U/L    Protein, total 5.8 (L) 6.4 - 8.2 g/dL    Albumin 1.8 (L) 3.4 - 5.0 g/dL    Globulin 4.0 2.0 - 4.0 g/dL    A-G Ratio 0.5 (L) 0.8 - 1.7     CBC WITH AUTOMATED DIFF    Collection Time: 09/30/21  2:08 AM   Result Value Ref Range    WBC 8.6 4.6 - 13.2 K/uL    RBC 3.74 (L) 4.35 - 5.65 M/uL    HGB 10.2 (L) 13.0 - 16.0 g/dL    HCT 33.4 (L) 36.0 - 48.0 %    MCV 89.3 78.0 - 100.0 FL    MCH 27.3 24.0 - 34.0 PG    MCHC 30.5 (L) 31.0 - 37.0 g/dL    RDW 15.5 (H) 11.6 - 14.5 %    PLATELET 419 265 - 122 K/uL    MPV 10.6 9.2 - 11.8 FL    NEUTROPHILS 74 (H) 40 - 73 %    LYMPHOCYTES 16 (L) 21 - 52 %    MONOCYTES 8 3 - 10 %    EOSINOPHILS 1 0 - 5 %    BASOPHILS 0 0 - 2 %    ABS. NEUTROPHILS 6.4 1.8 - 8.0 K/UL    ABS. LYMPHOCYTES 1.4 0.9 - 3.6 K/UL    ABS. MONOCYTES 0.7 0.05 - 1.2 K/UL    ABS. EOSINOPHILS 0.1 0.0 - 0.4 K/UL    ABS.  BASOPHILS 0.0 0.0 - 0.1 K/UL    DF AUTOMATED     METABOLIC PANEL, COMPREHENSIVE    Collection Time: 10/01/21  4:05 AM   Result Value Ref Range    Sodium 152 (H) 136 - 145 mmol/L    Potassium 4.7 3.5 - 5.5 mmol/L    Chloride 127 (H) 100 - 111 mmol/L    CO2 18 (L) 21 - 32 mmol/L    Anion gap 7 3.0 - 18 mmol/L Glucose 110 (H) 74 - 99 mg/dL    BUN 43 (H) 7.0 - 18 MG/DL    Creatinine 1.34 (H) 0.6 - 1.3 MG/DL    BUN/Creatinine ratio 32 (H) 12 - 20      GFR est AA >60 >60 ml/min/1.73m2    GFR est non-AA 50 (L) >60 ml/min/1.73m2    Calcium 8.5 8.5 - 10.1 MG/DL    Bilirubin, total 0.4 0.2 - 1.0 MG/DL    ALT (SGPT) <6 (L) 16 - 61 U/L    AST (SGOT) 24 10 - 38 U/L    Alk. phosphatase 55 45 - 117 U/L    Protein, total 5.8 (L) 6.4 - 8.2 g/dL    Albumin 1.8 (L) 3.4 - 5.0 g/dL    Globulin 4.0 2.0 - 4.0 g/dL    A-G Ratio 0.5 (L) 0.8 - 1.7     CBC WITH AUTOMATED DIFF    Collection Time: 10/01/21  4:05 AM   Result Value Ref Range    WBC 6.9 4.6 - 13.2 K/uL    RBC 3.68 (L) 4.35 - 5.65 M/uL    HGB 10.1 (L) 13.0 - 16.0 g/dL    HCT 33.0 (L) 36.0 - 48.0 %    MCV 89.7 78.0 - 100.0 FL    MCH 27.4 24.0 - 34.0 PG    MCHC 30.6 (L) 31.0 - 37.0 g/dL    RDW 15.4 (H) 11.6 - 14.5 %    PLATELET 974 164 - 993 K/uL    MPV 11.3 9.2 - 11.8 FL    NEUTROPHILS 73 40 - 73 %    LYMPHOCYTES 20 (L) 21 - 52 %    MONOCYTES 5 3 - 10 %    EOSINOPHILS 1 0 - 5 %    BASOPHILS 0 0 - 2 %    ABS. NEUTROPHILS 5.0 1.8 - 8.0 K/UL    ABS. LYMPHOCYTES 1.4 0.9 - 3.6 K/UL    ABS. MONOCYTES 0.3 0.05 - 1.2 K/UL    ABS. EOSINOPHILS 0.1 0.0 - 0.4 K/UL    ABS.  BASOPHILS 0.0 0.0 - 0.1 K/UL    DF AUTOMATED     VANCOMYCIN, RANDOM    Collection Time: 10/01/21  4:05 AM   Result Value Ref Range    Vancomycin, random 14.8 5.0 - 40.0 UG/ML   PHOSPHORUS    Collection Time: 10/01/21  4:05 AM   Result Value Ref Range    Phosphorus 2.3 (L) 2.5 - 4.9 MG/DL   MAGNESIUM    Collection Time: 10/01/21  4:05 AM   Result Value Ref Range    Magnesium 2.1 1.6 - 2.6 mg/dL   GLUCOSE, POC    Collection Time: 10/01/21 12:08 PM   Result Value Ref Range    Glucose (POC) 117 (H) 70 - 110 mg/dL       Signed By: Staci Weller MD     October 1, 2021      I spent 25 minutes with the patient in face-to-face consultation, of which greater than 50% was spent in counseling and coordination of care as described above    Disclaimer: Sections of this note are dictated using utilizing voice recognition software. Minor typographical errors may be present. If questions arise, please do not hesitate to contact me or call our department.

## 2021-10-01 NOTE — PROGRESS NOTES
Physician Progress Note      PATIENT:               Rinku Edgar  CSN #:                  [id number]  :                       3/14/1927  ADMIT DATE:       2021 9:10 AM  [address] [place] DATE:  RESPONDING  PROVIDER #:        Molly Montana MD          QUERY TEXT:    Dear Hospitalist,    Pt admitted with Sepsis. Noted documentation of Severe Malnutrition in RD Consult note dated . If possible, please document in progress notes and discharge summary:    The medical record reflects the following:  Risk Factors: BMI 15.65, Aphasia, G-tube    Clinical Indicators: RD Malnutrition Assessment   Malnutrition Status:  Severe malnutrition  - Context:  Chronic illness  - Findings of the 6 clinical characteristics of malnutrition:  *    Energy Intake:  7 - 75% or less est energy requirements for 1 month or longer  *   Weight Loss:  7.000 - Greater than 20% over 1 year    Treatment: Per RD Nutrition Recommendations/Plan:  - Once NGT placement confirmed, initiate tube feeding of Nepro at 10 mL/hr and advance as tolerated by 10 mL q 8 hours to goal rate of 45 mL/hr with 150 mL q 4 hour water flushes (goal regimen to provide 1944 kcal, 87 gm protein, 783 mL free water, 100% RDIs). - Monitor and replace electrolytes as needed due to concern for refeeding syndrome. Add thiamine and multivitamin.  - IVF per MD.    Thank you,  Jaun CUEVASN, RN, CRCR  Please contact me for any questions or concerns regarding this query at [email or web address]  Options provided:  -- Severe Malnutrition confirmed present on admission  -- Severe malnutrition ruled out  -- Other - I will add my own diagnosis  -- Disagree - Not applicable / Not valid  -- Disagree - Clinically unable to determine / Unknown  -- Refer to Clinical Documentation Reviewer    PROVIDER RESPONSE TEXT:    The diagnosis of severe malnutrition is confirmed as present on admission.     Query created by: Derrick Dillon on 2021 4:38 PM      Electronically signed by:   [facility] MD [facility] MD 10/1/2021 11:57 AM

## 2021-10-01 NOTE — PROGRESS NOTES
Bedside shift change report given to Cheri Haney (oncoming nurse) by Sarah Gallardo RN (offgoing nurse). Report included the following information SBAR, Kardex, Procedure Summary, Intake/Output, MAR and Cardiac Rhythm NSR.

## 2021-10-02 LAB
ALBUMIN SERPL-MCNC: 1.6 G/DL (ref 3.4–5)
ALBUMIN/GLOB SERPL: 0.4 {RATIO} (ref 0.8–1.7)
ALP SERPL-CCNC: 56 U/L (ref 45–117)
ALT SERPL-CCNC: 10 U/L (ref 16–61)
ANION GAP SERPL CALC-SCNC: 8 MMOL/L (ref 3–18)
AST SERPL-CCNC: 24 U/L (ref 10–38)
BASOPHILS # BLD: 0 K/UL (ref 0–0.1)
BASOPHILS NFR BLD: 1 % (ref 0–2)
BILIRUB SERPL-MCNC: 0.5 MG/DL (ref 0.2–1)
BUN SERPL-MCNC: 31 MG/DL (ref 7–18)
BUN/CREAT SERPL: 25 (ref 12–20)
CALCIUM SERPL-MCNC: 8.2 MG/DL (ref 8.5–10.1)
CHLORIDE SERPL-SCNC: 126 MMOL/L (ref 100–111)
CO2 SERPL-SCNC: 17 MMOL/L (ref 21–32)
CREAT SERPL-MCNC: 1.25 MG/DL (ref 0.6–1.3)
DIFFERENTIAL METHOD BLD: ABNORMAL
EOSINOPHIL # BLD: 0.1 K/UL (ref 0–0.4)
EOSINOPHIL NFR BLD: 2 % (ref 0–5)
ERYTHROCYTE [DISTWIDTH] IN BLOOD BY AUTOMATED COUNT: 15.3 % (ref 11.6–14.5)
GLOBULIN SER CALC-MCNC: 4 G/DL (ref 2–4)
GLUCOSE BLD STRIP.AUTO-MCNC: 110 MG/DL (ref 70–110)
GLUCOSE BLD STRIP.AUTO-MCNC: 110 MG/DL (ref 70–110)
GLUCOSE BLD STRIP.AUTO-MCNC: 73 MG/DL (ref 70–110)
GLUCOSE BLD STRIP.AUTO-MCNC: 91 MG/DL (ref 70–110)
GLUCOSE SERPL-MCNC: 92 MG/DL (ref 74–99)
HCT VFR BLD AUTO: 32.7 % (ref 36–48)
HGB BLD-MCNC: 10.1 G/DL (ref 13–16)
LYMPHOCYTES # BLD: 1.6 K/UL (ref 0.9–3.6)
LYMPHOCYTES NFR BLD: 28 % (ref 21–52)
MAGNESIUM SERPL-MCNC: 1.8 MG/DL (ref 1.6–2.6)
MCH RBC QN AUTO: 27.6 PG (ref 24–34)
MCHC RBC AUTO-ENTMCNC: 30.9 G/DL (ref 31–37)
MCV RBC AUTO: 89.3 FL (ref 78–100)
MONOCYTES # BLD: 0.4 K/UL (ref 0.05–1.2)
MONOCYTES NFR BLD: 8 % (ref 3–10)
NEUTS SEG # BLD: 3.5 K/UL (ref 1.8–8)
NEUTS SEG NFR BLD: 61 % (ref 40–73)
PHOSPHATE SERPL-MCNC: 2.1 MG/DL (ref 2.5–4.9)
PLATELET # BLD AUTO: 164 K/UL (ref 135–420)
PMV BLD AUTO: 11.5 FL (ref 9.2–11.8)
POTASSIUM SERPL-SCNC: 3.8 MMOL/L (ref 3.5–5.5)
PROT SERPL-MCNC: 5.6 G/DL (ref 6.4–8.2)
RBC # BLD AUTO: 3.66 M/UL (ref 4.35–5.65)
SODIUM SERPL-SCNC: 151 MMOL/L (ref 136–145)
VANCOMYCIN SERPL-MCNC: 16.9 UG/ML (ref 5–40)
WBC # BLD AUTO: 5.7 K/UL (ref 4.6–13.2)

## 2021-10-02 PROCEDURE — 74011250636 HC RX REV CODE- 250/636: Performed by: HOSPITALIST

## 2021-10-02 PROCEDURE — 82962 GLUCOSE BLOOD TEST: CPT

## 2021-10-02 PROCEDURE — 80053 COMPREHEN METABOLIC PANEL: CPT

## 2021-10-02 PROCEDURE — 80202 ASSAY OF VANCOMYCIN: CPT

## 2021-10-02 PROCEDURE — 36415 COLL VENOUS BLD VENIPUNCTURE: CPT

## 2021-10-02 PROCEDURE — 3E0G76Z INTRODUCTION OF NUTRITIONAL SUBSTANCE INTO UPPER GI, VIA NATURAL OR ARTIFICIAL OPENING: ICD-10-PCS | Performed by: HOSPITALIST

## 2021-10-02 PROCEDURE — 99232 SBSQ HOSP IP/OBS MODERATE 35: CPT | Performed by: HOSPITALIST

## 2021-10-02 PROCEDURE — 74011000250 HC RX REV CODE- 250: Performed by: HOSPITALIST

## 2021-10-02 PROCEDURE — 74011250637 HC RX REV CODE- 250/637: Performed by: HOSPITALIST

## 2021-10-02 PROCEDURE — 77030018798 HC PMP KT ENTRL FED COVD -A

## 2021-10-02 PROCEDURE — 65660000000 HC RM CCU STEPDOWN

## 2021-10-02 PROCEDURE — 85025 COMPLETE CBC W/AUTO DIFF WBC: CPT

## 2021-10-02 PROCEDURE — 83735 ASSAY OF MAGNESIUM: CPT

## 2021-10-02 PROCEDURE — 84100 ASSAY OF PHOSPHORUS: CPT

## 2021-10-02 PROCEDURE — 74011000258 HC RX REV CODE- 258: Performed by: HOSPITALIST

## 2021-10-02 PROCEDURE — 2709999900 HC NON-CHARGEABLE SUPPLY

## 2021-10-02 PROCEDURE — 0DH67UZ INSERTION OF FEEDING DEVICE INTO STOMACH, VIA NATURAL OR ARTIFICIAL OPENING: ICD-10-PCS | Performed by: HOSPITALIST

## 2021-10-02 RX ADMIN — DEXTROSE MONOHYDRATE AND SODIUM CHLORIDE 75 ML/HR: 5; .45 INJECTION, SOLUTION INTRAVENOUS at 21:40

## 2021-10-02 RX ADMIN — Medication 10 ML: at 17:18

## 2021-10-02 RX ADMIN — Medication 30 ML: at 10:21

## 2021-10-02 RX ADMIN — HEPARIN SODIUM 5000 UNITS: 5000 INJECTION INTRAVENOUS; SUBCUTANEOUS at 13:12

## 2021-10-02 RX ADMIN — DEXTROSE MONOHYDRATE AND SODIUM CHLORIDE 75 ML/HR: 5; .45 INJECTION, SOLUTION INTRAVENOUS at 06:22

## 2021-10-02 RX ADMIN — THIAMINE HYDROCHLORIDE 200 MG: 100 INJECTION, SOLUTION INTRAMUSCULAR; INTRAVENOUS at 11:04

## 2021-10-02 RX ADMIN — HEPARIN SODIUM 5000 UNITS: 5000 INJECTION INTRAVENOUS; SUBCUTANEOUS at 06:13

## 2021-10-02 RX ADMIN — Medication 10 ML: at 06:00

## 2021-10-02 RX ADMIN — VANCOMYCIN HYDROCHLORIDE 750 MG: 750 INJECTION, POWDER, LYOPHILIZED, FOR SOLUTION INTRAVENOUS at 13:12

## 2021-10-02 RX ADMIN — HEPARIN SODIUM 5000 UNITS: 5000 INJECTION INTRAVENOUS; SUBCUTANEOUS at 21:31

## 2021-10-02 RX ADMIN — TIMOLOL MALEATE 1 DROP: 5 SOLUTION OPHTHALMIC at 10:22

## 2021-10-02 RX ADMIN — Medication 10 ML: at 21:31

## 2021-10-02 RX ADMIN — CEFEPIME HYDROCHLORIDE 1 G: 1 INJECTION, POWDER, FOR SOLUTION INTRAMUSCULAR; INTRAVENOUS at 10:21

## 2021-10-02 RX ADMIN — TIMOLOL MALEATE 1 DROP: 5 SOLUTION OPHTHALMIC at 17:18

## 2021-10-02 NOTE — PROGRESS NOTES
Nutrition Note    Pt started on Nepro today at rate of 20 mL/hr per RN. Per hospital staff report, currently out of Nepro 1 L bottles for tube feeding; discussed with RN about tube feeding substitution as needed if Nepro not received in time. Pt receiving IVF, D5 1/2NS at 75 mL/hr (90 gm dextrose, 306 kcal per day). BM 9/30. Progressing towards nutrition goals. Recommendations/Plan   - Continue tube feeds of Nepro at 20 mL/hr and advance as tolerated by 10 mL q 8 hours to goal rate of 45 mL/hr with 50 mL q 1 hour water flushes       (goal regimen to provide 1944 kcal, 87 gm protein, 783 mL free water, 100% RDIs). - Monitor and replace electrolytes as needed due to concern for refeeding syndrome.  Continue thiamine and multivitamin.   - IVF per MD.     - If out of Nepro tube feeding formula, start using Jevity 1.5 at the current tolerated tube feeding rate, advancing as pt tolerates by 10 mL q 8 hours to goal rate of 50 mL/hr with water flushes of 50 mL q 1 hour.        (goal EN provides: 1800 kcal, 77 gm protein, 912 mL free water, 100% RDIs)      Electronically signed by Joe Marks RD on 10/2/2021 at 5:41 PM    Contact: [phone]

## 2021-10-02 NOTE — PROGRESS NOTES
[facility]  Progress Note    Patient: Azul Last Age: 80 y.o. : 3/14/1927 MR#: [id number] SSN: [id number]  Date/Time: 10/2/2021     Subjective:     Patient seen and evaluated, lying in bed, no acute distress. No new overnight events. Discussed with palliative care, reached out to son who wishes to continue current treatment and patient will undergo PEG tube placement when he is more stable and plan is to discharge home with home health  Continue tube feeding with NGT        Assessment/Plan:     1. Sepsis secondary to possible colitiscontinue broad-spectrum IV antibiotics. 2. JULIET on CKD 3 continue IVF, monitor creatinine, improving  3. Tachycardia likely secondary to #1continue gentle hydration  4. History of baseline dementia  5. History of dysphagiapatient has a PEG tube in place and presented to interventional radiology to get PEG tube replaced since it appeared to be clogged. We will replace PEG tube when patient is more stable to undergo procedure. Patient has been started on tube feeding, continue per nutrition. 10. Advanced age with failure to thrive    DVT prophylaxisHeparin  DNR/DNR    Discussed with palliative care, they reached out to son who mentions that he would like to continue current treatment plan and get a replacement on the patient's PEG tube when he is stable. Plan will be to discharge patient home with home health.                 Chinyere Rodriguez MD  10/02/21      Case discussed with:  []Patient  []Family  []Nursing  []Case Management  DVT Prophylaxis:  []Lovenox  []Hep SQ  []SCDs  []Coumadin   []On Heparin gtt    Objective:   VS:   Visit Vitals  BP (!) 115/2 (BP 1 Location: Left upper arm, BP Patient Position: Semi fowlers)   Pulse (!) 108   Temp 97.8 °F (36.6 °C)   Resp 13   Ht 5' 9\" (1.753 m)   Wt 53.6 kg (118 lb 2.7 oz)   SpO2 100%   BMI 17.45 kg/m²      Tmax/24hrs: Temp (24hrs), Av.8 °F (36.6 °C), Min:97.4 °F (36.3 °C), Max:98.2 °F (36.8 °C)  IOBRIEF    Intake/Output Summary (Last 24 hours) at 10/2/2021 1436  Last data filed at 10/1/2021 1800  Gross per 24 hour   Intake    Output 275 ml   Net -275 ml       General: Lying in bed, no acute distress  HEENT: PERRLA, anicteric sclerae. Pulmonary:  CTA Bilaterally. No Wheezing/Rhonchi/Rales. Cardiovascular: Regular rate and Rhythm. GI:  Soft, Non distended, Non tender. + Bowel sounds. Extremities:  No edema, cyanosis, clubbing. No calf tenderness. Neurologic: Unable to perform neurological examination  Additional:    Medications:   Current Facility-Administered Medications   Medication Dose Route Frequency    dextrose 5 % - 0.45% NaCl infusion  75 mL/hr IntraVENous CONTINUOUS    multivit-folic acid-herbal 189 (WELLESSE PLUS) oral liquid 30 mL  30 mL Per NG tube DAILY    sodium chloride (NS) flush 5-10 mL  5-10 mL IntraVENous PRN    cefepime (MAXIPIME) 1 g in sterile water (preservative free) 10 mL IV syringe  1 g IntraVENous Q24H    VANCOMYCIN INFORMATION NOTE   Other Rx Dosing/Monitoring    sodium chloride (NS) flush 5-40 mL  5-40 mL IntraVENous Q8H    sodium chloride (NS) flush 5-40 mL  5-40 mL IntraVENous PRN    acetaminophen (TYLENOL) tablet 650 mg  650 mg Oral Q6H PRN    Or    acetaminophen (TYLENOL) suppository 650 mg  650 mg Rectal Q6H PRN    polyethylene glycol (MIRALAX) packet 17 g  17 g Oral DAILY PRN    ondansetron (ZOFRAN ODT) tablet 4 mg  4 mg Oral Q8H PRN    Or    ondansetron (ZOFRAN) injection 4 mg  4 mg IntraVENous Q6H PRN    heparin (porcine) injection 5,000 Units  5,000 Units SubCUTAneous Q8H    timolol (TIMOPTIC) 0.5 % ophthalmic solution 1 Drop  1 Drop Both Eyes BID       Imaging:   XR Results (most recent):  Results from Hospital Encounter encounter on 09/29/21    XR ABD (KUB)    Narrative  EXAM : ABDOMEN PORTABLE  12:15 HOURS    CLINICAL HISTORY/INDICATION:  NG TUBE INSERTION    COMPARISON: Abdomen 9/30/2021 at 1625 hours.     TECHNIQUE: AP portable abdomen 1 view    FINDINGS:    The tip of the esophagogastric tube ends in the distal stomach. There is gas  and stool within the colon. No bowel dilatation is seen. No organomegaly is  noted. Bilateral calcified pleural and hemidiaphragmatic plaques are demonstrated. Impression  The esophagogastric tube ends distal stomach. Asbestosis related pleural disease       CT Results (most recent):  Results from Hospital Encounter encounter on 09/29/21    CT ABD PELV WO CONT    Narrative  CT ABDOMEN AND PELVIS UNENHANCED    CPT CODE: 02291 and 31147    INDICATION: Abdominal pain. Tachycardia noted in the cardiac Cath Lab.    TECHNIQUE: 5 mm collimation axial images obtained from the diaphragm to the  level of the pubic symphysis without intravenous contrast.    All CT scans at this facility are performed using dose optimization technique as  appropriate to this specific exam, to include automated exposure control,  adjustment of the mA and/or KP according to patient size or use of iterative  reconstruction techniques. COMPARISON: Prior CT 7/8/2021. ABDOMEN FINDINGS:  Mild COPD changes at the lung bases. Calcified granuloma right lower lobe. Lenticular atelectasis or scarring dependent left lung base adjacent to a tiny  pleural effusion. Scattered pleural-based calcification/granuloma. Lack of intravenous and oral contrast renders this study suboptimal for  evaluation of solid abdominal organs, vasculature and bowel. Liver not enlarged. No definite mass. Gallbladder is borderline distended, but contains no calcified stones. Pancreas significantly motion degraded. Not enlarged, with no regional  inflammation. Spleen unremarkable. Adrenal glands motion degraded but no mass identified.     No hydronephrosis or calcified renal stones.  -Similar motion degraded hyperdense lobulated 1 cm lesion superior medial right  renal cortex.  -Similar 8mm hyperdense lateral right cortical renal lesion image 25.  -Similar subcentimeter cortical hypodensities anterior lateral on the left image  23. Abdominal aorta is nonaneurysmal. Distal thoracic aorta to the thoracoabdominal  junction is prominent measuring up to 3.1 cm oblique AP diameter, unchanged. PELVIS FINDINGS:  No small bowel distention to suggest obstruction. Stomach is empty. Colon is not  distended. Cannot well assess for wall thickening or other abnormalities given  lack of distention and contrast. Subjective mild increased density in the  pericolonic mesenteric fat along the descending colon level of the pelvic inlet  image 58 for example, though significantly motion degraded. No definite  diverticuli regionally. Cannot exclude some regional inflammation. No free fluid in the pelvis. Prostate not enlarged. Bladder nearly empty and not well assessed. Similar heterogeneous density of the substance of the pelvic bones and sacrum,  with partial fusion across the SI joints. May reflect Paget's disease as appears  chronic. Similar mild superior endplate impression deformity of L2. Impression  1. Subjective mild degree of increased density/stranding pericolonic mid to  distal descending colon to proximal sigmoid levels, though noncontrast exam.  Cannot exclude colitis though colon is collapsed and not well assessed. Clinical  correlation needed. 2. Multiple bilateral hyperdense renal lesions may reflect hemorrhagic or  proteinaceous cyst. No hydronephrosis or stones. 3. Similar segmental enlargement of the distal thoracic aorta as previous. 4. Tiny left-sided pleural effusion new from previous. Calcified pleural and  diaphragmatic plaques.  -No bowel distention to suggest obstruction. 5. 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 Similar chronic degenerative change  and lumbar compression deformities.            Labs:    Recent Results (from the past 48 hour(s))   METABOLIC PANEL, COMPREHENSIVE    Collection Time: 10/01/21  4:05 AM Result Value Ref Range    Sodium 152 (H) 136 - 145 mmol/L    Potassium 4.7 3.5 - 5.5 mmol/L    Chloride 127 (H) 100 - 111 mmol/L    CO2 18 (L) 21 - 32 mmol/L    Anion gap 7 3.0 - 18 mmol/L    Glucose 110 (H) 74 - 99 mg/dL    BUN 43 (H) 7.0 - 18 MG/DL    Creatinine 1.34 (H) 0.6 - 1.3 MG/DL    BUN/Creatinine ratio 32 (H) 12 - 20      GFR est AA >60 >60 ml/min/1.73m2    GFR est non-AA 50 (L) >60 ml/min/1.73m2    Calcium 8.5 8.5 - 10.1 MG/DL    Bilirubin, total 0.4 0.2 - 1.0 MG/DL    ALT (SGPT) <6 (L) 16 - 61 U/L    AST (SGOT) 24 10 - 38 U/L    Alk. phosphatase 55 45 - 117 U/L    Protein, total 5.8 (L) 6.4 - 8.2 g/dL    Albumin 1.8 (L) 3.4 - 5.0 g/dL    Globulin 4.0 2.0 - 4.0 g/dL    A-G Ratio 0.5 (L) 0.8 - 1.7     CBC WITH AUTOMATED DIFF    Collection Time: 10/01/21  4:05 AM   Result Value Ref Range    WBC 6.9 4.6 - 13.2 K/uL    RBC 3.68 (L) 4.35 - 5.65 M/uL    HGB 10.1 (L) 13.0 - 16.0 g/dL    HCT 33.0 (L) 36.0 - 48.0 %    MCV 89.7 78.0 - 100.0 FL    MCH 27.4 24.0 - 34.0 PG    MCHC 30.6 (L) 31.0 - 37.0 g/dL    RDW 15.4 (H) 11.6 - 14.5 %    PLATELET 860 680 - 549 K/uL    MPV 11.3 9.2 - 11.8 FL    NEUTROPHILS 73 40 - 73 %    LYMPHOCYTES 20 (L) 21 - 52 %    MONOCYTES 5 3 - 10 %    EOSINOPHILS 1 0 - 5 %    BASOPHILS 0 0 - 2 %    ABS. NEUTROPHILS 5.0 1.8 - 8.0 K/UL    ABS. LYMPHOCYTES 1.4 0.9 - 3.6 K/UL    ABS. MONOCYTES 0.3 0.05 - 1.2 K/UL    ABS. EOSINOPHILS 0.1 0.0 - 0.4 K/UL    ABS.  BASOPHILS 0.0 0.0 - 0.1 K/UL    DF AUTOMATED     VANCOMYCIN, RANDOM    Collection Time: 10/01/21  4:05 AM   Result Value Ref Range    Vancomycin, random 14.8 5.0 - 40.0 UG/ML   PHOSPHORUS    Collection Time: 10/01/21  4:05 AM   Result Value Ref Range    Phosphorus 2.3 (L) 2.5 - 4.9 MG/DL   MAGNESIUM    Collection Time: 10/01/21  4:05 AM   Result Value Ref Range    Magnesium 2.1 1.6 - 2.6 mg/dL   GLUCOSE, POC    Collection Time: 10/01/21 12:08 PM   Result Value Ref Range    Glucose (POC) 117 (H) 70 - 110 mg/dL   GLUCOSE, POC    Collection Time: 10/01/21  5:58 PM   Result Value Ref Range    Glucose (POC) 117 (H) 70 - 110 mg/dL   GLUCOSE, POC    Collection Time: 10/02/21 12:59 AM   Result Value Ref Range    Glucose (POC) 91 70 - 042 mg/dL   METABOLIC PANEL, COMPREHENSIVE    Collection Time: 10/02/21  3:12 AM   Result Value Ref Range    Sodium 151 (H) 136 - 145 mmol/L    Potassium 3.8 3.5 - 5.5 mmol/L    Chloride 126 (H) 100 - 111 mmol/L    CO2 17 (L) 21 - 32 mmol/L    Anion gap 8 3.0 - 18 mmol/L    Glucose 92 74 - 99 mg/dL    BUN 31 (H) 7.0 - 18 MG/DL    Creatinine 1.25 0.6 - 1.3 MG/DL    BUN/Creatinine ratio 25 (H) 12 - 20      GFR est AA >60 >60 ml/min/1.73m2    GFR est non-AA 54 (L) >60 ml/min/1.73m2    Calcium 8.2 (L) 8.5 - 10.1 MG/DL    Bilirubin, total 0.5 0.2 - 1.0 MG/DL    ALT (SGPT) 10 (L) 16 - 61 U/L    AST (SGOT) 24 10 - 38 U/L    Alk. phosphatase 56 45 - 117 U/L    Protein, total 5.6 (L) 6.4 - 8.2 g/dL    Albumin 1.6 (L) 3.4 - 5.0 g/dL    Globulin 4.0 2.0 - 4.0 g/dL    A-G Ratio 0.4 (L) 0.8 - 1.7     CBC WITH AUTOMATED DIFF    Collection Time: 10/02/21  3:12 AM   Result Value Ref Range    WBC 5.7 4.6 - 13.2 K/uL    RBC 3.66 (L) 4.35 - 5.65 M/uL    HGB 10.1 (L) 13.0 - 16.0 g/dL    HCT 32.7 (L) 36.0 - 48.0 %    MCV 89.3 78.0 - 100.0 FL    MCH 27.6 24.0 - 34.0 PG    MCHC 30.9 (L) 31.0 - 37.0 g/dL    RDW 15.3 (H) 11.6 - 14.5 %    PLATELET 115 356 - 845 K/uL    MPV 11.5 9.2 - 11.8 FL    NEUTROPHILS 61 40 - 73 %    LYMPHOCYTES 28 21 - 52 %    MONOCYTES 8 3 - 10 %    EOSINOPHILS 2 0 - 5 %    BASOPHILS 1 0 - 2 %    ABS. NEUTROPHILS 3.5 1.8 - 8.0 K/UL    ABS. LYMPHOCYTES 1.6 0.9 - 3.6 K/UL    ABS. MONOCYTES 0.4 0.05 - 1.2 K/UL    ABS. EOSINOPHILS 0.1 0.0 - 0.4 K/UL    ABS.  BASOPHILS 0.0 0.0 - 0.1 K/UL    DF AUTOMATED     PHOSPHORUS    Collection Time: 10/02/21  3:12 AM   Result Value Ref Range    Phosphorus 2.1 (L) 2.5 - 4.9 MG/DL   MAGNESIUM    Collection Time: 10/02/21  3:12 AM   Result Value Ref Range    Magnesium 1.8 1.6 - 2.6 mg/dL VANCOMYCIN, RANDOM    Collection Time: 10/02/21  3:12 AM   Result Value Ref Range    Vancomycin, random 16.9 5.0 - 40.0 UG/ML   GLUCOSE, POC    Collection Time: 10/02/21  5:29 AM   Result Value Ref Range    Glucose (POC) 73 70 - 110 mg/dL   GLUCOSE, POC    Collection Time: 10/02/21 12:26 PM   Result Value Ref Range    Glucose (POC) 110 70 - 110 mg/dL       Signed By: Cee Koch MD     October 2, 2021      I spent 25 minutes with the patient in face-to-face consultation, of which greater than 50% was spent in counseling and coordination of care as described above    Disclaimer: Sections of this note are dictated using utilizing voice recognition software. Minor typographical errors may be present. If questions arise, please do not hesitate to contact me or call our department.

## 2021-10-02 NOTE — PROGRESS NOTES
Abdomen KUB confirmed NGT is in distal stomach. Tube feed started at 20 ml/hr per written order with 50 ml flush every hour. Will increase feed by 10 ml/hr in 8 hours.

## 2021-10-03 ENCOUNTER — APPOINTMENT (OUTPATIENT)
Dept: GENERAL RADIOLOGY | Age: 86
DRG: 871 | End: 2021-10-03
Attending: STUDENT IN AN ORGANIZED HEALTH CARE EDUCATION/TRAINING PROGRAM
Payer: MEDICARE

## 2021-10-03 LAB
ANION GAP SERPL CALC-SCNC: 9 MMOL/L (ref 3–18)
BUN SERPL-MCNC: 25 MG/DL (ref 7–18)
BUN/CREAT SERPL: 20 (ref 12–20)
CALCIUM SERPL-MCNC: 8.1 MG/DL (ref 8.5–10.1)
CHLORIDE SERPL-SCNC: 117 MMOL/L (ref 100–111)
CO2 SERPL-SCNC: 18 MMOL/L (ref 21–32)
CREAT SERPL-MCNC: 1.23 MG/DL (ref 0.6–1.3)
GLUCOSE BLD STRIP.AUTO-MCNC: 104 MG/DL (ref 70–110)
GLUCOSE BLD STRIP.AUTO-MCNC: 105 MG/DL (ref 70–110)
GLUCOSE BLD STRIP.AUTO-MCNC: 107 MG/DL (ref 70–110)
GLUCOSE BLD STRIP.AUTO-MCNC: 126 MG/DL (ref 70–110)
GLUCOSE SERPL-MCNC: 110 MG/DL (ref 74–99)
MAGNESIUM SERPL-MCNC: 1.6 MG/DL (ref 1.6–2.6)
PHOSPHATE SERPL-MCNC: 2 MG/DL (ref 2.5–4.9)
POTASSIUM SERPL-SCNC: 3.3 MMOL/L (ref 3.5–5.5)
SODIUM SERPL-SCNC: 144 MMOL/L (ref 136–145)
VANCOMYCIN SERPL-MCNC: 18.6 UG/ML (ref 5–40)

## 2021-10-03 PROCEDURE — 71045 X-RAY EXAM CHEST 1 VIEW: CPT

## 2021-10-03 PROCEDURE — 74011250636 HC RX REV CODE- 250/636: Performed by: HOSPITALIST

## 2021-10-03 PROCEDURE — 80048 BASIC METABOLIC PNL TOTAL CA: CPT

## 2021-10-03 PROCEDURE — 74011000258 HC RX REV CODE- 258: Performed by: HOSPITALIST

## 2021-10-03 PROCEDURE — 80202 ASSAY OF VANCOMYCIN: CPT

## 2021-10-03 PROCEDURE — 65660000000 HC RM CCU STEPDOWN

## 2021-10-03 PROCEDURE — 84100 ASSAY OF PHOSPHORUS: CPT

## 2021-10-03 PROCEDURE — 74011250637 HC RX REV CODE- 250/637: Performed by: HOSPITALIST

## 2021-10-03 PROCEDURE — 36415 COLL VENOUS BLD VENIPUNCTURE: CPT

## 2021-10-03 PROCEDURE — 99232 SBSQ HOSP IP/OBS MODERATE 35: CPT | Performed by: HOSPITALIST

## 2021-10-03 PROCEDURE — 2709999900 HC NON-CHARGEABLE SUPPLY

## 2021-10-03 PROCEDURE — 74011000250 HC RX REV CODE- 250: Performed by: HOSPITALIST

## 2021-10-03 PROCEDURE — 83735 ASSAY OF MAGNESIUM: CPT

## 2021-10-03 PROCEDURE — 82962 GLUCOSE BLOOD TEST: CPT

## 2021-10-03 RX ADMIN — HEPARIN SODIUM 5000 UNITS: 5000 INJECTION INTRAVENOUS; SUBCUTANEOUS at 15:22

## 2021-10-03 RX ADMIN — HEPARIN SODIUM 5000 UNITS: 5000 INJECTION INTRAVENOUS; SUBCUTANEOUS at 22:06

## 2021-10-03 RX ADMIN — Medication 10 ML: at 22:06

## 2021-10-03 RX ADMIN — TIMOLOL MALEATE 1 DROP: 5 SOLUTION OPHTHALMIC at 18:30

## 2021-10-03 RX ADMIN — Medication 10 ML: at 15:22

## 2021-10-03 RX ADMIN — DEXTROSE MONOHYDRATE AND SODIUM CHLORIDE 75 ML/HR: 5; .45 INJECTION, SOLUTION INTRAVENOUS at 12:02

## 2021-10-03 RX ADMIN — Medication 10 ML: at 05:12

## 2021-10-03 RX ADMIN — TIMOLOL MALEATE 1 DROP: 5 SOLUTION OPHTHALMIC at 09:23

## 2021-10-03 RX ADMIN — VANCOMYCIN HYDROCHLORIDE 500 MG: 500 INJECTION, POWDER, LYOPHILIZED, FOR SOLUTION INTRAVENOUS at 12:02

## 2021-10-03 RX ADMIN — Medication 30 ML: at 09:23

## 2021-10-03 RX ADMIN — HEPARIN SODIUM 5000 UNITS: 5000 INJECTION INTRAVENOUS; SUBCUTANEOUS at 05:12

## 2021-10-03 RX ADMIN — CEFEPIME HYDROCHLORIDE 1 G: 1 INJECTION, POWDER, FOR SOLUTION INTRAMUSCULAR; INTRAVENOUS at 09:23

## 2021-10-03 NOTE — ROUTINE PROCESS
Bedside and Verbal shift change report given to Wagner Navarro RN (oncoming nurse) by Leatha Hobbs RN(offgoing nurse).  Report included the following information SBAR, Intake/Output, MAR, Recent Results and Cardiac Rhythm SR.

## 2021-10-03 NOTE — PROGRESS NOTES
[facility] Hospitalist Group  Progress Note    Patient: Sari Reza Age: 80 y.o. : 3/14/1927 MR#: [id number] SSN: [id number]  Date/Time: 10/3/2021     Subjective:     Patient seen and evaluated, lying in bed, no acute distress. No new overnight events. Discussed with palliative care, reached out to son who wishes to continue current treatment and patient will undergo PEG tube placement when he is more stable and plan is to discharge home with home health  Continue tube feeding with NGT        Assessment/Plan:     1. Sepsis secondary to possible colitiscontinue broad-spectrum IV antibiotics. 2. JULIET on CKD 3 continue IVF, monitor creatinine, improving  3. Tachycardia likely secondary to #1continue gentle hydration  4. History of baseline dementia  5. History of dysphagiapatient has a PEG tube in place and presented to interventional radiology to get PEG tube replaced since it appeared to be clogged. Replace PEG tube in a.m. by IR. 6. Advanced age with failure to thrive  7. Severe malnutrition    DVT prophylaxisHeparin  DNR/DNR    Discussed with palliative care, they reached out to son who mentions that he would like to continue current treatment plan and get a replacement on the patient's PEG tube when he is stable. Plan will be to discharge patient home with home health.                 Evie Ramirez MD  10/03/21      Case discussed with:  []Patient  []Family  []Nursing  []Case Management  DVT Prophylaxis:  []Lovenox  []Hep SQ  []SCDs  []Coumadin   []On Heparin gtt    Objective:   VS:   Visit Vitals  /83   Pulse 95   Temp 98.2 °F (36.8 °C)   Resp 15   Ht 5' 9\" (1.753 m)   Wt 51.8 kg (114 lb 4.8 oz)   SpO2 100%   BMI 16.88 kg/m²      Tmax/24hrs: Temp (24hrs), Av.2 °F (36.8 °C), Min:97.9 °F (36.6 °C), Max:98.5 °F (36.9 °C)  IOBRIEF    Intake/Output Summary (Last 24 hours) at 10/3/2021 1320  Last data filed at 10/3/2021 8136  Gross per 24 hour   Intake 1040 ml Output 300 ml   Net 740 ml       General: Lying in bed, no acute distress, thinly built male  HEENT: PERRLA, anicteric sclerae. Pulmonary:  CTA Bilaterally. No Wheezing/Rhonchi/Rales. Cardiovascular: Regular rate and Rhythm. GI:  Soft, Non distended, Non tender. + Bowel sounds. Extremities:  No edema, cyanosis, clubbing. No calf tenderness. Neurologic: Unable to perform neurological examination  Additional:    Medications:   Current Facility-Administered Medications   Medication Dose Route Frequency    dextrose 5 % - 0.45% NaCl infusion  75 mL/hr IntraVENous CONTINUOUS    multivit-folic acid-herbal 906 (WELLESSE PLUS) oral liquid 30 mL  30 mL Per NG tube DAILY    sodium chloride (NS) flush 5-10 mL  5-10 mL IntraVENous PRN    cefepime (MAXIPIME) 1 g in sterile water (preservative free) 10 mL IV syringe  1 g IntraVENous Q24H    VANCOMYCIN INFORMATION NOTE   Other Rx Dosing/Monitoring    sodium chloride (NS) flush 5-40 mL  5-40 mL IntraVENous Q8H    sodium chloride (NS) flush 5-40 mL  5-40 mL IntraVENous PRN    acetaminophen (TYLENOL) tablet 650 mg  650 mg Oral Q6H PRN    Or    acetaminophen (TYLENOL) suppository 650 mg  650 mg Rectal Q6H PRN    polyethylene glycol (MIRALAX) packet 17 g  17 g Oral DAILY PRN    ondansetron (ZOFRAN ODT) tablet 4 mg  4 mg Oral Q8H PRN    Or    ondansetron (ZOFRAN) injection 4 mg  4 mg IntraVENous Q6H PRN    heparin (porcine) injection 5,000 Units  5,000 Units SubCUTAneous Q8H    timolol (TIMOPTIC) 0.5 % ophthalmic solution 1 Drop  1 Drop Both Eyes BID       Imaging:   XR Results (most recent):  Results from Hospital Encounter encounter on 09/29/21    XR ABD (KUB)    Narrative  EXAM : ABDOMEN PORTABLE  12:15 HOURS    CLINICAL HISTORY/INDICATION:  NG TUBE INSERTION    COMPARISON: Abdomen 9/30/2021 at 1625 hours. TECHNIQUE: AP portable abdomen 1 view    FINDINGS:    The tip of the esophagogastric tube ends in the distal stomach.   There is gas  and stool within the colon.  No bowel dilatation is seen. No organomegaly is  noted. Bilateral calcified pleural and hemidiaphragmatic plaques are demonstrated. Impression  The esophagogastric tube ends distal stomach. Asbestosis related pleural disease       CT Results (most recent):  Results from Hospital Encounter encounter on 09/29/21    CT ABD PELV WO CONT    Narrative  CT ABDOMEN AND PELVIS UNENHANCED    CPT CODE: 96392 and 20283    INDICATION: Abdominal pain. Tachycardia noted in the cardiac Cath Lab.    TECHNIQUE: 5 mm collimation axial images obtained from the diaphragm to the  level of the pubic symphysis without intravenous contrast.    All CT scans at this facility are performed using dose optimization technique as  appropriate to this specific exam, to include automated exposure control,  adjustment of the mA and/or KP according to patient size or use of iterative  reconstruction techniques. COMPARISON: Prior CT 7/8/2021. ABDOMEN FINDINGS:  Mild COPD changes at the lung bases. Calcified granuloma right lower lobe. Lenticular atelectasis or scarring dependent left lung base adjacent to a tiny  pleural effusion. Scattered pleural-based calcification/granuloma. Lack of intravenous and oral contrast renders this study suboptimal for  evaluation of solid abdominal organs, vasculature and bowel. Liver not enlarged. No definite mass. Gallbladder is borderline distended, but contains no calcified stones. Pancreas significantly motion degraded. Not enlarged, with no regional  inflammation. Spleen unremarkable. Adrenal glands motion degraded but no mass identified. No hydronephrosis or calcified renal stones.  -Similar motion degraded hyperdense lobulated 1 cm lesion superior medial right  renal cortex.  -Similar 8mm hyperdense lateral right cortical renal lesion image 25.  -Similar subcentimeter cortical hypodensities anterior lateral on the left image  23.     Abdominal aorta is nonaneurysmal. Distal thoracic aorta to the thoracoabdominal  junction is prominent measuring up to 3.1 cm oblique AP diameter, unchanged. PELVIS FINDINGS:  No small bowel distention to suggest obstruction. Stomach is empty. Colon is not  distended. Cannot well assess for wall thickening or other abnormalities given  lack of distention and contrast. Subjective mild increased density in the  pericolonic mesenteric fat along the descending colon level of the pelvic inlet  image 58 for example, though significantly motion degraded. No definite  diverticuli regionally. Cannot exclude some regional inflammation. No free fluid in the pelvis. Prostate not enlarged. Bladder nearly empty and not well assessed. Similar heterogeneous density of the substance of the pelvic bones and sacrum,  with partial fusion across the SI joints. May reflect Paget's disease as appears  chronic. Similar mild superior endplate impression deformity of L2. Impression  1. Subjective mild degree of increased density/stranding pericolonic mid to  distal descending colon to proximal sigmoid levels, though noncontrast exam.  Cannot exclude colitis though colon is collapsed and not well assessed. Clinical  correlation needed. 2. Multiple bilateral hyperdense renal lesions may reflect hemorrhagic or  proteinaceous cyst. No hydronephrosis or stones. 3. Similar segmental enlargement of the distal thoracic aorta as previous. 4. Tiny left-sided pleural effusion new from previous. Calcified pleural and  diaphragmatic plaques.  -No bowel distention to suggest obstruction. 5. 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 Similar chronic degenerative change  and lumbar compression deformities.            Labs:    Recent Results (from the past 48 hour(s))   GLUCOSE, POC    Collection Time: 10/01/21  5:58 PM   Result Value Ref Range    Glucose (POC) 117 (H) 70 - 110 mg/dL   GLUCOSE, POC    Collection Time: 10/02/21 12:59 AM   Result Value Ref Range    Glucose (POC) 91 70 - 057 mg/dL   METABOLIC PANEL, COMPREHENSIVE    Collection Time: 10/02/21  3:12 AM   Result Value Ref Range    Sodium 151 (H) 136 - 145 mmol/L    Potassium 3.8 3.5 - 5.5 mmol/L    Chloride 126 (H) 100 - 111 mmol/L    CO2 17 (L) 21 - 32 mmol/L    Anion gap 8 3.0 - 18 mmol/L    Glucose 92 74 - 99 mg/dL    BUN 31 (H) 7.0 - 18 MG/DL    Creatinine 1.25 0.6 - 1.3 MG/DL    BUN/Creatinine ratio 25 (H) 12 - 20      GFR est AA >60 >60 ml/min/1.73m2    GFR est non-AA 54 (L) >60 ml/min/1.73m2    Calcium 8.2 (L) 8.5 - 10.1 MG/DL    Bilirubin, total 0.5 0.2 - 1.0 MG/DL    ALT (SGPT) 10 (L) 16 - 61 U/L    AST (SGOT) 24 10 - 38 U/L    Alk. phosphatase 56 45 - 117 U/L    Protein, total 5.6 (L) 6.4 - 8.2 g/dL    Albumin 1.6 (L) 3.4 - 5.0 g/dL    Globulin 4.0 2.0 - 4.0 g/dL    A-G Ratio 0.4 (L) 0.8 - 1.7     CBC WITH AUTOMATED DIFF    Collection Time: 10/02/21  3:12 AM   Result Value Ref Range    WBC 5.7 4.6 - 13.2 K/uL    RBC 3.66 (L) 4.35 - 5.65 M/uL    HGB 10.1 (L) 13.0 - 16.0 g/dL    HCT 32.7 (L) 36.0 - 48.0 %    MCV 89.3 78.0 - 100.0 FL    MCH 27.6 24.0 - 34.0 PG    MCHC 30.9 (L) 31.0 - 37.0 g/dL    RDW 15.3 (H) 11.6 - 14.5 %    PLATELET 262 130 - 727 K/uL    MPV 11.5 9.2 - 11.8 FL    NEUTROPHILS 61 40 - 73 %    LYMPHOCYTES 28 21 - 52 %    MONOCYTES 8 3 - 10 %    EOSINOPHILS 2 0 - 5 %    BASOPHILS 1 0 - 2 %    ABS. NEUTROPHILS 3.5 1.8 - 8.0 K/UL    ABS. LYMPHOCYTES 1.6 0.9 - 3.6 K/UL    ABS. MONOCYTES 0.4 0.05 - 1.2 K/UL    ABS. EOSINOPHILS 0.1 0.0 - 0.4 K/UL    ABS.  BASOPHILS 0.0 0.0 - 0.1 K/UL    DF AUTOMATED     PHOSPHORUS    Collection Time: 10/02/21  3:12 AM   Result Value Ref Range    Phosphorus 2.1 (L) 2.5 - 4.9 MG/DL   MAGNESIUM    Collection Time: 10/02/21  3:12 AM   Result Value Ref Range    Magnesium 1.8 1.6 - 2.6 mg/dL   VANCOMYCIN, RANDOM    Collection Time: 10/02/21  3:12 AM   Result Value Ref Range    Vancomycin, random 16.9 5.0 - 40.0 UG/ML   GLUCOSE, POC    Collection Time: 10/02/21  5:29 AM   Result Value Ref Range    Glucose (POC) 73 70 - 110 mg/dL   GLUCOSE, POC    Collection Time: 10/02/21 12:26 PM   Result Value Ref Range    Glucose (POC) 110 70 - 110 mg/dL   GLUCOSE, POC    Collection Time: 10/02/21 11:57 PM   Result Value Ref Range    Glucose (POC) 110 70 - 110 mg/dL   PHOSPHORUS    Collection Time: 10/03/21  3:18 AM   Result Value Ref Range    Phosphorus 2.0 (L) 2.5 - 4.9 MG/DL   MAGNESIUM    Collection Time: 10/03/21  3:18 AM   Result Value Ref Range    Magnesium 1.6 1.6 - 2.6 mg/dL   METABOLIC PANEL, BASIC    Collection Time: 10/03/21  3:18 AM   Result Value Ref Range    Sodium 144 136 - 145 mmol/L    Potassium 3.3 (L) 3.5 - 5.5 mmol/L    Chloride 117 (H) 100 - 111 mmol/L    CO2 18 (L) 21 - 32 mmol/L    Anion gap 9 3.0 - 18 mmol/L    Glucose 110 (H) 74 - 99 mg/dL    BUN 25 (H) 7.0 - 18 MG/DL    Creatinine 1.23 0.6 - 1.3 MG/DL    BUN/Creatinine ratio 20 12 - 20      GFR est AA >60 >60 ml/min/1.73m2    GFR est non-AA 55 (L) >60 ml/min/1.73m2    Calcium 8.1 (L) 8.5 - 10.1 MG/DL   VANCOMYCIN, RANDOM    Collection Time: 10/03/21  3:18 AM   Result Value Ref Range    Vancomycin, random 18.6 5.0 - 40.0 UG/ML   GLUCOSE, POC    Collection Time: 10/03/21  5:13 AM   Result Value Ref Range    Glucose (POC) 107 70 - 110 mg/dL   GLUCOSE, POC    Collection Time: 10/03/21 12:41 PM   Result Value Ref Range    Glucose (POC) 104 70 - 110 mg/dL       Signed By: Juan C Flores MD     October 3, 2021      I spent 25 minutes with the patient in face-to-face consultation, of which greater than 50% was spent in counseling and coordination of care as described above    Disclaimer: Sections of this note are dictated using utilizing voice recognition software. Minor typographical errors may be present. If questions arise, please do not hesitate to contact me or call our department.

## 2021-10-04 LAB
ANION GAP SERPL CALC-SCNC: 7 MMOL/L (ref 3–18)
BUN SERPL-MCNC: 24 MG/DL (ref 7–18)
BUN/CREAT SERPL: 22 (ref 12–20)
CALCIUM SERPL-MCNC: 7.9 MG/DL (ref 8.5–10.1)
CHLORIDE SERPL-SCNC: 112 MMOL/L (ref 100–111)
CO2 SERPL-SCNC: 20 MMOL/L (ref 21–32)
CREAT SERPL-MCNC: 1.11 MG/DL (ref 0.6–1.3)
GLUCOSE BLD STRIP.AUTO-MCNC: 110 MG/DL (ref 70–110)
GLUCOSE BLD STRIP.AUTO-MCNC: 112 MG/DL (ref 70–110)
GLUCOSE BLD STRIP.AUTO-MCNC: 116 MG/DL (ref 70–110)
GLUCOSE SERPL-MCNC: 128 MG/DL (ref 74–99)
MAGNESIUM SERPL-MCNC: 1.7 MG/DL (ref 1.6–2.6)
PHOSPHATE SERPL-MCNC: 1.7 MG/DL (ref 2.5–4.9)
POTASSIUM SERPL-SCNC: 3.8 MMOL/L (ref 3.5–5.5)
SODIUM SERPL-SCNC: 139 MMOL/L (ref 136–145)
VANCOMYCIN SERPL-MCNC: 14 UG/ML (ref 5–40)

## 2021-10-04 PROCEDURE — 80202 ASSAY OF VANCOMYCIN: CPT

## 2021-10-04 PROCEDURE — 74011250636 HC RX REV CODE- 250/636: Performed by: HOSPITALIST

## 2021-10-04 PROCEDURE — 74011000250 HC RX REV CODE- 250: Performed by: HOSPITALIST

## 2021-10-04 PROCEDURE — 80048 BASIC METABOLIC PNL TOTAL CA: CPT

## 2021-10-04 PROCEDURE — 84100 ASSAY OF PHOSPHORUS: CPT

## 2021-10-04 PROCEDURE — 65660000000 HC RM CCU STEPDOWN

## 2021-10-04 PROCEDURE — 36415 COLL VENOUS BLD VENIPUNCTURE: CPT

## 2021-10-04 PROCEDURE — 2709999900 HC NON-CHARGEABLE SUPPLY

## 2021-10-04 PROCEDURE — 99232 SBSQ HOSP IP/OBS MODERATE 35: CPT | Performed by: HOSPITALIST

## 2021-10-04 PROCEDURE — 82962 GLUCOSE BLOOD TEST: CPT

## 2021-10-04 PROCEDURE — 83735 ASSAY OF MAGNESIUM: CPT

## 2021-10-04 RX ADMIN — TIMOLOL MALEATE 1 DROP: 5 SOLUTION OPHTHALMIC at 17:36

## 2021-10-04 RX ADMIN — VANCOMYCIN HYDROCHLORIDE 750 MG: 750 INJECTION, POWDER, LYOPHILIZED, FOR SOLUTION INTRAVENOUS at 08:56

## 2021-10-04 RX ADMIN — DEXTROSE MONOHYDRATE AND SODIUM CHLORIDE 75 ML/HR: 5; .45 INJECTION, SOLUTION INTRAVENOUS at 17:49

## 2021-10-04 RX ADMIN — DEXTROSE MONOHYDRATE AND SODIUM CHLORIDE 75 ML/HR: 5; .45 INJECTION, SOLUTION INTRAVENOUS at 04:23

## 2021-10-04 RX ADMIN — CEFEPIME HYDROCHLORIDE 1 G: 1 INJECTION, POWDER, FOR SOLUTION INTRAMUSCULAR; INTRAVENOUS at 13:13

## 2021-10-04 RX ADMIN — Medication 10 ML: at 04:29

## 2021-10-04 RX ADMIN — TIMOLOL MALEATE 1 DROP: 5 SOLUTION OPHTHALMIC at 08:57

## 2021-10-04 RX ADMIN — Medication 10 ML: at 04:37

## 2021-10-04 RX ADMIN — Medication 10 ML: at 14:00

## 2021-10-04 NOTE — PROGRESS NOTES
Chart reviewed. PEG is scheduled. Plan is for home with home health services.   Lucas Moncada RN - Outcomes Manager  [phone]

## 2021-10-04 NOTE — ROUTINE PROCESS
1920 Assumed care of patient from off going nurse, Rebekah Collins RN. Patient resting in bed. Intermittent coughing noted. Patient voices no complaints at this time. Tube feeding infusing at 40 ml/hr with 50 ml water flushes set on Kangaroo pump Q1 hour. IV to LFA is out. Was notified by off going nurse that IV came out. Left arm swollen and cool to the touch. Arm elevated on 2 pillows and warm compress applied. Call bell within reach, siderails up x 3, bed in lowest position, and patient instructed to use call bell for assistance. Will continue to monitor. 2200 Patient constantly coughing. Tube feeding stopped. Suctioned patient mouth with Clearence Loupe. Frothy sputum noted. HR 120s-130s. 2246 Dr. Laura Lloyd notified of possible aspiration. HR 130s. Patient consistently coughing. Frothy sputum noted in Clearence Loupe. 2258 STAT Chest xray performed. 0720 Bedside and Verbal shift change report given to Tia Eldon RN (oncoming nurse) by Sadiq Alvarez RN  (offgoing nurse).  Report included the following information SBAR, Intake/Output, MAR, Recent Results and Cardiac Rhythm ST.

## 2021-10-04 NOTE — ROUTINE PROCESS
0715: Bedside shift change report given to [address] (oncoming nurse) by Edison Duong RN (offgoing nurse). Report included the following information SBAR, Kardex, Intake/Output, MAR and Recent Results. 1800: Spoke with MD. Verbal order with readback to restart tube feeding at half of goal rate. 1920: Bedside and Verbal shift change report given to [address] (oncoming nurse) by [address] (offgoing nurse). Report included the following information SBAR, Kardex, Intake/Output, MAR and Recent Results.

## 2021-10-04 NOTE — PROGRESS NOTES
[facility]  Progress Note    Patient: Flo Singh Age: 80 y.o. : 3/14/1927 MR#: [id number] SSN: [id number]  Date/Time: 10/4/2021     Subjective:     Patient seen and evaluated, lying in bed, no acute distress. No new overnight events. Discussed with palliative care, reached out to son who wishes to continue current treatment and patient will undergo PEG tube placement when he is more stable and plan is to discharge home with home health  Continue tube feeding with NGT        Assessment/Plan:     1. Sepsis secondary to possible colitiscontinue broad-spectrum IV antibiotics. 2. JULIET on CKD 3 continue IVF, monitor creatinine, improving  3. Tachycardia likely secondary to #1continue gentle hydration  4. History of baseline dementia  5. History of dysphagiapatient is currently on NG tube feeding, patient is not a candidate for PEG tube placement I discussed with patient son who is in agreement, we are supposed to meet tomorrow to possibly discuss comfort feeds and discharge home. 6. Advanced age with failure to thrive  7. Severe malnutrition    DVT prophylaxisHeparin  DNR/DNR      Plan will be to discharge patient home with home health.                 Juan C Flores MD  10/04/21      Case discussed with:  []Patient  []Family  []Nursing  []Case Management  DVT Prophylaxis:  []Lovenox  []Hep SQ  []SCDs  []Coumadin   []On Heparin gtt    Objective:   VS:   Visit Vitals  /77   Pulse (!) 122   Temp 97.6 °F (36.4 °C)   Resp 23   Ht 5' 9\" (1.753 m)   Wt 52.2 kg (115 lb 1.6 oz)   SpO2 98%   BMI 17.00 kg/m²      Tmax/24hrs: Temp (24hrs), Av °F (36.7 °C), Min:97.6 °F (36.4 °C), Max:98.3 °F (36.8 °C)  IOBRIEF    Intake/Output Summary (Last 24 hours) at 10/4/2021 1509  Last data filed at 10/4/2021 1202  Gross per 24 hour   Intake 1166.25 ml   Output 800 ml   Net 366.25 ml       General: Lying in bed, no acute distress, thinly built male  HEENT: PERRLA, anicteric sclerae. Pulmonary:  CTA Bilaterally. No Wheezing/Rhonchi/Rales. Cardiovascular: Regular rate and Rhythm. GI:  Soft, Non distended, Non tender. + Bowel sounds. Extremities:  No edema, cyanosis, clubbing. No calf tenderness. Neurologic: Unable to perform neurological examination  Additional:    Medications:   Current Facility-Administered Medications   Medication Dose Route Frequency    [START ON 10/5/2021] cefepime (MAXIPIME) 2 g in sterile water (preservative free) 10 mL IV syringe  2 g IntraVENous Q24H    dextrose 5 % - 0.45% NaCl infusion  75 mL/hr IntraVENous CONTINUOUS    multivit-folic acid-herbal 804 (WELLESSE PLUS) oral liquid 30 mL  30 mL Per NG tube DAILY    sodium chloride (NS) flush 5-10 mL  5-10 mL IntraVENous PRN    VANCOMYCIN INFORMATION NOTE   Other Rx Dosing/Monitoring    sodium chloride (NS) flush 5-40 mL  5-40 mL IntraVENous Q8H    sodium chloride (NS) flush 5-40 mL  5-40 mL IntraVENous PRN    acetaminophen (TYLENOL) tablet 650 mg  650 mg Oral Q6H PRN    Or    acetaminophen (TYLENOL) suppository 650 mg  650 mg Rectal Q6H PRN    polyethylene glycol (MIRALAX) packet 17 g  17 g Oral DAILY PRN    ondansetron (ZOFRAN ODT) tablet 4 mg  4 mg Oral Q8H PRN    Or    ondansetron (ZOFRAN) injection 4 mg  4 mg IntraVENous Q6H PRN    heparin (porcine) injection 5,000 Units  5,000 Units SubCUTAneous Q8H    timolol (TIMOPTIC) 0.5 % ophthalmic solution 1 Drop  1 Drop Both Eyes BID       Imaging:   XR Results (most recent):  Results from Hospital Encounter encounter on 09/29/21    XR CHEST PORT    Narrative  EXAM: PORTABLE CHEST 2303 hours    CLINICAL HISTORY/INDICATION: possible aspiration , sepsis, acute kidney injury,  tachycardia, dementia, dysphagia, nonfunctioning PEG tube    COMPARISON: Chest x-ray 9/29/2021. TECHNIQUE: Single AP view    FINDINGS:    An esophagogastric tube terminates at the distal stomach.  There is hazy opacity  at the left greater than the right lung base with ill definition of the  hemidiaphragm. Bilateral calcified pleural plaques. Normal pulmonary  vascularity. .    Impression  Small bilateral layering pleural effusions. Esophagogastric tube terminates at the distal stomach       CT Results (most recent):  Results from Hospital Encounter encounter on 09/29/21    CT ABD PELV WO CONT    Narrative  CT ABDOMEN AND PELVIS UNENHANCED    CPT CODE: 13808 and 00933    INDICATION: Abdominal pain. Tachycardia noted in the cardiac Cath Lab.    TECHNIQUE: 5 mm collimation axial images obtained from the diaphragm to the  level of the pubic symphysis without intravenous contrast.    All CT scans at this facility are performed using dose optimization technique as  appropriate to this specific exam, to include automated exposure control,  adjustment of the mA and/or KP according to patient size or use of iterative  reconstruction techniques. COMPARISON: Prior CT 7/8/2021. ABDOMEN FINDINGS:  Mild COPD changes at the lung bases. Calcified granuloma right lower lobe. Lenticular atelectasis or scarring dependent left lung base adjacent to a tiny  pleural effusion. Scattered pleural-based calcification/granuloma. Lack of intravenous and oral contrast renders this study suboptimal for  evaluation of solid abdominal organs, vasculature and bowel. Liver not enlarged. No definite mass. Gallbladder is borderline distended, but contains no calcified stones. Pancreas significantly motion degraded. Not enlarged, with no regional  inflammation. Spleen unremarkable. Adrenal glands motion degraded but no mass identified. No hydronephrosis or calcified renal stones.  -Similar motion degraded hyperdense lobulated 1 cm lesion superior medial right  renal cortex.  -Similar 8mm hyperdense lateral right cortical renal lesion image 25.  -Similar subcentimeter cortical hypodensities anterior lateral on the left image  23.     Abdominal aorta is nonaneurysmal. Distal thoracic aorta to the thoracoabdominal  junction is prominent measuring up to 3.1 cm oblique AP diameter, unchanged. PELVIS FINDINGS:  No small bowel distention to suggest obstruction. Stomach is empty. Colon is not  distended. Cannot well assess for wall thickening or other abnormalities given  lack of distention and contrast. Subjective mild increased density in the  pericolonic mesenteric fat along the descending colon level of the pelvic inlet  image 58 for example, though significantly motion degraded. No definite  diverticuli regionally. Cannot exclude some regional inflammation. No free fluid in the pelvis. Prostate not enlarged. Bladder nearly empty and not well assessed. Similar heterogeneous density of the substance of the pelvic bones and sacrum,  with partial fusion across the SI joints. May reflect Paget's disease as appears  chronic. Similar mild superior endplate impression deformity of L2. Impression  1. Subjective mild degree of increased density/stranding pericolonic mid to  distal descending colon to proximal sigmoid levels, though noncontrast exam.  Cannot exclude colitis though colon is collapsed and not well assessed. Clinical  correlation needed. 2. Multiple bilateral hyperdense renal lesions may reflect hemorrhagic or  proteinaceous cyst. No hydronephrosis or stones. 3. Similar segmental enlargement of the distal thoracic aorta as previous. 4. Tiny left-sided pleural effusion new from previous. Calcified pleural and  diaphragmatic plaques.  -No bowel distention to suggest obstruction. 5. 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 Similar chronic degenerative change  and lumbar compression deformities.            Labs:    Recent Results (from the past 48 hour(s))   GLUCOSE, POC    Collection Time: 10/02/21 11:57 PM   Result Value Ref Range    Glucose (POC) 110 70 - 110 mg/dL   PHOSPHORUS    Collection Time: 10/03/21  3:18 AM   Result Value Ref Range    Phosphorus 2.0 (L) 2.5 - 4.9 MG/DL MAGNESIUM    Collection Time: 10/03/21  3:18 AM   Result Value Ref Range    Magnesium 1.6 1.6 - 2.6 mg/dL   METABOLIC PANEL, BASIC    Collection Time: 10/03/21  3:18 AM   Result Value Ref Range    Sodium 144 136 - 145 mmol/L    Potassium 3.3 (L) 3.5 - 5.5 mmol/L    Chloride 117 (H) 100 - 111 mmol/L    CO2 18 (L) 21 - 32 mmol/L    Anion gap 9 3.0 - 18 mmol/L    Glucose 110 (H) 74 - 99 mg/dL    BUN 25 (H) 7.0 - 18 MG/DL    Creatinine 1.23 0.6 - 1.3 MG/DL    BUN/Creatinine ratio 20 12 - 20      GFR est AA >60 >60 ml/min/1.73m2    GFR est non-AA 55 (L) >60 ml/min/1.73m2    Calcium 8.1 (L) 8.5 - 10.1 MG/DL   VANCOMYCIN, RANDOM    Collection Time: 10/03/21  3:18 AM   Result Value Ref Range    Vancomycin, random 18.6 5.0 - 40.0 UG/ML   GLUCOSE, POC    Collection Time: 10/03/21  5:13 AM   Result Value Ref Range    Glucose (POC) 107 70 - 110 mg/dL   GLUCOSE, POC    Collection Time: 10/03/21 12:41 PM   Result Value Ref Range    Glucose (POC) 104 70 - 110 mg/dL   GLUCOSE, POC    Collection Time: 10/03/21  6:21 PM   Result Value Ref Range    Glucose (POC) 105 70 - 110 mg/dL   GLUCOSE, POC    Collection Time: 10/03/21 11:47 PM   Result Value Ref Range    Glucose (POC) 126 (H) 70 - 110 mg/dL   PHOSPHORUS    Collection Time: 10/04/21  2:38 AM   Result Value Ref Range    Phosphorus 1.7 (L) 2.5 - 4.9 MG/DL   MAGNESIUM    Collection Time: 10/04/21  2:38 AM   Result Value Ref Range    Magnesium 1.7 1.6 - 2.6 mg/dL   METABOLIC PANEL, BASIC    Collection Time: 10/04/21  2:38 AM   Result Value Ref Range    Sodium 139 136 - 145 mmol/L    Potassium 3.8 3.5 - 5.5 mmol/L    Chloride 112 (H) 100 - 111 mmol/L    CO2 20 (L) 21 - 32 mmol/L    Anion gap 7 3.0 - 18 mmol/L    Glucose 128 (H) 74 - 99 mg/dL    BUN 24 (H) 7.0 - 18 MG/DL    Creatinine 1.11 0.6 - 1.3 MG/DL    BUN/Creatinine ratio 22 (H) 12 - 20      GFR est AA >60 >60 ml/min/1.73m2    GFR est non-AA >60 >60 ml/min/1.73m2    Calcium 7.9 (L) 8.5 - 10.1 MG/DL   VANCOMYCIN, RANDOM Collection Time: 10/04/21  2:38 AM   Result Value Ref Range    Vancomycin, random 14.0 5.0 - 40.0 UG/ML   GLUCOSE, POC    Collection Time: 10/04/21  5:22 AM   Result Value Ref Range    Glucose (POC) 110 70 - 110 mg/dL   GLUCOSE, POC    Collection Time: 10/04/21 12:00 PM   Result Value Ref Range    Glucose (POC) 116 (H) 70 - 110 mg/dL       Signed By: Magnolia Moore MD     October 4, 2021      I spent 25 minutes with the patient in face-to-face consultation, of which greater than 50% was spent in counseling and coordination of care as described above    Disclaimer: Sections of this note are dictated using utilizing voice recognition software. Minor typographical errors may be present. If questions arise, please do not hesitate to contact me or call our department.

## 2021-10-05 ENCOUNTER — HOSPICE ADMISSION (OUTPATIENT)
Dept: HOSPICE | Facility: HOSPICE | Age: 86
End: 2021-10-05
Payer: MEDICARE

## 2021-10-05 LAB
ANION GAP SERPL CALC-SCNC: 10 MMOL/L (ref 3–18)
BACTERIA SPEC CULT: NORMAL
BACTERIA SPEC CULT: NORMAL
BUN SERPL-MCNC: 21 MG/DL (ref 7–18)
BUN/CREAT SERPL: 19 (ref 12–20)
CALCIUM SERPL-MCNC: 7.3 MG/DL (ref 8.5–10.1)
CHLORIDE SERPL-SCNC: 109 MMOL/L (ref 100–111)
CO2 SERPL-SCNC: 18 MMOL/L (ref 21–32)
CREAT SERPL-MCNC: 1.1 MG/DL (ref 0.6–1.3)
GLUCOSE BLD STRIP.AUTO-MCNC: 132 MG/DL (ref 70–110)
GLUCOSE BLD STRIP.AUTO-MCNC: 144 MG/DL (ref 70–110)
GLUCOSE SERPL-MCNC: 156 MG/DL (ref 74–99)
MAGNESIUM SERPL-MCNC: 1.6 MG/DL (ref 1.6–2.6)
PHOSPHATE SERPL-MCNC: 1.9 MG/DL (ref 2.5–4.9)
POTASSIUM SERPL-SCNC: 3.9 MMOL/L (ref 3.5–5.5)
SERVICE CMNT-IMP: NORMAL
SERVICE CMNT-IMP: NORMAL
SODIUM SERPL-SCNC: 137 MMOL/L (ref 136–145)
VANCOMYCIN SERPL-MCNC: 17.3 UG/ML (ref 5–40)

## 2021-10-05 PROCEDURE — 36415 COLL VENOUS BLD VENIPUNCTURE: CPT

## 2021-10-05 PROCEDURE — 99232 SBSQ HOSP IP/OBS MODERATE 35: CPT | Performed by: HOSPITALIST

## 2021-10-05 PROCEDURE — 65660000000 HC RM CCU STEPDOWN

## 2021-10-05 PROCEDURE — 2709999900 HC NON-CHARGEABLE SUPPLY

## 2021-10-05 PROCEDURE — 99233 SBSQ HOSP IP/OBS HIGH 50: CPT | Performed by: NURSE PRACTITIONER

## 2021-10-05 PROCEDURE — 74011250636 HC RX REV CODE- 250/636: Performed by: HOSPITALIST

## 2021-10-05 PROCEDURE — 80048 BASIC METABOLIC PNL TOTAL CA: CPT

## 2021-10-05 PROCEDURE — 83735 ASSAY OF MAGNESIUM: CPT

## 2021-10-05 PROCEDURE — 84100 ASSAY OF PHOSPHORUS: CPT

## 2021-10-05 PROCEDURE — 74011250637 HC RX REV CODE- 250/637: Performed by: HOSPITALIST

## 2021-10-05 PROCEDURE — 82962 GLUCOSE BLOOD TEST: CPT

## 2021-10-05 PROCEDURE — 80202 ASSAY OF VANCOMYCIN: CPT

## 2021-10-05 PROCEDURE — 74011000250 HC RX REV CODE- 250: Performed by: HOSPITALIST

## 2021-10-05 RX ORDER — MAGNESIUM SULFATE HEPTAHYDRATE 40 MG/ML
2 INJECTION, SOLUTION INTRAVENOUS ONCE
Status: COMPLETED | OUTPATIENT
Start: 2021-10-05 | End: 2021-10-06

## 2021-10-05 RX ORDER — METOPROLOL TARTRATE 25 MG/1
12.5 TABLET, FILM COATED ORAL EVERY 12 HOURS
Status: DISCONTINUED | OUTPATIENT
Start: 2021-10-05 | End: 2021-10-06 | Stop reason: HOSPADM

## 2021-10-05 RX ORDER — MORPHINE SULFATE 20 MG/ML
5 SOLUTION ORAL
Status: DISCONTINUED | OUTPATIENT
Start: 2021-10-05 | End: 2021-10-06 | Stop reason: HOSPADM

## 2021-10-05 RX ORDER — SCOLOPAMINE TRANSDERMAL SYSTEM 1 MG/1
1 PATCH, EXTENDED RELEASE TRANSDERMAL
Status: DISCONTINUED | OUTPATIENT
Start: 2021-10-05 | End: 2021-10-06 | Stop reason: HOSPADM

## 2021-10-05 RX ORDER — LORAZEPAM 2 MG/ML
0.5 CONCENTRATE ORAL
Status: DISCONTINUED | OUTPATIENT
Start: 2021-10-05 | End: 2021-10-06 | Stop reason: HOSPADM

## 2021-10-05 RX ORDER — HYOSCYAMINE SULFATE 0.12 MG/1
0.12 TABLET SUBLINGUAL
Status: DISCONTINUED | OUTPATIENT
Start: 2021-10-05 | End: 2021-10-06 | Stop reason: HOSPADM

## 2021-10-05 RX ORDER — GLYCOPYRROLATE 0.2 MG/ML
0.2 INJECTION INTRAMUSCULAR; INTRAVENOUS
Status: DISCONTINUED | OUTPATIENT
Start: 2021-10-05 | End: 2021-10-06 | Stop reason: HOSPADM

## 2021-10-05 RX ADMIN — TIMOLOL MALEATE 1 DROP: 5 SOLUTION OPHTHALMIC at 09:29

## 2021-10-05 RX ADMIN — TIMOLOL MALEATE 1 DROP: 5 SOLUTION OPHTHALMIC at 17:21

## 2021-10-05 RX ADMIN — CEFEPIME HYDROCHLORIDE 2 G: 2 INJECTION, POWDER, FOR SOLUTION INTRAVENOUS at 09:28

## 2021-10-05 RX ADMIN — POTASSIUM PHOSPHATE, MONOBASIC AND POTASSIUM PHOSPHATE, DIBASIC: 224; 236 INJECTION, SOLUTION, CONCENTRATE INTRAVENOUS at 17:21

## 2021-10-05 RX ADMIN — MAGNESIUM SULFATE 2 G: 2 INJECTION INTRAVENOUS at 12:53

## 2021-10-05 RX ADMIN — HEPARIN SODIUM 5000 UNITS: 5000 INJECTION INTRAVENOUS; SUBCUTANEOUS at 14:00

## 2021-10-05 RX ADMIN — VANCOMYCIN HYDROCHLORIDE 750 MG: 750 INJECTION, POWDER, LYOPHILIZED, FOR SOLUTION INTRAVENOUS at 09:28

## 2021-10-05 RX ADMIN — Medication 10 ML: at 13:06

## 2021-10-05 RX ADMIN — HEPARIN SODIUM 5000 UNITS: 5000 INJECTION INTRAVENOUS; SUBCUTANEOUS at 00:22

## 2021-10-05 RX ADMIN — DEXTROSE MONOHYDRATE AND SODIUM CHLORIDE 75 ML/HR: 5; .45 INJECTION, SOLUTION INTRAVENOUS at 07:30

## 2021-10-05 RX ADMIN — Medication 10 ML: at 21:55

## 2021-10-05 RX ADMIN — Medication 30 ML: at 09:28

## 2021-10-05 NOTE — PROGRESS NOTES
[facility]  Progress Note    Patient: Cary Valentino Age: 80 y.o. : 3/14/1927 MR#: [id number] SSN: [id number]  Date/Time: 10/5/2021     Subjective:     Patient seen and evaluated, lying in bed, no acute distress. No new overnight events. Discussed with son Richelle Barthel at bedside about not proceeding ahead with PEG tube placement, highly likely that patient will not do well post procedure. He is very weak, continues to be tachycardic and hypotensive and is wanting to eat. Son is in agreement and wishes to proceed with comfort feeding and take patient home on comfort measures however he needs to discuss it with brother who is patient's POA. Assessment/Plan:     1. Sepsis secondary to possible colitiscompleted IV antibiotics. 2. JULIET on CKD 3 continue IVF, monitor creatinine, improving  3. Tachycardia -added low-dose beta-blocker  4. History of baseline dementia  5. History of dysphagiadiscussed with son Richelle Barthel about not placing PEG tube and proceeding with comfort feeds. He will talk to his brother and let us know. 6. Advanced age with failure to thrive  7.  Severe malnutrition    DVT prophylaxisHeparin  DNR/DNR      Plan will be to discharge patient home with comfort measures                Kusum Gonzales MD  10/05/21      Case discussed with:  []Patient  []Family  []Nursing  []Case Management  DVT Prophylaxis:  []Lovenox  []Hep SQ  []SCDs  []Coumadin   []On Heparin gtt    Objective:   VS:   Visit Vitals  BP 96/62   Pulse (!) 117   Temp 99.9 °F (37.7 °C)   Resp 19   Ht 5' 9\" (1.753 m)   Wt 52.2 kg (115 lb 1.6 oz)   SpO2 100%   BMI 17.00 kg/m²      Tmax/24hrs: Temp (24hrs), Av.2 °F (37.3 °C), Min:97.6 °F (36.4 °C), Max:99.9 °F (37.7 °C)  IOBRIEF    Intake/Output Summary (Last 24 hours) at 10/5/2021 1416  Last data filed at 10/5/2021 0546  Gross per 24 hour   Intake 1627 ml   Output 1000 ml   Net 627 ml       General: Lying in bed, no acute distress, thinly built male  HEENT: PERRLA, anicteric sclerae. Pulmonary:  CTA Bilaterally. No Wheezing/Rhonchi/Rales. Cardiovascular: Regular rate and Rhythm. GI:  Soft, Non distended, Non tender. + Bowel sounds. Extremities:  No edema, cyanosis, clubbing. No calf tenderness. Neurologic: Unable to perform neurological examination  Additional:    Medications:   Current Facility-Administered Medications   Medication Dose Route Frequency    potassium phosphate 20 mmol in 0.9% sodium chloride 250 mL infusion   IntraVENous ONCE    magnesium sulfate 2 g/50 ml IVPB (premix or compounded)  2 g IntraVENous ONCE    metoprolol tartrate (LOPRESSOR) tablet 12.5 mg  12.5 mg Oral P08C    multivit-folic acid-herbal 069 (WELLESSE PLUS) oral liquid 30 mL  30 mL Per NG tube DAILY    sodium chloride (NS) flush 5-10 mL  5-10 mL IntraVENous PRN    sodium chloride (NS) flush 5-40 mL  5-40 mL IntraVENous Q8H    sodium chloride (NS) flush 5-40 mL  5-40 mL IntraVENous PRN    acetaminophen (TYLENOL) tablet 650 mg  650 mg Oral Q6H PRN    Or    acetaminophen (TYLENOL) suppository 650 mg  650 mg Rectal Q6H PRN    polyethylene glycol (MIRALAX) packet 17 g  17 g Oral DAILY PRN    ondansetron (ZOFRAN ODT) tablet 4 mg  4 mg Oral Q8H PRN    Or    ondansetron (ZOFRAN) injection 4 mg  4 mg IntraVENous Q6H PRN    heparin (porcine) injection 5,000 Units  5,000 Units SubCUTAneous Q8H    timolol (TIMOPTIC) 0.5 % ophthalmic solution 1 Drop  1 Drop Both Eyes BID       Imaging:   XR Results (most recent):  Results from Hospital Encounter encounter on 09/29/21    XR CHEST PORT    Narrative  EXAM: PORTABLE CHEST 2303 hours    CLINICAL HISTORY/INDICATION: possible aspiration , sepsis, acute kidney injury,  tachycardia, dementia, dysphagia, nonfunctioning PEG tube    COMPARISON: Chest x-ray 9/29/2021. TECHNIQUE: Single AP view    FINDINGS:    An esophagogastric tube terminates at the distal stomach.  There is hazy opacity  at the left greater than the right lung base with ill definition of the  hemidiaphragm. Bilateral calcified pleural plaques. Normal pulmonary  vascularity. .    Impression  Small bilateral layering pleural effusions. Esophagogastric tube terminates at the distal stomach       CT Results (most recent):  Results from Hospital Encounter encounter on 09/29/21    CT ABD PELV WO CONT    Narrative  CT ABDOMEN AND PELVIS UNENHANCED    CPT CODE: 33332 and 71174    INDICATION: Abdominal pain. Tachycardia noted in the cardiac Cath Lab.    TECHNIQUE: 5 mm collimation axial images obtained from the diaphragm to the  level of the pubic symphysis without intravenous contrast.    All CT scans at this facility are performed using dose optimization technique as  appropriate to this specific exam, to include automated exposure control,  adjustment of the mA and/or KP according to patient size or use of iterative  reconstruction techniques. COMPARISON: Prior CT 7/8/2021. ABDOMEN FINDINGS:  Mild COPD changes at the lung bases. Calcified granuloma right lower lobe. Lenticular atelectasis or scarring dependent left lung base adjacent to a tiny  pleural effusion. Scattered pleural-based calcification/granuloma. Lack of intravenous and oral contrast renders this study suboptimal for  evaluation of solid abdominal organs, vasculature and bowel. Liver not enlarged. No definite mass. Gallbladder is borderline distended, but contains no calcified stones. Pancreas significantly motion degraded. Not enlarged, with no regional  inflammation. Spleen unremarkable. Adrenal glands motion degraded but no mass identified. No hydronephrosis or calcified renal stones.  -Similar motion degraded hyperdense lobulated 1 cm lesion superior medial right  renal cortex.  -Similar 8mm hyperdense lateral right cortical renal lesion image 25.  -Similar subcentimeter cortical hypodensities anterior lateral on the left image  23.     Abdominal aorta is nonaneurysmal. Distal thoracic aorta to the thoracoabdominal  junction is prominent measuring up to 3.1 cm oblique AP diameter, unchanged. PELVIS FINDINGS:  No small bowel distention to suggest obstruction. Stomach is empty. Colon is not  distended. Cannot well assess for wall thickening or other abnormalities given  lack of distention and contrast. Subjective mild increased density in the  pericolonic mesenteric fat along the descending colon level of the pelvic inlet  image 58 for example, though significantly motion degraded. No definite  diverticuli regionally. Cannot exclude some regional inflammation. No free fluid in the pelvis. Prostate not enlarged. Bladder nearly empty and not well assessed. Similar heterogeneous density of the substance of the pelvic bones and sacrum,  with partial fusion across the SI joints. May reflect Paget's disease as appears  chronic. Similar mild superior endplate impression deformity of L2. Impression  1. Subjective mild degree of increased density/stranding pericolonic mid to  distal descending colon to proximal sigmoid levels, though noncontrast exam.  Cannot exclude colitis though colon is collapsed and not well assessed. Clinical  correlation needed. 2. Multiple bilateral hyperdense renal lesions may reflect hemorrhagic or  proteinaceous cyst. No hydronephrosis or stones. 3. Similar segmental enlargement of the distal thoracic aorta as previous. 4. Tiny left-sided pleural effusion new from previous. Calcified pleural and  diaphragmatic plaques.  -No bowel distention to suggest obstruction. 5. 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 Similar chronic degenerative change  and lumbar compression deformities.            Labs:    Recent Results (from the past 48 hour(s))   GLUCOSE, POC    Collection Time: 10/03/21  6:21 PM   Result Value Ref Range    Glucose (POC) 105 70 - 110 mg/dL   GLUCOSE, POC    Collection Time: 10/03/21 11:47 PM   Result Value Ref Range    Glucose (POC) 126 (H) 70 - 110 mg/dL   PHOSPHORUS    Collection Time: 10/04/21  2:38 AM   Result Value Ref Range    Phosphorus 1.7 (L) 2.5 - 4.9 MG/DL   MAGNESIUM    Collection Time: 10/04/21  2:38 AM   Result Value Ref Range    Magnesium 1.7 1.6 - 2.6 mg/dL   METABOLIC PANEL, BASIC    Collection Time: 10/04/21  2:38 AM   Result Value Ref Range    Sodium 139 136 - 145 mmol/L    Potassium 3.8 3.5 - 5.5 mmol/L    Chloride 112 (H) 100 - 111 mmol/L    CO2 20 (L) 21 - 32 mmol/L    Anion gap 7 3.0 - 18 mmol/L    Glucose 128 (H) 74 - 99 mg/dL    BUN 24 (H) 7.0 - 18 MG/DL    Creatinine 1.11 0.6 - 1.3 MG/DL    BUN/Creatinine ratio 22 (H) 12 - 20      GFR est AA >60 >60 ml/min/1.73m2    GFR est non-AA >60 >60 ml/min/1.73m2    Calcium 7.9 (L) 8.5 - 10.1 MG/DL   VANCOMYCIN, RANDOM    Collection Time: 10/04/21  2:38 AM   Result Value Ref Range    Vancomycin, random 14.0 5.0 - 40.0 UG/ML   GLUCOSE, POC    Collection Time: 10/04/21  5:22 AM   Result Value Ref Range    Glucose (POC) 110 70 - 110 mg/dL   GLUCOSE, POC    Collection Time: 10/04/21 12:00 PM   Result Value Ref Range    Glucose (POC) 116 (H) 70 - 110 mg/dL   GLUCOSE, POC    Collection Time: 10/04/21  5:17 PM   Result Value Ref Range    Glucose (POC) 112 (H) 70 - 110 mg/dL   GLUCOSE, POC    Collection Time: 10/05/21  2:18 AM   Result Value Ref Range    Glucose (POC) 144 (H) 70 - 110 mg/dL   PHOSPHORUS    Collection Time: 10/05/21  3:57 AM   Result Value Ref Range    Phosphorus 1.9 (L) 2.5 - 4.9 MG/DL   MAGNESIUM    Collection Time: 10/05/21  3:57 AM   Result Value Ref Range    Magnesium 1.6 1.6 - 2.6 mg/dL   METABOLIC PANEL, BASIC    Collection Time: 10/05/21  3:57 AM   Result Value Ref Range    Sodium 137 136 - 145 mmol/L    Potassium 3.9 3.5 - 5.5 mmol/L    Chloride 109 100 - 111 mmol/L    CO2 18 (L) 21 - 32 mmol/L    Anion gap 10 3.0 - 18 mmol/L    Glucose 156 (H) 74 - 99 mg/dL    BUN 21 (H) 7.0 - 18 MG/DL    Creatinine 1.10 0.6 - 1.3 MG/DL    BUN/Creatinine ratio 19 12 - 20      GFR est AA >60 >60 ml/min/1.73m2    GFR est non-AA >60 >60 ml/min/1.73m2    Calcium 7.3 (L) 8.5 - 10.1 MG/DL   VANCOMYCIN, RANDOM    Collection Time: 10/05/21  3:57 AM   Result Value Ref Range    Vancomycin, random 17.3 5.0 - 40.0 UG/ML   GLUCOSE, POC    Collection Time: 10/05/21 11:45 AM   Result Value Ref Range    Glucose (POC) 132 (H) 70 - 110 mg/dL       Signed By: Chan Jeter MD     October 5, 2021      I spent 25 minutes with the patient in face-to-face consultation, of which greater than 50% was spent in counseling and coordination of care as described above    Disclaimer: Sections of this note are dictated using utilizing voice recognition software. Minor typographical errors may be present. If questions arise, please do not hesitate to contact me or call our department.

## 2021-10-05 NOTE — HOSPICE
Spoke with San Hodgkins, pt's son   Discussed [facility] philosophy, services, criteria, and IDT. Discussed caregiver need for round the clock care with San Hodgkins  primary caregiver identified as Isela Perera, life partner  Caregiver concerns identified as n/a     Answered all questions. San Hodgkins. Advised he and his brother will be breaking down and moving furniture at his dad's house this evening and can accept DME in the morning. Delivery requested between 9-12pm. Pt tentatively discharging home tomorrow. Provided with 24/7 contact information. Hospice referral received. Chart review in process. Thank you for the referral to [facility].  If we can be of further assistance please contact [facility] [address], [address] [address]., [address] [facility], [address] [place] [address] Str.  [phone]  Email: [email or web address]

## 2021-10-05 NOTE — PROGRESS NOTES
Palliative Medicine  DR. CHAUDHRY'[facility]: [phone] ([phone])  [facility]: [address] [place] [address] Way: [phone]    Patient Name: Shivam Hernandez  YOB: 1927    Date of follow up 10/5/2021   Reason for Consult: care decisions  Requesting Provider: Dr Saige Kiser  Primary Care Physician: Arely Martin MD      SUMMARY:   Shivam Hernandez is a 80y.o. year old with a past history of dementia, CKD 3, dysphagia has PEG tube , who was admitted on 9/29/2021 from home  with a diagnosis of colitis. Mr Parag Garcia presented for a planned PEG tube replacement however prior to procedure he became hypotensive and tachycardiac. Found to be septic secondary to possible colitis. Current medical issues leading to Palliative Medicine involvement include: 80year old male who is known to our team from previous admissions. Very thin, chronically ill appearing elderly gentleman. Palliative medicine is consulted for support and care decisions. 10/5/2021 alerts to his name, noted coughing with NGT feeds. Met with attending and sonya Velasquez after discussions with Nora Velasquez and Axel via the phone family's decided to move to comfort remove NGT and no PEG. 10/4/2021 alerts to his name, calm, NGT infusing    10/1/2021   Eyes closed resting comfortably, NGT present. PALLIATIVE DIAGNOSES:   1. Goals of care / care decisions   2. Sepsis   3. Colitis   4. Dementia   5. Debility        PLAN:   1. 10/5/2021 Mr Parag Garcia seen at bedside along with Mr Kristin Dao MSW. He will alert to his name. Noted upper airway congestion, cough. Met with sonya Velasquez along with attending at bedside, RENUKAA and son Gildardo Doe was not able to be at bedside. Son was updated on medical edition by attending. Seems to be aspirating with NGT feeds. Attending shared with son unfortunately patient is not a good candidate for a PEG with ongoing aspiration. A PEG will not stop the aspiration.   Nora Velasquez after discussion with patient's son Dulce Wilson who is MPOA via phone, family has decided to move patient to comfort measures remove the NGT no PEG placement allow oral feeds as he can tolerate. Comfort measures discussed at some length with Eleazar Reina  who also discussed with Dulce Wilson via phone, including no further labs, x-rays allow oral feeds as he is able understanding he will aspirate. Use medications such as Roxanol and Ativan for symptom management. Post form was introduced and signed by Eleazar Reina  as [facility] was not present for DNR/DNI comfort measures no feeding tubes. Hospice support discussed with Seamus Leal he was agreeable to this support. Attending shared with Seamus Leal likely discharge to home tomorrow. Comfort order set in place. Bedside nurse aware care decision changes. Goals of care DNR/DNI comfort measures, no feeding tubes. POST on file with original to family, copy to chart. (Please see below for previous notes per palliative team)     2. 10/1/2021 Patient seen at bedside. Very calm and comfortable appearing. NGT present. No family at bedside. Patient is not able to participate in his medical decisions. Spoke with son yesterday Mr Celine Duff who is also MPOA. He wishes to have PEG replaced to give one last try to have some recovery he is aware enough with PEG tube feeds, his father may not recover to the extent he is hopeful. Goals of care DNR/DNI limited interventions, long term feeding tube. We will continue to follow with you for additional care decisions. 3. Goals of care / care decisions Mr Peewee Cohen seen along with Palliative team. He was alert, [place], confused. Not able to participate in his medical decisions. Noted AMD on file naming his son Celine Duff. Call to Mr Hannah Hunter. Affirmed goals of care DNR/DNI limited interventions, family wishes for long term feeding tube as a last \"ditch effort \" to see if he has any recovery.  We did share, even with GT feeds very thin, likely not absorbing all the feeds and GT feeds are unlikely to change course of his illness and dementia. Goals of care DNR/DNI limited interventions, long term feeding options. POST sent to Mr Mychal Simon for docu signature   4. Sepsis on IVAB likely secondary to colitis   5. Colitis on IVAB; denies abdominal pain   6. Dementia at baseline, verbal recognizes his family. FAST score likely 6C  7. Debility per son mostly chair bed bound, weak PPS 50 indicating mostly chair bound    8. Initial consult note routed to primary continuity provider  9. Communicated plan of care with: Palliative IDT, son       Patient/Health Care Proxy Stated Goals: Comfort      TREATMENT PREFERENCES:   Code Status: DNR/ DNI     Advance Care Planning:  [] [facility] Interdisciplinary Team has updated the ACP Navigator with [address] and Patient Capacity    Primary Decision Maker ([address]):     Medical Interventions: Comfort measuresAMD on file naming his son Mannie Benitez as MPOA   Son John Ocasio as secondary MPOA  Artificially Administered Nutrition: No feeding tube     Other:  As far as possible, the palliative care team has discussed with patient / health care proxy about goals of care / treatment preferences for patient.      HISTORY:     History obtained from: chart and son     CHIEF COMPLAINT: sepsis     HPI/SUBJECTIVE:    The patient is:   [] Verbal and participatory  [x] Non-participatory due to:  Confusion   Please see summary     Clinical Pain Assessment (nonverbal scale for nonverbal patients): Clinical Pain Assessment  Severity: 0          Duration: for how long has pt been experiencing pain (e.g., 2 days, 1 month, years)  Frequency: how often pain is an issue (e.g., several times per day, once every few days, constant)     FUNCTIONAL ASSESSMENT:     Palliative Performance Scale (PPS):  PPS 30     ECOG  ECOG Status : Completely disabled     PSYCHOSOCIAL/SPIRITUAL SCREENING:      Any spiritual / Yarsanism concerns:unable to assess for patient   [] Yes /  [] No    Caregiver Burnout:  [] Yes /  [] No /  [x] No Caregiver Present      Anticipatory grief assessment:  Unable to assess for patient   [] Normal  / [] Maladaptive        REVIEW OF SYSTEMS:     Positive and pertinent negative findings in ROS are noted above in HPI. The following systems were [] reviewed / [x] unable to be reviewed as noted in HPI  Other findings are noted below. Systems: constitutional, ears/nose/mouth/throat, respiratory, gastrointestinal, genitourinary, musculoskeletal, integumentary, neurologic, psychiatric, endocrine. Positive findings noted below. Modified ESAS Completed by: provider   Fatigue: 5 Drowsiness: 0     Pain: 0   Anxiety: 0       Dyspnea: 0           Stool Occurrence(s): 1        PHYSICAL EXAM:     Wt Readings from Last 3 Encounters:   10/04/21 52.2 kg (115 lb 1.6 oz)   09/29/21 48.1 kg (106 lb)   08/31/21 48.1 kg (106 lb)     Blood pressure 96/62, pulse (!) 117, temperature 99.9 °F (37.7 °C), resp. rate 19, height 5' 9\" (1.753 m), weight 52.2 kg (115 lb 1.6 oz), SpO2 100 %.   Pain:  Pain Scale 1: Numeric (0 - 10)  Pain Intensity 1: 0                 Last bowel movement: x 1 9/30/2021     Constitutional: alerts to his name, chronically ill and frail appearing   Cardiovascular tachycardia   Respiratory: upper airway congestion noted, + cough   Gastrointestinal: soft   Skin: warm, dry   Neurologic: alerts to his name, will answer a few very simple questions, did not engage in conversation with me          HISTORY:     Active Problems:    Severe protein-calorie malnutrition ([facility] [address] (8/14/2021)      Colitis (9/29/2021)      Sepsis ([facility] [address] (9/29/2021)      Past Medical History:   Diagnosis Date    Arthritis     Back pain, chronic       Past Surgical History:   Procedure Laterality Date    HX HEENT      relieved pressure R eye 1/2014      Family History   Problem Relation Age of Onset    Diabetes Father     No Known Problems Mother      History reviewed, no pertinent family history.   Social History     Tobacco Use    Smoking status: Never Smoker    Smokeless tobacco: Never Used   Substance Use Topics    Alcohol use: No     Alcohol/week: 0.8 standard drinks     Types: 1 Standard drinks or equivalent per week     No Known Allergies   Current Facility-Administered Medications   Medication Dose Route Frequency    potassium phosphate 20 mmol in 0.9% sodium chloride 250 mL infusion   IntraVENous ONCE    metoprolol tartrate (LOPRESSOR) tablet 12.5 mg  12.5 mg Oral Q12H    LORazepam (INTENSOL) 2 mg/mL oral concentrate 0.5 mg  0.5 mg SubLINGual Q4H PRN    hyoscyamine SL (LEVSIN/SL) tablet 0.125 mg  0.125 mg SubLINGual Q4H PRN    scopolamine (TRANSDERM-SCOP) 1 mg over 3 days 1 Patch  1 Patch TransDERmal Q72H PRN    glycopyrrolate (ROBINUL) injection 0.2 mg  0.2 mg IntraVENous Q4H PRN    morphine (ROXANOL) 100 mg/5 mL (20 mg/mL) concentrated solution 5 mg  5 mg SubLINGual Q2H PRN    sodium chloride (NS) flush 5-10 mL  5-10 mL IntraVENous PRN    sodium chloride (NS) flush 5-40 mL  5-40 mL IntraVENous Q8H    sodium chloride (NS) flush 5-40 mL  5-40 mL IntraVENous PRN    acetaminophen (TYLENOL) tablet 650 mg  650 mg Oral Q6H PRN    Or    acetaminophen (TYLENOL) suppository 650 mg  650 mg Rectal Q6H PRN    polyethylene glycol (MIRALAX) packet 17 g  17 g Oral DAILY PRN    ondansetron (ZOFRAN ODT) tablet 4 mg  4 mg Oral Q8H PRN    Or    ondansetron (ZOFRAN) injection 4 mg  4 mg IntraVENous Q6H PRN    timolol (TIMOPTIC) 0.5 % ophthalmic solution 1 Drop  1 Drop Both Eyes BID        LAB AND IMAGING FINDINGS:     Lab Results   Component Value Date/Time    WBC 5.7 10/02/2021 03:12 AM    HGB 10.1 (L) 10/02/2021 03:12 AM    PLATELET 513 55/66/4103 03:12 AM     Lab Results   Component Value Date/Time    Sodium 137 10/05/2021 03:57 AM    Potassium 3.9 10/05/2021 03:57 AM    Chloride 109 10/05/2021 03:57 AM    CO2 18 (L) 10/05/2021 03:57 AM    BUN 21 (H) 10/05/2021 03:57 AM    Creatinine 1.10 10/05/2021 03:57 AM    Calcium 7.3 (L) 10/05/2021 03:57 AM    Magnesium 1.6 10/05/2021 03:57 AM    Phosphorus 1.9 (L) 10/05/2021 03:57 AM      Lab Results   Component Value Date/Time    Alk. phosphatase 56 10/02/2021 03:12 AM    Protein, total 5.6 (L) 10/02/2021 03:12 AM    Albumin 1.6 (L) 10/02/2021 03:12 AM    Globulin 4.0 10/02/2021 03:12 AM     Lab Results   Component Value Date/Time    INR 1.1 09/29/2021 08:07 AM    Prothrombin time 13.7 09/29/2021 08:07 AM    aPTT 28.1 09/29/2021 08:07 AM      Lab Results   Component Value Date/Time    Iron 25 (L) 04/20/2013 03:15 AM    TIBC 182 (L) 04/20/2013 03:15 AM    Iron % saturation 14 (L) 04/20/2013 03:15 AM      No results found for: PH, PCO2, PO2  No components found for: [place]   Lab Results   Component Value Date/Time     08/13/2021 04:20 PM    CK - MB <1.0 08/13/2021 04:20 PM              Total time: 35 minutes   Counseling / coordination time, spent as noted above:   > 50% counseling / coordination yes     Prolonged service was provided for  []30 min   []75 min in face to face time in the presence of the patient, spent as noted above.   Time Start:   Time End:

## 2021-10-05 NOTE — PROGRESS NOTES
Per my discussion with palliative care patient will be transitioned to hospice and comfort care only. Patient's sonPOA is in agreement with current plan.

## 2021-10-05 NOTE — PROGRESS NOTES
Nutrition Assessment     Type and Reason for Visit: Reassess, Positive nutrition screen, Consult    Nutrition Recommendations/Plan:   - Continue tube feeding of Nepro at 30 mL/hr until formula runs out then change to Jevity 1.5 at 30 mL/hr and advance as tolerated by 15 mL q 12 hours to goal rate of 45 mL/hr with prosource BID and decrease water flushes to 100 mL q 4 hours (goal enteral regimen, Jevity 1.5 at 45 mL/hr + prosource BID to provide 1700 kcal, 91 gm protein, 820 mL free water, 100% RDIs). - Monitor and replace electrolytes as needed due to concern for refeeding syndrome. Continue multivitamin and provide replacement- 20 mmol K Phos, 2 gm Mg.   - Discontinue IVF. Nutrition Assessment:  Patient tolerating tube feeding without c/o abdominal pain or fullness today and formula changed back to Nepro overnight (substituting with Jevity 1.5 due to facility inventory supply), continued at half rate of 23 mL/hr per nursing report as requested by MD with minimal residuals and noted placement of NGT confirmed on imaging 10/3. Malnutrition Assessment:  Malnutrition Status: Severe malnutrition     Estimated Daily Nutrient Needs:  Energy (kcal):  2417-4171  Protein (g):         Fluid (ml/day):  3983-5008    Nutrition Related Findings:  Loose stool 10/4. Hypernatremia resolved, sodium trending down and electrolyte abnormalities noted- Phos and Mg low. Nutrition plan discussed with nursing and MD- obtained order for replacement and to stop fluids from MD. Pertinent Medications: D5 1/2NS at 75 mL/hr (started 9/30), multivitamin, miralax prn. Chest X-ray findings (10/3/21): Small bilateral layering pleural effusions.  Esophagogastric tube terminates at the distal stomach      Additional Caloric Sources: D5 1/2NS at 75 mL/hr to provide 90 gm dextrose, 306 kcal per day     Current Nutrition Therapies:  DIET NPO  ADULT TUBE FEEDING Nasogastric; Standard with Fiber; Delivery Method: Continuous; Continuous Initial Rate (mL/hr): 30; Continuous Advance Tube Feeding: Yes; Advancement Volume (mL/hr): 15; Advancement Frequency: Q 12 hours; Continuous Goal Rate (m. ..     Anthropometric Measures:  · Height:  5' 9\" (175.3 cm)  · Current Body Wt:  52.2 kg (115 lb 1.3 oz)  · BMI: 17    Nutrition Diagnosis:   · Severe malnutrition, In context of chronic illness related to catabolic illness, inadequate protein-energy intake, cognitive or neurological impairment as evidenced by poor intake prior to admission, weight loss greater than or equal to 20% in 1 year    Nutrition Intervention:  Food and/or Nutrient Delivery: Continue NPO, Start tube feeding, IV fluid delivery, Vitamin supplement  Nutrition Education and Counseling: No recommendations at this time  Coordination of Nutrition Care: Continue to monitor while inpatient, Coordination of community care    Goals:  Nutritional needs will be met through adequate oral intake or nutrition support within the next 7 days       Nutrition Monitoring and Evaluation:   Behavioral-Environmental Outcomes: None identified  Food/Nutrient Intake Outcomes: Enteral nutrition intake/tolerance, Vitamin/mineral intake  Physical Signs/Symptoms Outcomes: Biochemical data, GI status, Fluid status or edema, Nutrition focused physical findings, Weight    Discharge Planning:    Enteral nutrition     Electronically signed by Omari Dinh RD, [address]  on 10/5/2021 at 9:28 AM    Contact Number: [phone]

## 2021-10-05 NOTE — ROUTINE PROCESS
9383 = Dietician called to discuss pt nutrition. Ordered to restart Jevity once Nepro bottle completes; Adjust tube feedings to a rate of 30ml/h. Advancement orders to be placed with a goal of 45ml/h. Ordered to discontinue continuous fluids. Pt is not in any abdominal pain. Occasional productive cough with suction.

## 2021-10-06 ENCOUNTER — HOME CARE VISIT (OUTPATIENT)
Dept: HOSPICE | Facility: HOSPICE | Age: 86
End: 2021-10-06
Payer: MEDICARE

## 2021-10-06 VITALS
OXYGEN SATURATION: 100 % | HEIGHT: 69 IN | HEART RATE: 106 BPM | SYSTOLIC BLOOD PRESSURE: 113 MMHG | DIASTOLIC BLOOD PRESSURE: 69 MMHG | BODY MASS INDEX: 17.05 KG/M2 | WEIGHT: 115.1 LBS | RESPIRATION RATE: 18 BRPM | TEMPERATURE: 98 F

## 2021-10-06 VITALS
DIASTOLIC BLOOD PRESSURE: 58 MMHG | RESPIRATION RATE: 22 BRPM | HEART RATE: 110 BPM | TEMPERATURE: 98.9 F | SYSTOLIC BLOOD PRESSURE: 108 MMHG | OXYGEN SATURATION: 98 %

## 2021-10-06 PROCEDURE — 99239 HOSP IP/OBS DSCHRG MGMT >30: CPT | Performed by: HOSPITALIST

## 2021-10-06 PROCEDURE — G0299 HHS/HOSPICE OF RN EA 15 MIN: HCPCS

## 2021-10-06 PROCEDURE — 2709999900 HC NON-CHARGEABLE SUPPLY

## 2021-10-06 PROCEDURE — 3336500001 HSPC ELECTION

## 2021-10-06 PROCEDURE — 0651 HSPC ROUTINE HOME CARE

## 2021-10-06 PROCEDURE — 99231 SBSQ HOSP IP/OBS SF/LOW 25: CPT | Performed by: NURSE PRACTITIONER

## 2021-10-06 RX ORDER — MORPHINE SULFATE 20 MG/ML
5 SOLUTION ORAL
Qty: 15 ML | Refills: 0 | Status: SHIPPED | OUTPATIENT
Start: 2021-10-06 | End: 2021-10-11

## 2021-10-06 RX ORDER — SCOLOPAMINE TRANSDERMAL SYSTEM 1 MG/1
1 PATCH, EXTENDED RELEASE TRANSDERMAL
Qty: 4 PATCH | Refills: 0 | Status: SHIPPED | OUTPATIENT
Start: 2021-10-06

## 2021-10-06 RX ORDER — LORAZEPAM 2 MG/ML
0.5 CONCENTRATE ORAL
Qty: 15 ML | Refills: 0 | Status: SHIPPED | OUTPATIENT
Start: 2021-10-06

## 2021-10-06 RX ORDER — HYOSCYAMINE SULFATE 0.12 MG/1
0.12 TABLET SUBLINGUAL
Qty: 15 TABLET | Refills: 0 | Status: SHIPPED | OUTPATIENT
Start: 2021-10-06

## 2021-10-06 RX ADMIN — TIMOLOL MALEATE 1 DROP: 5 SOLUTION OPHTHALMIC at 10:41

## 2021-10-06 NOTE — DISCHARGE SUMMARY
Hospitalist Discharge Summary    Patient: Sukhi Mcpherson MRN: [id number]  [facility]: [id number]    YOB: 1927  Age: 80 y.o. Sex: male    DOA: 9/29/2021 LOS:  LOS: 7 days   Discharge Date:     Admission Diagnoses: Colitis [K52.9]  Sepsis ([address] [A41.9]    Discharge Diagnoses:    1. Sepsis  2. Colitis  3. Failure to thrive  4. JULIET on CKD 3  5. Baseline dementia  6. History of dysphagia  7. Severe malnutrition    Discharge Condition: Poor    Discharge To: Home    PHYSICAL EXAM  Visit Vitals  /69 (BP 1 Location: Right upper arm, BP Patient Position: At rest)   Pulse (!) 106   Temp 98 °F (36.7 °C)   Resp 18   Ht 5' 9\" (1.753 m)   Wt 52.2 kg (115 lb 1.6 oz)   SpO2 100%   BMI 17.00 kg/m²       General: Alert, cooperative, no acute distress    HEENT: PERRLA, EOMI. Anicteric sclerae. Lungs:  CTA Bilaterally. No Wheezing/Rhonchi/Rales. Heart:  Regular rate and Rhythm. Abdomen: Soft, Non distended, Non tender. + Bowel sounds. Extremities: No edema/ cyanosis/ clubbing  Neurologic:  AA oriented X 3. Moves all extremities. HPI:  Sukhi Mcpherson is a 80 y.o. male who has a history of dysphagia, baseline dementia, CKD 3 presents from interventional radiology where he was to get his PEG tube replaced however prior to performing the procedure patient became hypotensive and tachycardic, patient was sent to the emergency room for further evaluation.     Patient is unable to give any information and most of the information was obtained from the ER notes and previous admissions. I tried to contact patient's son but was unable to do so.     ER evaluationpatient noted to be septic with tachycardia, leukocytosis and JULIET on CKD 3. Patient underwent CT abdomen and pelvis which showed possible colitis.  Patient started on broad-spectrum IV antibiotics and is being admitted to the hospital for further evaluation.     Palliative care consulted      Hospital Course:   80year-old male with a history of dysphagia baseline dementia CKD 3 presents from interventional radiology where he was to get a PEG tube placed. Prior to the procedure patient became hypotensive tachycardic and presented to the emergency room. Patient was noted to be septic secondary to colitis. Started on broad-spectrum IV antibiotics and admitted to the hospital.  I had a lengthy conversation with the patient's sons who are actively involved in his care. Patient is a DNR and is currently being cared for by his sons at home. Patient's oral intake was very poor and NG tube was placed and tube feeding started. Given patient's current condition I did not think it was appropriate to have a PEG tube placed and I had a lengthy discussion with the sons who also agreed. Hospice was consulted and patient is currently being discharged with home hospice. Follow up Care:   None    Consults: None    Significant Diagnostic Studies:     Imaging:  XR Results (most recent):  Results from Hospital Encounter encounter on 09/29/21    XR CHEST PORT    Narrative  EXAM: PORTABLE CHEST 2303 hours    CLINICAL HISTORY/INDICATION: possible aspiration , sepsis, acute kidney injury,  tachycardia, dementia, dysphagia, nonfunctioning PEG tube    COMPARISON: Chest x-ray 9/29/2021. TECHNIQUE: Single AP view    FINDINGS:    An esophagogastric tube terminates at the distal stomach. There is hazy opacity  at the left greater than the right lung base with ill definition of the  hemidiaphragm. Bilateral calcified pleural plaques. Normal pulmonary  vascularity. .    Impression  Small bilateral layering pleural effusions. Esophagogastric tube terminates at the distal stomach       CT Results (most recent):  Results from Hospital Encounter encounter on 09/29/21    CT ABD PELV WO CONT    Narrative  CT ABDOMEN AND PELVIS UNENHANCED    CPT CODE: 85460 and 36943    INDICATION: Abdominal pain.  Tachycardia noted in the cardiac Cath Lab.    TECHNIQUE: 5 mm collimation axial images obtained from the diaphragm to the  level of the pubic symphysis without intravenous contrast.    All CT scans at this facility are performed using dose optimization technique as  appropriate to this specific exam, to include automated exposure control,  adjustment of the mA and/or KP according to patient size or use of iterative  reconstruction techniques. COMPARISON: Prior CT 7/8/2021. ABDOMEN FINDINGS:  Mild COPD changes at the lung bases. Calcified granuloma right lower lobe. Lenticular atelectasis or scarring dependent left lung base adjacent to a tiny  pleural effusion. Scattered pleural-based calcification/granuloma. Lack of intravenous and oral contrast renders this study suboptimal for  evaluation of solid abdominal organs, vasculature and bowel. Liver not enlarged. No definite mass. Gallbladder is borderline distended, but contains no calcified stones. Pancreas significantly motion degraded. Not enlarged, with no regional  inflammation. Spleen unremarkable. Adrenal glands motion degraded but no mass identified. No hydronephrosis or calcified renal stones.  -Similar motion degraded hyperdense lobulated 1 cm lesion superior medial right  renal cortex.  -Similar 8mm hyperdense lateral right cortical renal lesion image 25.  -Similar subcentimeter cortical hypodensities anterior lateral on the left image  23. Abdominal aorta is nonaneurysmal. Distal thoracic aorta to the thoracoabdominal  junction is prominent measuring up to 3.1 cm oblique AP diameter, unchanged. PELVIS FINDINGS:  No small bowel distention to suggest obstruction. Stomach is empty. Colon is not  distended. Cannot well assess for wall thickening or other abnormalities given  lack of distention and contrast. Subjective mild increased density in the  pericolonic mesenteric fat along the descending colon level of the pelvic inlet  image 58 for example, though significantly motion degraded.  No definite  diverticuli regionally. Cannot exclude some regional inflammation. No free fluid in the pelvis. Prostate not enlarged. Bladder nearly empty and not well assessed. Similar heterogeneous density of the substance of the pelvic bones and sacrum,  with partial fusion across the SI joints. May reflect Paget's disease as appears  chronic. Similar mild superior endplate impression deformity of L2. Impression  1. Subjective mild degree of increased density/stranding pericolonic mid to  distal descending colon to proximal sigmoid levels, though noncontrast exam.  Cannot exclude colitis though colon is collapsed and not well assessed. Clinical  correlation needed. 2. Multiple bilateral hyperdense renal lesions may reflect hemorrhagic or  proteinaceous cyst. No hydronephrosis or stones. 3. Similar segmental enlargement of the distal thoracic aorta as previous. 4. Tiny left-sided pleural effusion new from previous. Calcified pleural and  diaphragmatic plaques.  -No bowel distention to suggest obstruction. 5. 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 Similar chronic degenerative change  and lumbar compression deformities. Procedures:   None    Discharge Medications:     Current Discharge Medication List      START taking these medications    Details   hyoscyamine SL (LEVSIN/SL) 0.125 mg SL tablet 1 Tablet by SubLINGual route every four (4) hours as needed for Other (Secretions). Qty: 15 Tablet, Refills: 0      LORazepam (INTENSOL) 2 mg/mL concentrated solution 0.25 mL by SubLINGual route every four (4) hours as needed for Agitation, Anxiety or Shortness of Breath. Max Daily Amount: 3 mg. Qty: 15 mL, Refills: 0    Associated Diagnoses: Goals of care, counseling/discussion      morphine (ROXANOL) 100 mg/5 mL (20 mg/mL) concentrated solution 0.25 mL by SubLINGual route every two (2) hours as needed for Pain or Shortness of Breath for up to 5 days.  Max Daily Amount: 60 mg.  Qty: 15 mL, Refills: 0    Associated Diagnoses: Goals of care, counseling/discussion      scopolamine (TRANSDERM-SCOP) 1 mg over 3 days pt3d 1 Patch by TransDERmal route every seventy-two (72) hours as needed for Other (SECRETIONS).   Qty: 4 Patch, Refills: 0         STOP taking these medications       metroNIDAZOLE (FLAGYL) 500 mg tablet Comments:   Reason for Stopping:         loperamide (IMODIUM) 2 mg capsule Comments:   Reason for Stopping:         acetaminophen (TYLENOL) 500 mg tablet Comments:   Reason for Stopping:         megestroL (MEGACE) 400 mg/10 mL (10 mL) suspension Comments:   Reason for Stopping:         gabapentin (NEURONTIN) 300 mg capsule Comments:   Reason for Stopping:         polyethylene glycol (MIRALAX) 17 gram packet Comments:   Reason for Stopping:         therapeutic multivitamin (THERAGRAN) tablet Comments:   Reason for Stopping:         bisacodyl (DULCOLAX) 5 mg EC tablet Comments:   Reason for Stopping:         timolol (TIMOPTIC) 0.5 % ophthalmic solution Comments:   Reason for Stopping:               Current Facility-Administered Medications:     metoprolol tartrate (LOPRESSOR) tablet 12.5 mg, 12.5 mg, Oral, Q12H, Brandy Fernández MD    LORazepam (INTENSOL) 2 mg/mL oral concentrate 0.5 mg, 0.5 mg, SubLINGual, Q4H PRN, Oneida Suarez NP    hyoscyamine SL (LEVSIN/SL) tablet 0.125 mg, 0.125 mg, SubLINGual, Q4H PRN, Oneida Suarez NP    scopolamine (TRANSDERM-SCOP) 1 mg over 3 days 1 Patch, 1 Patch, TransDERmal, Q72H PRN, Oneida Suarez NP    glycopyrrolate (ROBINUL) injection 0.2 mg, 0.2 mg, IntraVENous, Q4H PRN, Oneida Suarez NP    morphine (ROXANOL) 100 mg/5 mL (20 mg/mL) concentrated solution 5 mg, 5 mg, SubLINGual, Q2H PRN, Oneida Suarez NP    sodium chloride (NS) flush 5-10 mL, 5-10 mL, IntraVENous, PRN, Brandy Fernández MD    sodium chloride (NS) flush 5-40 mL, 5-40 mL, IntraVENous, Q8H, Brandy Fernández MD, 10 mL at 10/05/21 2155    sodium chloride (NS) flush 5-40 mL, 5-40 mL, IntraVENous, PRN, Sanju Bourgeois MD    acetaminophen (TYLENOL) tablet 650 mg, 650 mg, Oral, Q6H PRN **OR** acetaminophen (TYLENOL) suppository 650 mg, 650 mg, Rectal, Q6H PRN, Sanju Bourgeois MD    polyethylene glycol (MIRALAX) packet 17 g, 17 g, Oral, DAILY PRN, Sanju Bourgeois MD    ondansetron (ZOFRAN ODT) tablet 4 mg, 4 mg, Oral, Q8H PRN **OR** ondansetron (ZOFRAN) injection 4 mg, 4 mg, IntraVENous, Q6H PRN, Sanju Bourgeois MD    timolol (TIMOPTIC) 0.5 % ophthalmic solution 1 Drop, 1 Drop, Both Eyes, BID, Sanju Bourgeois MD, 1 Drop at 10/06/21 1041     Activity: Activity as tolerated    Diet: Comfort feeding    Wound Care: None needed      Juan C Flores MD  10/6/2021, 11:32 AM    Total time spent 35 mins  Disclaimer: Sections of this note are dictated using utilizing voice recognition software. Minor typographical errors may be present. If questions arise, please do not hesitate to contact me or call our department.

## 2021-10-06 NOTE — PROGRESS NOTES
Discharge order noted with plan for pt to transition home with hospice. Spoke with Chelo Tse, hospice liaison about pt's plan for discharge today and confirmed that a nurse will be available to admit pt to hospice care today. Transportation arranged with Edie Lowry at [facility] ([phone]) for 3:30pm today. [facility] bedside nurse, Ezekiel Oliveros; and son, Lex Speaker of the transportation time. Son confirmed that he has received the DME.        Milind Whitehead, MSN, RN, ACM-RN     [phone]- pager  [phone]- main office

## 2021-10-06 NOTE — PROGRESS NOTES
Important Message from [facility]" reviewed and explained with the patient's representative son, Lonnie Gates via phone. A signed copy provided to patient/representative. Original signed document placed in patient's chart.

## 2021-10-06 NOTE — CONSULTS
Palliative Medicine  DR. [facility]AUD[facility]: [phone] ([phone])  [facility]: [address] [place] [address] Way: [phone]    Patient Name: Delvin Duff  YOB: 1927    Date of follow up 10/6/2021   Reason for Consult: care decisions  Requesting Provider: Dr Fawad Andrews  Primary Care Physician: Pam Camacho MD      SUMMARY:   Delvin Duff is a 80y.o. year old with a past history of dementia, CKD 3, dysphagia has PEG tube , who was admitted on 9/29/2021 from home  with a diagnosis of colitis. Mr Renard Nicole presented for a planned PEG tube replacement however prior to procedure he became hypotensive and tachycardiac. Found to be septic secondary to possible colitis. Current medical issues leading to Palliative Medicine involvement include: 80year old male who is known to our team from previous admissions. Very thin, chronically ill appearing elderly gentleman. Palliative medicine is consulted for support and care decisions. 10/6/2021: Appears to be asleep, opens eyes briefly when his name is called. No acute distress. Patient on comfort measures    10/5/2021 alerts to his name, noted coughing with NGT feeds. Met with attending and son Stas Washington after discussions with Stas Washington and Axel via the:  phone family's decided to move to comfort remove NGT and no PEG. 10/4/2021 alerts to his name, calm, NGT infusing    10/1/2021   Eyes closed resting comfortably, NGT present. PALLIATIVE DIAGNOSES:   1. Goals of care / care decisions   2. Sepsis   3. Colitis   4. Dementia   5. Debility        PLAN:   1. 10/6/2021: Met with patient at patient's bedside. Resting comfortably, appears to be asleep, opens eyes briefly to his name, not engaged in conversation. Patient remains on comfort measures only, with plan to D/C home with hospice today. POST Form on file with the following measures: DNR/DNI, comfort measures, no feeding tubes.   Continue oral feeds for comfort as patient tolerates. (Please see below for previous notes per palliative team)     2. 10/5/2021 Mr Haile Hand seen at bedside along with Mr Rad Staff MSW. He will alert to his name. Noted upper airway congestion, cough. Met with son Stephanie Rios along with attending at bedside, MPOA and son Sena Roberts was not able to be at bedside. Son was updated on medical edition by attending. Seems to be aspirating with NGT feeds. Attending shared with son unfortunately patient is not a good candidate for a PEG with ongoing aspiration. A PEG will not stop the aspiration. Stephanie Rios after discussion with patient's son Tayo Marks Junior who is MPOA via phone, family has decided to move patient to comfort measures remove the NGT no PEG placement allow oral feeds as he can tolerate. Comfort measures discussed at some length with Stephanie Rios  who also discussed with Sena Roberts via phone, including no further labs, x-rays allow oral feeds as he is able understanding he will aspirate. Use medications such as Roxanol and Ativan for symptom management. Post form was introduced and signed by Stephanie Rios  as [facility] was not present for DNR/DNI comfort measures no feeding tubes. Hospice support discussed with Severo Melendrez he was agreeable to this support. Attending shared with Severo Melendrez likely discharge to home tomorrow. Comfort order set in place. Bedside nurse aware care decision changes. Goals of care DNR/DNI comfort measures, no feeding tubes. POST on file with original to family, copy to chart. 3. 10/1/2021 Patient seen at bedside. Very calm and comfortable appearing. NGT present. No family at bedside. Patient is not able to participate in his medical decisions. Spoke with son yesterday Mr Andry Call who is also MPOA. He wishes to have PEG replaced to give one last try to have some recovery he is aware enough with PEG tube feeds, his father may not recover to the extent he is hopeful.  Goals of care DNR/DNI limited interventions, long term feeding tube. We will continue to follow with you for additional care decisions. 4. Goals of care / care decisions Mr Austyn Chowdhury seen along with Palliative team. He was alert, [place], confused. Not able to participate in his medical decisions. Noted AMD on file naming his son Ree Taylor. Call to Mr Kristy Chan. Affirmed goals of care DNR/DNI limited interventions, family wishes for long term feeding tube as a last \"ditch effort \" to see if he has any recovery. We did share, even with GT feeds very thin, likely not absorbing all the feeds and GT feeds are unlikely to change course of his illness and dementia. Goals of care DNR/DNI limited interventions, long term feeding options. POST sent to Mr Kristy Chan for docu signature   5. Sepsis on IVAB likely secondary to colitis   6. Colitis on IVAB; denies abdominal pain   7. Dementia at baseline, verbal recognizes his family. FAST score likely 6C  8. Debility per son mostly chair bed bound, weak PPS 50 indicating mostly chair bound    9. Initial consult note routed to primary continuity provider  10. Communicated plan of care with: Palliative IDT, son       Patient/Health Care Proxy Stated Goals: Comfort      TREATMENT PREFERENCES:   Code Status: DNR/ DNI     Advance Care Planning:  [] The [facility] Interdisciplinary Team has updated the ACP Navigator with [address] and Patient Capacity    Primary Decision Maker ([address]):     Medical Interventions: Comfort measuresAMD on file naming his son Ree Taylor as MPOA   Son Giovanna Javier as secondary MPOA  Artificially Administered Nutrition: No feeding tube     Other:  As far as possible, the palliative care team has discussed with patient / health care proxy about goals of care / treatment preferences for patient.      HISTORY:     History obtained from: chart and son     CHIEF COMPLAINT: sepsis     HPI/SUBJECTIVE:    The patient is:   [] Verbal and participatory  [x] Non-participatory due to: Confusion   Please see summary     Clinical Pain Assessment (nonverbal scale for nonverbal patients): Clinical Pain Assessment  Severity: 0     Activity (Movement): Lying quietly, normal position    Duration: for how long has pt been experiencing pain (e.g., 2 days, 1 month, years)  Frequency: how often pain is an issue (e.g., several times per day, once every few days, constant)     FUNCTIONAL ASSESSMENT:     Palliative Performance Scale (PPS):  PPS 30     ECOG  ECOG Status : Completely disabled     PSYCHOSOCIAL/SPIRITUAL SCREENING:      Any spiritual / Confucianist concerns:unable to assess for patient   [] Yes /  [] No    Caregiver Burnout:  [] Yes /  [] No /  [x] No Caregiver Present      Anticipatory grief assessment:  Unable to assess for patient   [] Normal  / [] Maladaptive        REVIEW OF SYSTEMS:     Positive and pertinent negative findings in ROS are noted above in HPI. The following systems were [] reviewed / [x] unable to be reviewed as noted in HPI  Other findings are noted below. Systems: constitutional, ears/nose/mouth/throat, respiratory, gastrointestinal, genitourinary, musculoskeletal, integumentary, neurologic, psychiatric, endocrine. Positive findings noted below. Modified ESAS Completed by: provider   Fatigue: 5 Drowsiness: 0     Pain: 0   Anxiety: 0       Dyspnea: 0           Stool Occurrence(s): 1        PHYSICAL EXAM:     Wt Readings from Last 3 Encounters:   10/04/21 52.2 kg (115 lb 1.6 oz)   09/29/21 48.1 kg (106 lb)   08/31/21 48.1 kg (106 lb)     Blood pressure 113/69, pulse (!) 106, temperature 98 °F (36.7 °C), resp. rate 18, height 5' 9\" (1.753 m), weight 52.2 kg (115 lb 1.6 oz), SpO2 100 %.   Pain:  Pain Scale 1: Numeric (0 - 10)  Pain Intensity 1: 0     Pain Location 1: Back           Last bowel movement: x 1 9/30/2021     Constitutional: asleep, chronically ill and frail appearing, NAD, cachectic  Cardiovascular tachycardia   Respiratory: Unlabored respirations, symmetric  Gastrointestinal: soft   Skin: warm, dry   Neurologic: Asleep, but alerts to name by opening eyes, did not engage in conversation with me          HISTORY:     Active Problems:    Severe protein-calorie malnutrition ([facility] [address] (8/14/2021)      Colitis (9/29/2021)      [facility] (9/29/2021)      Past Medical History:   Diagnosis Date    Arthritis     Back pain, chronic       Past Surgical History:   Procedure Laterality Date    HX HEENT      relieved pressure R eye 1/2014      Family History   Problem Relation Age of Onset    Diabetes Father     No Known Problems Mother      History reviewed, no pertinent family history.   Social History     Tobacco Use    Smoking status: Never Smoker    Smokeless tobacco: Never Used   Substance Use Topics    Alcohol use: No     Alcohol/week: 0.8 standard drinks     Types: 1 Standard drinks or equivalent per week     No Known Allergies   Current Facility-Administered Medications   Medication Dose Route Frequency    metoprolol tartrate (LOPRESSOR) tablet 12.5 mg  12.5 mg Oral Q12H    LORazepam (INTENSOL) 2 mg/mL oral concentrate 0.5 mg  0.5 mg SubLINGual Q4H PRN    hyoscyamine SL (LEVSIN/SL) tablet 0.125 mg  0.125 mg SubLINGual Q4H PRN    scopolamine (TRANSDERM-SCOP) 1 mg over 3 days 1 Patch  1 Patch TransDERmal Q72H PRN    glycopyrrolate (ROBINUL) injection 0.2 mg  0.2 mg IntraVENous Q4H PRN    morphine (ROXANOL) 100 mg/5 mL (20 mg/mL) concentrated solution 5 mg  5 mg SubLINGual Q2H PRN    sodium chloride (NS) flush 5-10 mL  5-10 mL IntraVENous PRN    sodium chloride (NS) flush 5-40 mL  5-40 mL IntraVENous Q8H    sodium chloride (NS) flush 5-40 mL  5-40 mL IntraVENous PRN    acetaminophen (TYLENOL) tablet 650 mg  650 mg Oral Q6H PRN    Or    acetaminophen (TYLENOL) suppository 650 mg  650 mg Rectal Q6H PRN    polyethylene glycol (MIRALAX) packet 17 g  17 g Oral DAILY PRN    ondansetron (ZOFRAN ODT) tablet 4 mg  4 mg Oral Q8H PRN    Or    ondansetron [facility]) injection 4 mg  4 mg IntraVENous Q6H PRN    timolol (TIMOPTIC) 0.5 % ophthalmic solution 1 Drop  1 Drop Both Eyes BID        LAB AND IMAGING FINDINGS:     Lab Results   Component Value Date/Time    WBC 5.7 10/02/2021 03:12 AM    HGB 10.1 (L) 10/02/2021 03:12 AM    PLATELET 945 35/30/4443 03:12 AM     Lab Results   Component Value Date/Time    Sodium 137 10/05/2021 03:57 AM    Potassium 3.9 10/05/2021 03:57 AM    Chloride 109 10/05/2021 03:57 AM    CO2 18 (L) 10/05/2021 03:57 AM    BUN 21 (H) 10/05/2021 03:57 AM    Creatinine 1.10 10/05/2021 03:57 AM    Calcium 7.3 (L) 10/05/2021 03:57 AM    Magnesium 1.6 10/05/2021 03:57 AM    Phosphorus 1.9 (L) 10/05/2021 03:57 AM      Lab Results   Component Value Date/Time    Alk. phosphatase 56 10/02/2021 03:12 AM    Protein, total 5.6 (L) 10/02/2021 03:12 AM    Albumin 1.6 (L) 10/02/2021 03:12 AM    Globulin 4.0 10/02/2021 03:12 AM     Lab Results   Component Value Date/Time    INR 1.1 09/29/2021 08:07 AM    Prothrombin time 13.7 09/29/2021 08:07 AM    aPTT 28.1 09/29/2021 08:07 AM      Lab Results   Component Value Date/Time    Iron 25 (L) 04/20/2013 03:15 AM    TIBC 182 (L) 04/20/2013 03:15 AM    Iron % saturation 14 (L) 04/20/2013 03:15 AM      No results found for: PH, PCO2, PO2  No components found for: [place]   Lab Results   Component Value Date/Time     08/13/2021 04:20 PM    CK - MB <1.0 08/13/2021 04:20 PM              Total time: 15 minutes   Counseling / coordination time, spent as noted above:   > 50% counseling / coordination yes     Prolonged service was provided for  []30 min   []75 min in face to face time in the presence of the patient, spent as noted above.   Time Start:   Time End:

## 2021-10-06 NOTE — ROUTINE PROCESS
Bedside and Verbal shift change report given to Blake Matute RN (oncoming nurse) by Sadiq Alvarez RN  (offgoing nurse). Report included the following information SBAR, Intake/Output, MAR, Recent Results and Cardiac Rhythm ST/SR.

## 2021-10-07 ENCOUNTER — HOME CARE VISIT (OUTPATIENT)
Dept: HOSPICE | Facility: HOSPICE | Age: 86
End: 2021-10-07
Payer: MEDICARE

## 2021-10-07 VITALS — TEMPERATURE: 98.2 F | OXYGEN SATURATION: 90 % | HEART RATE: 90 BPM | RESPIRATION RATE: 16 BRPM

## 2021-10-07 PROCEDURE — 0651 HSPC ROUTINE HOME CARE

## 2021-10-07 PROCEDURE — G0155 HHCP-SVS OF CSW,EA 15 MIN: HCPCS

## 2021-10-07 PROCEDURE — G0299 HHS/HOSPICE OF RN EA 15 MIN: HCPCS

## 2021-10-07 NOTE — HOSPICE
Hospice Admission Summary  Mr. Sherry Alford, 80year old male, admitted to Hospice services for a terminal diagnosis of senile degeneration. Patient has elected hospice services and is no longer seeking aggressive treatment. Co-morbidities related to the terminal diagnosis are CKD, debility. Patient also has a past medical history of anemia, chronic back pain, pneumonia, failure to thrive, AMS. The family/caregiver is present and willing and safely able to provide care and administer medications, their availability is daily. The patient/family/caregiver/facility participated in goal setting, care planning, and are agreeable to the care plan. Admission booklet reviewed with patient/family/caregiver/facility; services provided under hospice benefit, review of rights and responsibilities, disposal of medications, contact information for , Joint Commission, Medicare, O, and outside resources for independence. The patient/family/caregiver/facility educated on IDT and their right to attend meetings. Education provided regarding 24-hour availability of hospice services and on-call number provided. Yes  Attending physician: Dr Demario Castillo Director:  Dr Domenic Peterson  Level of Care:  total  Advance Directives:  on file, family has copy  Allergies:  NKDA  MAC: left arm - 20cm  Height:  5'9\"  Weight:  115  PPS:  20%  FAST:  7A  NYHA:  N/A   EF%:  N/A   Tobacco usage:  N/A  Functional status: dependent for ADLs  Infection:  N/A   Pain:  Patient denies pain on admission assessment. Comfort medication scripts for Morphine, Lorazepam, Scopolamine patches and Levsin sent home with patient. [facility]. Patient has already been profiled. Bowel Regimen:  Bisacodyl 10mg supp daily prn for constipation and Tylenol 650mg supp - 1 every 4 hours as needed for fever ordered through [facility].   Lines, Drains, or Airways present: none  Wounds present: none  Symptoms to monitor to maintain comfort: none  Hospice Aide Services Requested:  yes  MSW Requested: open to   Requested: open to  [employer] Requested: no  Bereavement risk/contact: low on admission assessment  Patient/family/caregiver specific end of life goal:   Training and education provided this visit: Hospice philosophy, treatment plan, contacting agency, medication review, consents signed  Plan for next visit: Review medications with CG once they are in the home. Care coordination with Medical Director, IDG, and CGs verbalized understanding of the current hospice plan and are in agreement with it at this time.

## 2021-10-07 NOTE — HOSPICE
SN - initial case managment visit.  745Dora Clark Nassar arrived during visit. Eusebio Arteagadock - friend of family is present. Patient is supine in bed at time of visit with eyes closed. Opens eyes briefly to call of name. Does not speak. Education provided as patient was turned and cleaned with diaper changed. Preventative foam dressing removed and skin intact. Barrier cream applied. Offered catheter but [facility] declines at this time. No signs of pain or shortness of breath noted. Patient is not using oxygen at this time. No smoke detector- advised [facility] to call fire department to come and replace. She verbalizes understanding. Education provided on hospice services, hospice philosophy, toileting and turning to prevent skin breakdown. Rachell to  meds later today from pharmacy. Advised to call hospice if any questions concerning administration. No other needs at this time.

## 2021-10-08 ENCOUNTER — HOME CARE VISIT (OUTPATIENT)
Dept: HOSPICE | Facility: HOSPICE | Age: 86
End: 2021-10-08
Payer: MEDICARE

## 2021-10-08 PROCEDURE — 0651 HSPC ROUTINE HOME CARE

## 2021-10-09 PROCEDURE — 0651 HSPC ROUTINE HOME CARE

## 2021-10-10 PROCEDURE — 0651 HSPC ROUTINE HOME CARE

## 2021-10-11 ENCOUNTER — HOME CARE VISIT (OUTPATIENT)
Dept: HOSPICE | Facility: HOSPICE | Age: 86
End: 2021-10-11
Payer: MEDICARE

## 2021-10-11 PROCEDURE — 0651 HSPC ROUTINE HOME CARE
